# Patient Record
Sex: FEMALE | Race: WHITE | Employment: OTHER | URBAN - METROPOLITAN AREA
[De-identification: names, ages, dates, MRNs, and addresses within clinical notes are randomized per-mention and may not be internally consistent; named-entity substitution may affect disease eponyms.]

---

## 2021-03-17 ENCOUNTER — OFFICE VISIT (OUTPATIENT)
Dept: CARDIOLOGY CLINIC | Age: 66
End: 2021-03-17
Payer: MEDICARE

## 2021-03-17 VITALS
SYSTOLIC BLOOD PRESSURE: 124 MMHG | BODY MASS INDEX: 47.09 KG/M2 | DIASTOLIC BLOOD PRESSURE: 82 MMHG | WEIGHT: 293 LBS | HEIGHT: 66 IN | RESPIRATION RATE: 22 BRPM | OXYGEN SATURATION: 99 % | HEART RATE: 67 BPM

## 2021-03-17 DIAGNOSIS — I50.9 CONGESTIVE HEART FAILURE, UNSPECIFIED HF CHRONICITY, UNSPECIFIED HEART FAILURE TYPE (HCC): ICD-10-CM

## 2021-03-17 DIAGNOSIS — Z76.89 ENCOUNTER TO ESTABLISH CARE: ICD-10-CM

## 2021-03-17 DIAGNOSIS — R06.02 SHORTNESS OF BREATH: Primary | ICD-10-CM

## 2021-03-17 PROCEDURE — 93000 ELECTROCARDIOGRAM COMPLETE: CPT | Performed by: INTERNAL MEDICINE

## 2021-03-17 PROCEDURE — 99204 OFFICE O/P NEW MOD 45 MIN: CPT | Performed by: INTERNAL MEDICINE

## 2021-03-17 RX ORDER — PREDNISONE 10 MG/1
10 TABLET ORAL DAILY
Qty: 30 TABLET | Refills: 0 | Status: SHIPPED | OUTPATIENT
Start: 2021-03-17 | End: 2021-03-22

## 2021-03-17 ASSESSMENT — ENCOUNTER SYMPTOMS
SHORTNESS OF BREATH: 0
VOMITING: 0
NAUSEA: 0
APNEA: 0
BLOOD IN STOOL: 0
CHEST TIGHTNESS: 0
DIARRHEA: 0

## 2021-03-17 NOTE — PROGRESS NOTES
[address] [facility] OFFICE FOLLOW-UP      Patient: Mary Carlton  YOB: 1955  MRN: [id number]    Chief Complaint:  Chief Complaint   Patient presents with   Guadalupe Parker Cardiologist     Transfer for Dr. Zuri Preciado Congestive Heart Failure         Subjective/HPI:  3/17/21: Patient presents today for evaluation of shortness of breath. She sees Dr. Keyon Toledos. Reportedly was started on Medrol. Shortness of breath has not improved much. She is on a tapering dose. Currently on 20mg and then down to 10mg in a few days. Still morbidly obese. Accompanied by her daughter. She does not have a family physician. She is on a higher oxygen demand. Currently on 5 L. Has history of hypertension and also diabetes. Is on Metformin. Has dyslipidemia. She is accompanied by her daughter. Also has a history of GI issues. Will be set up with Dr. Becky Roth. Dr. Alyssa Justice and Dr. Zula Prader in the pulmonary service. She wants to consolidate all her specialties and primary care in [facility].  We will get an echocardiogram to evaluate electrical systolic pressure and right ventricular function and then see me. Prescription for 10 mg of prednisone to continue for another month will be made. Also has lower extremity edema which I think is due to venous insufficiency but will make therapeutic decision after echo is available             No past medical history on file. No past surgical history on file. No family history on file. Social History     Socioeconomic History    Marital status:       Spouse name: None    Number of children: None    Years of education: None    Highest education level: None   Occupational History    None   Social Needs    Financial resource strain: None    Food insecurity     Worry: None     Inability: None    Transportation needs     Medical: None     Non-medical: None   Tobacco Use    Smoking status: Former Smoker    Smokeless tobacco: Never Used   Substance and Sexual Activity    Alcohol use: Not Currently     Comment: occass    Drug use: Never    Sexual activity: None   Lifestyle    Physical activity     Days per week: None     Minutes per session: None    Stress: None   Relationships    Social connections     Talks on phone: None     Gets together: None     Attends Hoahaoism service: None     Active member of club or organization: None     Attends meetings of clubs or organizations: None     Relationship status: None    Intimate partner violence     Fear of current or ex partner: None     Emotionally abused: None     Physically abused: None     Forced sexual activity: None   Other Topics Concern    None   Social History Narrative    None       Allergies   Allergen Reactions    Budesonide-Formoterol Fumarate      Other reaction(s): THRUSH  Other reaction(s): Other: See Comments  thrush       Current Outpatient Medications   Medication Sig Dispense Refill    predniSONE (DELTASONE) 10 MG tablet Take 1 tablet by mouth daily for 5 days 30 tablet 0     No current facility-administered medications for this visit. Review of Systems:   Review of Systems   Constitutional: Negative for diaphoresis and fatigue. HENT: Negative for nosebleeds. Respiratory: Negative for apnea, cough, chest tightness, shortness of breath, wheezing and stridor. Cardiovascular: Negative for chest pain, palpitations and leg swelling. Gastrointestinal: Negative for blood in stool, diarrhea, nausea and vomiting. Musculoskeletal: Negative for myalgias, neck pain and neck stiffness. Neurological: Negative for dizziness, seizures, syncope, weakness, light-headedness, numbness and headaches. Hematological: Does not bruise/bleed easily. Psychiatric/Behavioral: Negative for confusion and suicidal ideas. The patient is not nervous/anxious. All other systems reviewed and are negative. Review of System is negative except for as mentioned above.       Physical Examination:    BP Provider:   Garrett Carpio MD     Requested Specialty:   Pulmonology     Number of Visits Requested:   1    EKG 12 Lead     Order Specific Question:   Reason for Exam?     Answer:   Shortness of Breath    Echo 2D w doppler w color complete     Standing Status:   Future     Standing Expiration Date:   3/17/2022     Scheduling Instructions: To be scheduled in [facility] Next week     Order Specific Question:   Reason for exam:     Answer:   chf         Orders Placed This Encounter   Medications    predniSONE (DELTASONE) 10 MG tablet     Sig: Take 1 tablet by mouth daily for 5 days     Dispense:  30 tablet     Refill:  0             Assessment/Orders:       ICD-10-CM    1. Shortness of breath  R06.02 EKG 12 Lead   2. Congestive heart failure, unspecified HF chronicity, unspecified heart failure type ([facility] [address]  I50.9 Echo 2D w doppler w color complete   3. Encounter to establish care  Z76.89 [facility] CANDY LARKIN MD, Gastroenterology, [facility]     Ambulatory referral to [address] [facility] Brian Weaver MD, Pulmonology, [place]       Orders Placed This Encounter   Medications    predniSONE (DELTASONE) 10 MG tablet     Sig: Take 1 tablet by mouth daily for 5 days     Dispense:  30 tablet     Refill:  0       There are no discontinued medications.     Orders Placed This Encounter   Procedures   [facility] CANDY LARKIN MD, Gastroenterology, [facility]     Referral Priority:   Routine     Referral Type:   Eval and Treat     Referral Reason:   Specialty Services Required     Referred to Provider:   Estefani Ferrera MD     Requested Specialty:   Gastroenterology     Number of Visits Requested:   1    Ambulatory referral to Internal Medicine     Referral Priority:   Routine     Referral Type:   Consult for Advice and Opinion     Referral Reason:   Specialty Services Required     Requested Specialty:   Internal Medicine     Number of Visits Requested:   Sivan Dong MD, Pulmonology, [facility]

## 2021-03-18 ASSESSMENT — ENCOUNTER SYMPTOMS
COUGH: 0
STRIDOR: 0
WHEEZING: 0

## 2021-03-19 ENCOUNTER — APPOINTMENT (OUTPATIENT)
Dept: CT IMAGING | Age: 66
DRG: 552 | End: 2021-03-19
Payer: MEDICARE

## 2021-03-19 ENCOUNTER — APPOINTMENT (OUTPATIENT)
Dept: ULTRASOUND IMAGING | Age: 66
DRG: 552 | End: 2021-03-19
Payer: MEDICARE

## 2021-03-19 ENCOUNTER — APPOINTMENT (OUTPATIENT)
Dept: GENERAL RADIOLOGY | Age: 66
DRG: 552 | End: 2021-03-19
Payer: MEDICARE

## 2021-03-19 ENCOUNTER — APPOINTMENT (OUTPATIENT)
Dept: MRI IMAGING | Age: 66
DRG: 552 | End: 2021-03-19
Payer: MEDICARE

## 2021-03-19 ENCOUNTER — HOSPITAL ENCOUNTER (INPATIENT)
Age: 66
LOS: 2 days | Discharge: HOME | DRG: 552 | End: 2021-03-21
Attending: EMERGENCY MEDICINE | Admitting: INTERNAL MEDICINE
Payer: MEDICARE

## 2021-03-19 DIAGNOSIS — R29.898 LEFT LEG WEAKNESS: ICD-10-CM

## 2021-03-19 DIAGNOSIS — M79.10 MYALGIA: ICD-10-CM

## 2021-03-19 DIAGNOSIS — I63.9 CEREBROVASCULAR ACCIDENT (CVA), UNSPECIFIED MECHANISM (HCC): Primary | ICD-10-CM

## 2021-03-19 DIAGNOSIS — M54.16 LUMBAR RADICULOPATHY: ICD-10-CM

## 2021-03-19 DIAGNOSIS — M54.42 ACUTE BACK PAIN WITH SCIATICA, LEFT: ICD-10-CM

## 2021-03-19 LAB
ALBUMIN SERPL-MCNC: 3.8 G/DL (ref 3.5–4.6)
ALP BLD-CCNC: 95 U/L (ref 40–130)
ALT SERPL-CCNC: 51 U/L (ref 0–33)
ANION GAP SERPL CALCULATED.3IONS-SCNC: 11 MEQ/L (ref 9–15)
APTT: 24.4 SEC (ref 24.4–36.8)
AST SERPL-CCNC: 51 U/L (ref 0–35)
BASOPHILS ABSOLUTE: 0.2 K/UL (ref 0–0.2)
BASOPHILS RELATIVE PERCENT: 1.1 %
BILIRUB SERPL-MCNC: 1 MG/DL (ref 0.2–0.7)
BUN BLDV-MCNC: 18 MG/DL (ref 8–23)
CALCIUM SERPL-MCNC: 9.7 MG/DL (ref 8.5–9.9)
CHLORIDE BLD-SCNC: 98 MEQ/L (ref 95–107)
CO2: 32 MEQ/L (ref 20–31)
CREAT SERPL-MCNC: 1.44 MG/DL (ref 0.5–0.9)
EKG ATRIAL RATE: 75 BPM
EKG P AXIS: 63 DEGREES
EKG P-R INTERVAL: 158 MS
EKG Q-T INTERVAL: 382 MS
EKG QRS DURATION: 82 MS
EKG QTC CALCULATION (BAZETT): 426 MS
EKG R AXIS: 32 DEGREES
EKG T AXIS: 61 DEGREES
EKG VENTRICULAR RATE: 75 BPM
EOSINOPHILS ABSOLUTE: 0.4 K/UL (ref 0–0.7)
EOSINOPHILS RELATIVE PERCENT: 2.7 %
GFR AFRICAN AMERICAN: 43.8
GFR NON-AFRICAN AMERICAN: 36.2
GLOBULIN: 3 G/DL (ref 2.3–3.5)
GLUCOSE BLD-MCNC: 157 MG/DL (ref 60–115)
GLUCOSE BLD-MCNC: 183 MG/DL (ref 70–99)
GLUCOSE BLD-MCNC: 246 MG/DL (ref 60–115)
HCT VFR BLD CALC: 41.5 % (ref 37–47)
HEMOGLOBIN: 14 G/DL (ref 12–16)
INR BLD: 0.9
LYMPHOCYTES ABSOLUTE: 2.5 K/UL (ref 1–4.8)
LYMPHOCYTES RELATIVE PERCENT: 17.8 %
MAGNESIUM: 1.8 MG/DL (ref 1.7–2.4)
MCH RBC QN AUTO: 30.7 PG (ref 27–31.3)
MCHC RBC AUTO-ENTMCNC: 33.7 % (ref 33–37)
MCV RBC AUTO: 91.3 FL (ref 82–100)
MONOCYTES ABSOLUTE: 0.6 K/UL (ref 0.2–0.8)
MONOCYTES RELATIVE PERCENT: 4.3 %
NEUTROPHILS ABSOLUTE: 10.4 K/UL (ref 1.4–6.5)
NEUTROPHILS RELATIVE PERCENT: 74.1 %
PDW BLD-RTO: 13.6 % (ref 11.5–14.5)
PERFORMED ON: ABNORMAL
PERFORMED ON: ABNORMAL
PLATELET # BLD: 412 K/UL (ref 130–400)
POTASSIUM SERPL-SCNC: 4 MEQ/L (ref 3.4–4.9)
PROTHROMBIN TIME: 12.7 SEC (ref 12.3–14.9)
RBC # BLD: 4.55 M/UL (ref 4.2–5.4)
SARS-COV-2, NAAT: NOT DETECTED
SODIUM BLD-SCNC: 141 MEQ/L (ref 135–144)
TOTAL PROTEIN: 6.8 G/DL (ref 6.3–8)
TROPONIN: <0.01 NG/ML (ref 0–0.01)
WBC # BLD: 14 K/UL (ref 4.8–10.8)

## 2021-03-19 PROCEDURE — 1210000000 HC MED SURG R&B

## 2021-03-19 PROCEDURE — 92610 EVALUATE SWALLOWING FUNCTION: CPT

## 2021-03-19 PROCEDURE — 6370000000 HC RX 637 (ALT 250 FOR IP): Performed by: EMERGENCY MEDICINE

## 2021-03-19 PROCEDURE — 85025 COMPLETE CBC W/AUTO DIFF WBC: CPT

## 2021-03-19 PROCEDURE — 6370000000 HC RX 637 (ALT 250 FOR IP): Performed by: INTERNAL MEDICINE

## 2021-03-19 PROCEDURE — 70553 MRI BRAIN STEM W/O & W/DYE: CPT

## 2021-03-19 PROCEDURE — 2580000003 HC RX 258: Performed by: INTERNAL MEDICINE

## 2021-03-19 PROCEDURE — 85730 THROMBOPLASTIN TIME PARTIAL: CPT

## 2021-03-19 PROCEDURE — 71045 X-RAY EXAM CHEST 1 VIEW: CPT

## 2021-03-19 PROCEDURE — 93010 ELECTROCARDIOGRAM REPORT: CPT | Performed by: INTERNAL MEDICINE

## 2021-03-19 PROCEDURE — 99284 EMERGENCY DEPT VISIT MOD MDM: CPT

## 2021-03-19 PROCEDURE — A9579 GAD-BASE MR CONTRAST NOS,1ML: HCPCS | Performed by: INTERNAL MEDICINE

## 2021-03-19 PROCEDURE — 6360000002 HC RX W HCPCS: Performed by: NURSE PRACTITIONER

## 2021-03-19 PROCEDURE — 84484 ASSAY OF TROPONIN QUANT: CPT

## 2021-03-19 PROCEDURE — 36415 COLL VENOUS BLD VENIPUNCTURE: CPT

## 2021-03-19 PROCEDURE — 93005 ELECTROCARDIOGRAM TRACING: CPT | Performed by: EMERGENCY MEDICINE

## 2021-03-19 PROCEDURE — 87635 SARS-COV-2 COVID-19 AMP PRB: CPT

## 2021-03-19 PROCEDURE — 97165 OT EVAL LOW COMPLEX 30 MIN: CPT

## 2021-03-19 PROCEDURE — 70450 CT HEAD/BRAIN W/O DYE: CPT

## 2021-03-19 PROCEDURE — 6360000004 HC RX CONTRAST MEDICATION: Performed by: INTERNAL MEDICINE

## 2021-03-19 PROCEDURE — 83735 ASSAY OF MAGNESIUM: CPT

## 2021-03-19 PROCEDURE — 2580000003 HC RX 258: Performed by: EMERGENCY MEDICINE

## 2021-03-19 PROCEDURE — 93970 EXTREMITY STUDY: CPT

## 2021-03-19 PROCEDURE — 6360000002 HC RX W HCPCS: Performed by: INTERNAL MEDICINE

## 2021-03-19 PROCEDURE — 94664 DEMO&/EVAL PT USE INHALER: CPT

## 2021-03-19 PROCEDURE — 80053 COMPREHEN METABOLIC PANEL: CPT

## 2021-03-19 PROCEDURE — 94761 N-INVAS EAR/PLS OXIMETRY MLT: CPT

## 2021-03-19 PROCEDURE — 85610 PROTHROMBIN TIME: CPT

## 2021-03-19 PROCEDURE — 94640 AIRWAY INHALATION TREATMENT: CPT

## 2021-03-19 RX ORDER — SODIUM CHLORIDE 0.9 % (FLUSH) 0.9 %
10 SYRINGE (ML) INJECTION 2 TIMES DAILY
Status: DISCONTINUED | OUTPATIENT
Start: 2021-03-19 | End: 2021-03-21 | Stop reason: HOSPADM

## 2021-03-19 RX ORDER — ASPIRIN 300 MG/1
300 SUPPOSITORY RECTAL DAILY
Status: DISCONTINUED | OUTPATIENT
Start: 2021-03-19 | End: 2021-03-21 | Stop reason: HOSPADM

## 2021-03-19 RX ORDER — ALBUTEROL SULFATE 2.5 MG/3ML
2.5 SOLUTION RESPIRATORY (INHALATION)
Status: DISCONTINUED | OUTPATIENT
Start: 2021-03-19 | End: 2021-03-21 | Stop reason: HOSPADM

## 2021-03-19 RX ORDER — MONTELUKAST SODIUM 10 MG/1
10 TABLET ORAL NIGHTLY
COMMUNITY
End: 2022-03-17 | Stop reason: SDUPTHER

## 2021-03-19 RX ORDER — ALBUTEROL SULFATE 90 UG/1
2 AEROSOL, METERED RESPIRATORY (INHALATION) EVERY 6 HOURS PRN
COMMUNITY

## 2021-03-19 RX ORDER — ALBUTEROL SULFATE 2.5 MG/3ML
2.5 SOLUTION RESPIRATORY (INHALATION) EVERY 6 HOURS PRN
COMMUNITY

## 2021-03-19 RX ORDER — ONDANSETRON 2 MG/ML
4 INJECTION INTRAMUSCULAR; INTRAVENOUS EVERY 6 HOURS PRN
Status: DISCONTINUED | OUTPATIENT
Start: 2021-03-19 | End: 2021-03-21 | Stop reason: HOSPADM

## 2021-03-19 RX ORDER — ASPIRIN 81 MG/1
81 TABLET ORAL DAILY
Status: DISCONTINUED | OUTPATIENT
Start: 2021-03-19 | End: 2021-03-21 | Stop reason: HOSPADM

## 2021-03-19 RX ORDER — PRAVASTATIN SODIUM 20 MG
20 TABLET ORAL NIGHTLY
COMMUNITY
End: 2021-10-21 | Stop reason: SDUPTHER

## 2021-03-19 RX ORDER — 0.9 % SODIUM CHLORIDE 0.9 %
1000 INTRAVENOUS SOLUTION INTRAVENOUS ONCE
Status: COMPLETED | OUTPATIENT
Start: 2021-03-19 | End: 2021-03-19

## 2021-03-19 RX ORDER — ALBUTEROL SULFATE 2.5 MG/3ML
2.5 SOLUTION RESPIRATORY (INHALATION) EVERY 6 HOURS PRN
Status: DISCONTINUED | OUTPATIENT
Start: 2021-03-19 | End: 2021-03-19

## 2021-03-19 RX ORDER — POLYETHYLENE GLYCOL 3350 17 G/17G
17 POWDER, FOR SOLUTION ORAL DAILY PRN
Status: DISCONTINUED | OUTPATIENT
Start: 2021-03-19 | End: 2021-03-21 | Stop reason: HOSPADM

## 2021-03-19 RX ORDER — BUDESONIDE AND FORMOTEROL FUMARATE DIHYDRATE 160; 4.5 UG/1; UG/1
2 AEROSOL RESPIRATORY (INHALATION) 2 TIMES DAILY
COMMUNITY
End: 2021-04-27

## 2021-03-19 RX ORDER — DEXTROSE MONOHYDRATE 50 MG/ML
100 INJECTION, SOLUTION INTRAVENOUS PRN
Status: DISCONTINUED | OUTPATIENT
Start: 2021-03-19 | End: 2021-03-21 | Stop reason: HOSPADM

## 2021-03-19 RX ORDER — DULOXETIN HYDROCHLORIDE 60 MG/1
60 CAPSULE, DELAYED RELEASE ORAL DAILY
COMMUNITY
End: 2021-05-28 | Stop reason: ALTCHOICE

## 2021-03-19 RX ORDER — SODIUM CHLORIDE 0.9 % (FLUSH) 0.9 %
10 SYRINGE (ML) INJECTION EVERY 12 HOURS SCHEDULED
Status: DISCONTINUED | OUTPATIENT
Start: 2021-03-19 | End: 2021-03-21 | Stop reason: HOSPADM

## 2021-03-19 RX ORDER — CLOPIDOGREL BISULFATE 75 MG/1
150 TABLET ORAL ONCE
Status: COMPLETED | OUTPATIENT
Start: 2021-03-19 | End: 2021-03-19

## 2021-03-19 RX ORDER — ACLIDINIUM BROMIDE 400 UG/1
1 POWDER, METERED RESPIRATORY (INHALATION) 2 TIMES DAILY
COMMUNITY
End: 2021-04-27 | Stop reason: SDUPTHER

## 2021-03-19 RX ORDER — NICOTINE POLACRILEX 4 MG
15 LOZENGE BUCCAL PRN
Status: DISCONTINUED | OUTPATIENT
Start: 2021-03-19 | End: 2021-03-21 | Stop reason: HOSPADM

## 2021-03-19 RX ORDER — POTASSIUM CHLORIDE 1.5 G/1.77G
20 POWDER, FOR SOLUTION ORAL 2 TIMES DAILY
COMMUNITY
End: 2021-05-28 | Stop reason: ALTCHOICE

## 2021-03-19 RX ORDER — DEXTROSE MONOHYDRATE 25 G/50ML
12.5 INJECTION, SOLUTION INTRAVENOUS PRN
Status: DISCONTINUED | OUTPATIENT
Start: 2021-03-19 | End: 2021-03-21 | Stop reason: HOSPADM

## 2021-03-19 RX ORDER — ASPIRIN 81 MG/1
81 TABLET ORAL DAILY
COMMUNITY
End: 2022-06-09

## 2021-03-19 RX ORDER — ATORVASTATIN CALCIUM 80 MG/1
80 TABLET, FILM COATED ORAL NIGHTLY
Status: DISCONTINUED | OUTPATIENT
Start: 2021-03-19 | End: 2021-03-21 | Stop reason: HOSPADM

## 2021-03-19 RX ORDER — ALBUTEROL SULFATE 2.5 MG/3ML
2.5 SOLUTION RESPIRATORY (INHALATION) 2 TIMES DAILY
Status: DISCONTINUED | OUTPATIENT
Start: 2021-03-20 | End: 2021-03-21 | Stop reason: HOSPADM

## 2021-03-19 RX ORDER — TORSEMIDE 20 MG/1
20 TABLET ORAL DAILY
COMMUNITY
End: 2022-03-17 | Stop reason: SDUPTHER

## 2021-03-19 RX ORDER — PROMETHAZINE HYDROCHLORIDE 12.5 MG/1
12.5 TABLET ORAL EVERY 6 HOURS PRN
Status: DISCONTINUED | OUTPATIENT
Start: 2021-03-19 | End: 2021-03-21 | Stop reason: HOSPADM

## 2021-03-19 RX ORDER — SODIUM CHLORIDE 0.9 % (FLUSH) 0.9 %
10 SYRINGE (ML) INJECTION PRN
Status: DISCONTINUED | OUTPATIENT
Start: 2021-03-19 | End: 2021-03-21 | Stop reason: HOSPADM

## 2021-03-19 RX ORDER — LOSARTAN POTASSIUM 25 MG/1
25 TABLET ORAL EVERY EVENING
COMMUNITY
End: 2021-03-30 | Stop reason: ALTCHOICE

## 2021-03-19 RX ADMIN — ATORVASTATIN CALCIUM 80 MG: 80 TABLET, FILM COATED ORAL at 21:57

## 2021-03-19 RX ADMIN — ENOXAPARIN SODIUM 40 MG: 40 INJECTION SUBCUTANEOUS at 15:08

## 2021-03-19 RX ADMIN — SODIUM CHLORIDE 1000 ML: 9 INJECTION, SOLUTION INTRAVENOUS at 11:38

## 2021-03-19 RX ADMIN — ALBUTEROL SULFATE 2.5 MG: 2.5 SOLUTION RESPIRATORY (INHALATION) at 22:18

## 2021-03-19 RX ADMIN — GADOTERIDOL 20 ML: 279.3 INJECTION, SOLUTION INTRAVENOUS at 14:19

## 2021-03-19 RX ADMIN — CLOPIDOGREL BISULFATE 150 MG: 75 TABLET ORAL at 15:08

## 2021-03-19 RX ADMIN — SODIUM CHLORIDE, PRESERVATIVE FREE 10 ML: 5 INJECTION INTRAVENOUS at 15:09

## 2021-03-19 ASSESSMENT — PAIN SCALES - GENERAL
PAINLEVEL_OUTOF10: 8
PAINLEVEL_OUTOF10: 0

## 2021-03-19 ASSESSMENT — ENCOUNTER SYMPTOMS
BACK PAIN: 0
NAUSEA: 0
SHORTNESS OF BREATH: 0
ABDOMINAL PAIN: 0
DIARRHEA: 0
COUGH: 0
SORE THROAT: 0
VOMITING: 0

## 2021-03-19 ASSESSMENT — PAIN DESCRIPTION - DESCRIPTORS: DESCRIPTORS: NUMBNESS

## 2021-03-19 NOTE — FLOWSHEET NOTE
1522: pt arrived from ED via cart. Pt states that last night she started having weakness and tingling in her left leg. Then this morning her left arm started to tingle and became numb. No other symptoms reported and pt's symptoms have resolved. NIH is currently 1. Ptis A&OX4. 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 Push/pulls/ are moderate-strong. No tongue deviation or facial droop. No drift noted. No headache. No aphasia. Pt passed swallow eval. Pt wears a c-pap at night. Perfect serve sent to the NP for orders.

## 2021-03-19 NOTE — PROGRESS NOTES
[facility]   Facility/Department: [facility]  Speech Language Pathology  Clinical Bedside Swallow Evaluation    Hellen Hodges  : 1955 (75 y.o.)   MRN: [id number]  [facility]  ADMISSION DATE: 3/19/2021  PATIENT DIAGNOSIS(ES): Left leg weakness [R29.898]  Chief Complaint   Patient presents with    Numbness     numbness to left leg and left arm starting 3 hours ago. Patient Active Problem List    Diagnosis Date Noted    Left leg weakness 2021     Past Medical History:   Diagnosis Date    Asthma     COPD (chronic obstructive pulmonary disease) ([facility] [address]     Depression     Diabetes mellitus ([facility] [address]      Past Surgical History:   Procedure Laterality Date    CHOLECYSTECTOMY       Allergies   Allergen Reactions    Budesonide-Formoterol Fumarate      Other reaction(s): THRUSH  Other reaction(s): Other: See Comments  thrush       DATE ONSET: 3/19/21    Date of Evaluation: 3/19/2021   Evaluating Therapist: Sunil Maria SLP    Recommended Diet and Intervention  Diet Solids Recommendation: Regular  Liquid Consistency Recommendation: Thin  Recommended Form of Meds: Whole with water          Compensatory Swallowing Strategies       Reason for Referral  Marcy Magana was referred for a bedside swallow evaluation to assess the efficiency of her swallow function, identify signs and symptoms of aspiration and make recommendations regarding safe dietary consistencies, effective compensatory strategies, and safe eating environment. General  Chart Reviewed: Yes  Behavior/Cognition: Alert; Cooperative  Temperature Spikes Noted: No  Respiratory Status: Room air  O2 Device: None (Room air)  Communication Observation: Functional  Follows Directions: Simple  Dentition: Some missing teeth  Patient Positioning: Upright in bed  Baseline Vocal Quality: Normal  Consistencies Administered: Reg solid; Dysphagia Soft and Bite-Sized (Dysphagia III); Dysphagia Pureed (Dysphagia I); Thin    Current Diet level:  Current Diet : Regular  Current Liquid Diet : Thin    Oral Motor Deficits  Oral/Motor  Oral Motor: Within functional limits    Oral Phase Dysfunction  Oral Phase  Oral Phase: WNL  Oral Phase  Oral Phase - Comment: Pt p/w oral phase WNL for all consistencies tested     Indicators of Pharyngeal Phase Dysfunction   Pharyngeal Phase  Pharyngeal Phase: WNL  Pharyngeal Phase   Pharyngeal: Pt p/w pharyngeal phase WNL for all consistencies judged vial palpation with no overt s/s of aspiration    Impression  Dysphagia Diagnosis: Swallow function appears grossly intact  Dysphagia Outcome Severity Scale: Level 7: Normal in all situations     Treatment Plan     Duration/Frequency of Treatment: N/A  D/C Recommendations: To be determined       Treatment/Goals  Short-term Goals  Goal 1: N/A  Long-term Goals  Goal 1: N/A    Prognosis       Education  Patient Education: pt educated on results of BSE  Patient Education Response: Verbalizes understanding          Pain Assessment:  Initial Assessment:  Patient denies pain. Re-assessment:  Patient denies pain.     [x]  Perfect Serve sent to ordering physician  [x]  Evaluation routed to ordering physician inbox         Therapy Time   Time in: 4569  Time out: [facility]  Minutes: 8              Signature: Electronically signed by BRITANY Johns on 3/19/2021 at 3:56 PM

## 2021-03-19 NOTE — CARE COORDINATION
[facility] EMERGENCY [facility] AT [facility] Case Management Initial Discharge Assessment    Met with DAUGHTER Tequila Morales to discuss discharge plan. PCP:  BECCA WATERS  JUST GOT SET UP                      Date of Last Visit: N/A    If no PCP, list provided? N/A    Discharge Planning    Living Arrangements: independently at home    Who do you live with? SELF    Who helps you with your care:  self or Kirsty [id number]    If lives at home:     Do you have any barriers navigating in your home? yes - STAIRS TO ENTRANCE AND TO BATHROOM BUT OTHERWISE IT IS ONE FLOOR    Patient can perform ADL? Yes    Current Services (outpatient and in home) :  None    Dialysis: No    Is transportation available to get to your appointments? Yes    DME Equipment:  NO    Respiratory equipment: Continuous Oxygen  5 Liters , CPAP WITH 5 LITER O2    Respiratory provider:  yes - [address]:  yes - [address] with Medication Assistance Program?  No      Patient agreeable to [facility] ? Yes, Company NEEDS LIST    Patient agreeable to SNF/Rehab? YES    Other discharge needs identified? Other TBD    [place] of choice list provided with basic dialogue that supports the patient's individualized plan of care/goals and shares the quality data associated with the providers. Yes    Does Patient Have a High-Risk for Readmission Diagnosis (CHF, PN, MI, COPD)? Yes, see care coordinator assessment COPD      The plan for Transition of Care is related to the following treatment goals:     Initial Discharge Plan? (Note: please see concurrent daily documentation for any updates after initial note). [id number]    The Patient and/or patient representative: WILL BE provided with choice of any post-acute providers for care and equipment and agrees with discharge plan  Yes    Electronically signed by Catherine Barton RN on 3/19/2021 at 1:07 PM

## 2021-03-19 NOTE — ED PROVIDER NOTES
[facility] ED  eMERGENCYdEPARTMENT eNCOUnter      Pt Name: Harjeet Roy  MRN: [id number]  Katiegfmarcela 1955  Date of evaluation: 3/19/2021  Christa Case MD    CHIEF COMPLAINT           HPI  Harjeet Roy is a 74 y.o. female per chart review has a h/o DM II, HTN, Hpl, COPD/asthma presents to the ED with weakness and numbness. Pt notes gradual onset, moderate, constant, weakness and numbness of L leg since midnight. Pt also notes numbness of LUE x 3 hours. Pt denies fever, headache, dizziness, cp, sob, dysuria, diarrhea. Pt has never had a CVA before. ROS  Review of Systems   Constitutional: Negative for activity change, chills and fever. HENT: Negative for ear pain and sore throat. Eyes: Negative for visual disturbance. Respiratory: Negative for cough and shortness of breath. Cardiovascular: Negative for chest pain, palpitations and leg swelling. Gastrointestinal: Negative for abdominal pain, diarrhea, nausea and vomiting. Genitourinary: Negative for dysuria. Musculoskeletal: Negative for back pain. Skin: Negative for rash. Neurological: Positive for weakness and numbness. Negative for dizziness. Except as noted above the remainder of the review of systems was reviewed and negative. PAST MEDICAL HISTORY     Past Medical History:   Diagnosis Date    Asthma     COPD (chronic obstructive pulmonary disease) ([facility] [address]     Depression     Diabetes mellitus ([facility] [address]          SURGICAL HISTORY       Past Surgical History:   Procedure Laterality Date    CHOLECYSTECTOMY           CURRENTMEDICATIONS       Previous Medications    PREDNISONE (DELTASONE) 10 MG TABLET    Take 1 tablet by mouth daily for 5 days       ALLERGIES     Budesonide-formoterol fumarate    FAMILY HISTORY     History reviewed. No pertinent family history. SOCIAL HISTORY       Social History     Socioeconomic History    Marital status:       Spouse name: None    Number of children: None    Years of education: None    Highest education level: None   Occupational History    None   Social Needs    Financial resource strain: None    Food insecurity     Worry: None     Inability: None    Transportation needs     Medical: None     Non-medical: None   Tobacco Use    Smoking status: Former Smoker    Smokeless tobacco: Never Used   Substance and Sexual Activity    Alcohol use: Not Currently     Comment: occass    Drug use: Never    Sexual activity: None   Lifestyle    Physical activity     Days per week: None     Minutes per session: None    Stress: None   Relationships    Social connections     Talks on phone: None     Gets together: None     Attends Sikh service: None     Active member of club or organization: None     Attends meetings of clubs or organizations: None     Relationship status: None    Intimate partner violence     Fear of current or ex partner: None     Emotionally abused: None     Physically abused: None     Forced sexual activity: None   Other Topics Concern    None   Social History Narrative    None         PHYSICAL EXAM       ED Triage Vitals   BP Temp Temp src Pulse Resp SpO2 Height Weight   -- -- -- -- -- -- -- --       Physical Exam  Vitals signs and nursing note reviewed. Constitutional:       Appearance: She is well-developed. HENT:      Head: Normocephalic. Right Ear: External ear normal.      Left Ear: External ear normal.   Eyes:      Conjunctiva/sclera: Conjunctivae normal.      Pupils: Pupils are equal, round, and reactive to light. Neck:      Musculoskeletal: Normal range of motion and neck supple. Cardiovascular:      Rate and Rhythm: Normal rate and regular rhythm. Heart sounds: Normal heart sounds. Pulmonary:      Effort: Pulmonary effort is normal.      Breath sounds: Normal breath sounds. Abdominal:      General: Bowel sounds are normal. There is no distension. Palpations: Abdomen is soft. Tenderness:  There is no abdominal tenderness. Musculoskeletal: Normal range of motion. Skin:     General: Skin is warm and dry. Neurological:      Mental Status: She is alert. Comments: NIHSS 2   Psychiatric:         Mood and Affect: Mood normal.           MDM  75 yo female presents to the ED with L leg weakness, numbness and numbness of LUE. Pt is afebrile, hemodynamically stable. Pt not considered for tPA as pt's symptoms started 11 hours ago. EKG shows NSR with HR 75, normal axis, normal intervals, no ST changes. Labs remarkable for glucose 183, WBC 14, Cr 1.44. CT head negative. CXR negative. Pt clinically with CVA. Case discussed with Dr. Jarred Briggs (neurology) who recommended PO plavix and admission. Pt already takes asa. Pt given PO plavix in the ED. Pt's daughter requesting ultrasounds of legs to eval for DVT. US pending. Case then discussed with Dr. Mary Fall and pt admitted to medicine in stable condition. FINAL IMPRESSION      1.  Cerebrovascular accident (CVA), unspecified mechanism [facility])          DISPOSITION/PLAN   DISPOSITION Decision To Admit 03/19/2021 12:35:31 PM        DISCHARGE MEDICATIONS:  [unfilled]         Emeka Ackerman MD(electronically signed)  Attending Emergency Physician            Emeka Ackerman MD  03/19/21 [id number]

## 2021-03-19 NOTE — ED NOTES
Patient went from us to [facility] and will not be going to floor from mri.  Report called      Katlin Eastman RN  03/19/21 [id number]

## 2021-03-19 NOTE — H&P
[place] [id number] MEDICINE    HISTORY AND PHYSICAL EXAM    PATIENT NAME:  Beck Camargo    MRN:  [id number]  SERVICE DATE:  3/19/2021   SERVICE TIME:  12:50 PM    Primary Care Physician: Maame Beach MD     SUBJECTIVE  CHIEF COMPLAINT:  weakness    HPI:  Beck Camargo is a 74 y.o., , female who  has a past medical history of Asthma, COPD (chronic obstructive pulmonary disease) ([facility] Depression, and Diabetes mellitus ([facility] HTN, that is hospitalized for stroke like symptoms. Extremity Weakness  This is a new problem. The current episode started yesterday. Associated symptoms include numbness and weakness. Reports that yesterday she had left-sided weakness and difficulty walking started approximately midnight last night. Reports she had numbness that felt deep however no tingling. States today she started having numbness to her left elbow and shoulder for approximately 3 hours prior to arrival.  She denies headache dizziness, bowel and bladder incontinence, chest pain, fevers. 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 Upon exam patient denies any numbness or tingling reports her symptoms have subsided. PAST MEDICAL HISTORY:    Past Medical History:   Diagnosis Date    Asthma     COPD [facility]chronic obstructive pulmonary disease) ([facility]     Depression     Diabetes mellitus ([facility]      PAST SURGICAL HISTORY:    Past Surgical History:   Procedure Laterality Date    CHOLECYSTECTOMY       FAMILY HISTORY:  History reviewed. No pertinent family history. SOCIAL HISTORY:    Social History     Socioeconomic History    Marital status:       Spouse name: Not on file    Number of children: Not on file    Years of education: Not on file    Highest education level: Not on file   Occupational History    Not on file   Social Needs    Financial resource strain: Not on file    Food insecurity     Worry: Not on file     Inability: Not on file   [facility] Transportation needs     Medical: Not on file     Non-medical: Not on file   Tobacco Use    Smoking status: Former Smoker    Smokeless tobacco: Never Used   Substance and Sexual Activity    Alcohol use: Not Currently     Comment: occass    Drug use: Never    Sexual activity: Not on file   Lifestyle    Physical activity     Days per week: Not on file     Minutes per session: Not on file    Stress: Not on file   Relationships    Social connections     Talks on phone: Not on file     Gets together: Not on file     Attends Sikhism service: Not on file     Active member of club or organization: Not on file     Attends meetings of clubs or organizations: Not on file     Relationship status: Not on file    Intimate partner violence     Fear of current or ex partner: Not on file     Emotionally abused: Not on file     Physically abused: Not on file     Forced sexual activity: Not on file   Other Topics Concern    Not on file   Social History Narrative    Not on file     MEDICATIONS:   Prior to Admission medications    Medication Sig Start Date End Date Taking? Authorizing Provider   predniSONE (DELTASONE) 10 MG tablet Take 1 tablet by mouth daily for 5 days 3/17/21 3/22/21  Ciro Mcintosh MD       ALLERGIES: Budesonide-formoterol fumarate    REVIEW OF SYSTEM:   Review of Systems   Neurological: Positive for weakness and numbness. All other systems reviewed and are negative. OBJECTIVE  PHYSICAL EXAM:   Physical Exam  Constitutional:       General: She is not in acute distress. Appearance: She is obese. She is not ill-appearing or toxic-appearing. HENT:      Head: Normocephalic and atraumatic. Right Ear: External ear normal.      Left Ear: External ear normal.      Mouth/Throat:      Mouth: Mucous membranes are dry. Pharynx: Oropharynx is clear. Eyes:      Conjunctiva/sclera: Conjunctivae normal.      Comments: R eye unable to track   Neck:      Musculoskeletal: Normal range of motion. 4.6 g/dL    Total Bilirubin 1.0 (H) 0.2 - 0.7 mg/dL    Alkaline Phosphatase 95 40 - 130 U/L    ALT 51 (H) 0 - 33 U/L    AST 51 (H) 0 - 35 U/L    Globulin 3.0 2.3 - 3.5 g/dL   CBC Auto Differential    Collection Time: 03/19/21 11:15 AM   Result Value Ref Range    WBC 14.0 (H) 4.8 - 10.8 K/uL    RBC 4.55 4.20 - 5.40 M/uL    Hemoglobin 14.0 12.0 - 16.0 g/dL    Hematocrit 41.5 37.0 - 47.0 %    MCV 91.3 82.0 - 100.0 fL    MCH 30.7 27.0 - 31.3 pg    MCHC 33.7 33.0 - 37.0 %    RDW 13.6 11.5 - 14.5 %    Platelets 414 (H) 866 - 400 K/uL    Neutrophils % 74.1 %    Lymphocytes % 17.8 %    Monocytes % 4.3 %    Eosinophils % 2.7 %    Basophils % 1.1 %    Neutrophils Absolute 10.4 (H) 1.4 - 6.5 K/uL    Lymphocytes Absolute 2.5 1.0 - 4.8 K/uL    Monocytes Absolute 0.6 0.2 - 0.8 K/uL    Eosinophils Absolute 0.4 0.0 - 0.7 K/uL    Basophils Absolute 0.2 0.0 - 0.2 K/uL   Magnesium    Collection Time: 03/19/21 11:15 AM   Result Value Ref Range    Magnesium 1.8 1.7 - 2.4 mg/dL   Troponin    Collection Time: 03/19/21 11:15 AM   Result Value Ref Range    Troponin <0.010 0.000 - 0.010 ng/mL   APTT    Collection Time: 03/19/21 11:15 AM   Result Value Ref Range    aPTT 24.4 24.4 - 36.8 sec   Protime-INR    Collection Time: 03/19/21 11:15 AM   Result Value Ref Range    Protime 12.7 12.3 - 14.9 sec    INR 0.9    EKG 12 Lead - Chest Pain    Collection Time: 03/19/21 11:34 AM   Result Value Ref Range    Ventricular Rate 75 BPM    Atrial Rate 75 BPM    P-R Interval 158 ms    QRS Duration 82 ms    Q-T Interval 382 ms    QTc Calculation (Bazett) 426 ms    P Axis 63 degrees    R Axis 32 degrees    T Axis 61 degrees       IMAGING:  Ct Head Wo Contrast    Result Date: 3/19/2021  CT HEAD WO CONTRAST CLINICAL HISTORY: Numbness left leg, left arm. COMPARISON: None TECHNIQUE: Multiple unenhanced serial axial images of the brain from the vertex of the skull to the base of the skull were performed. FINDINGS: The ventricles are dilated.   This is compensatory to the surrounding moderate generalized parenchymal volume loss. No mass. No midline shift. The cisterns are patent. There are white matter and periventricular changes most likely consistent with chronic small vessel disease. No acute intra-axial or extra-axial findings. The visualized osseous structures are unremarkable. The visualized portion of the paranasal sinuses, and mastoids are unremarkable. Both globes are intact. No gross preseptal or post septal findings. NO ACUTE INTRA-AXIAL OR EXTRA-AXIAL FINDINGS. IF SIGNS OR SYMPTOMS PERSIST THEN CONSIDER  MRI TO FURTHER EVALUATE IF THERE ARE NO CONTRAINDICATIONS All CT scans at this facility use dose modulation, iterative reconstruction, and/or weight based dosing when appropriate to reduce radiation dose to as low as reasonably achievable. VTE Prophylaxis: lovenox    ASSESSMENT AND PLAN     Suspected CVA:  CVA pathway, ECHO, Telemetry, ASA, Statin, Neuro on board. PT OT to evaluate for deficits and to assess and make recommendations. Monitor BP closely. Neuro checks Q4hrs. Fall precautions. Bedside swallow eval prior to starting PO diet. MRI completed    DM type II: POCT ACHS, SSI, hypoglycemia protocol, Hold metformin for now    COPD, Stable. Duonebs PRN. Incentive Spirometry, Respiratory therapist assessment. Resume home meds. Continue home oxygen. Patient reports she takes Symbicort and albuterol nebulizers at home. However she is out of her Symbicort as her insurance would not pay for it has not seen pulmonary in an unknown amount of time. FROYLAN: Home CPAP    Hypertension:  We will continue home meds once verified    Plan of care discussed with: patient    SIGNATURE: DONNIE Baron NP  DATE: March 19, 2021  TIME: 12:50 PM   No admitting provider for patient encounter.  - supervising physician

## 2021-03-20 ENCOUNTER — APPOINTMENT (OUTPATIENT)
Dept: MRI IMAGING | Age: 66
DRG: 552 | End: 2021-03-20
Payer: MEDICARE

## 2021-03-20 LAB
CHOLESTEROL, TOTAL: 158 MG/DL (ref 0–199)
GLUCOSE BLD-MCNC: 132 MG/DL (ref 60–115)
GLUCOSE BLD-MCNC: 143 MG/DL (ref 60–115)
GLUCOSE BLD-MCNC: 179 MG/DL (ref 60–115)
GLUCOSE BLD-MCNC: 264 MG/DL (ref 60–115)
HBA1C MFR BLD: 7.5 % (ref 4.8–5.9)
HCT VFR BLD CALC: 37.7 % (ref 37–47)
HDLC SERPL-MCNC: 51 MG/DL (ref 40–59)
HEMOGLOBIN: 12.5 G/DL (ref 12–16)
LDL CHOLESTEROL CALCULATED: 87 MG/DL (ref 0–129)
MCH RBC QN AUTO: 30.4 PG (ref 27–31.3)
MCHC RBC AUTO-ENTMCNC: 33.1 % (ref 33–37)
MCV RBC AUTO: 91.7 FL (ref 82–100)
PDW BLD-RTO: 13.4 % (ref 11.5–14.5)
PERFORMED ON: ABNORMAL
PLATELET # BLD: 349 K/UL (ref 130–400)
RBC # BLD: 4.11 M/UL (ref 4.2–5.4)
TRIGL SERPL-MCNC: 101 MG/DL (ref 0–150)
WBC # BLD: 12 K/UL (ref 4.8–10.8)

## 2021-03-20 PROCEDURE — 94640 AIRWAY INHALATION TREATMENT: CPT

## 2021-03-20 PROCEDURE — 97162 PT EVAL MOD COMPLEX 30 MIN: CPT

## 2021-03-20 PROCEDURE — 2700000000 HC OXYGEN THERAPY PER DAY

## 2021-03-20 PROCEDURE — 2580000003 HC RX 258: Performed by: INTERNAL MEDICINE

## 2021-03-20 PROCEDURE — 83036 HEMOGLOBIN GLYCOSYLATED A1C: CPT

## 2021-03-20 PROCEDURE — 72141 MRI NECK SPINE W/O DYE: CPT

## 2021-03-20 PROCEDURE — 36415 COLL VENOUS BLD VENIPUNCTURE: CPT

## 2021-03-20 PROCEDURE — 85027 COMPLETE CBC AUTOMATED: CPT

## 2021-03-20 PROCEDURE — 6360000002 HC RX W HCPCS: Performed by: INTERNAL MEDICINE

## 2021-03-20 PROCEDURE — 72148 MRI LUMBAR SPINE W/O DYE: CPT

## 2021-03-20 PROCEDURE — 1210000000 HC MED SURG R&B

## 2021-03-20 PROCEDURE — 80061 LIPID PANEL: CPT

## 2021-03-20 PROCEDURE — 92523 SPEECH SOUND LANG COMPREHEN: CPT

## 2021-03-20 PROCEDURE — 6370000000 HC RX 637 (ALT 250 FOR IP): Performed by: NURSE PRACTITIONER

## 2021-03-20 PROCEDURE — 6370000000 HC RX 637 (ALT 250 FOR IP): Performed by: INTERNAL MEDICINE

## 2021-03-20 PROCEDURE — 99222 1ST HOSP IP/OBS MODERATE 55: CPT | Performed by: INTERNAL MEDICINE

## 2021-03-20 PROCEDURE — 94761 N-INVAS EAR/PLS OXIMETRY MLT: CPT

## 2021-03-20 RX ORDER — LORAZEPAM 2 MG/ML
1 INJECTION INTRAMUSCULAR ONCE
Status: COMPLETED | OUTPATIENT
Start: 2021-03-20 | End: 2021-03-20

## 2021-03-20 RX ORDER — ACETAMINOPHEN 325 MG/1
650 TABLET ORAL EVERY 4 HOURS PRN
Status: DISCONTINUED | OUTPATIENT
Start: 2021-03-20 | End: 2021-03-21 | Stop reason: HOSPADM

## 2021-03-20 RX ORDER — PREDNISONE 10 MG/1
10 TABLET ORAL DAILY
Status: DISCONTINUED | OUTPATIENT
Start: 2021-03-20 | End: 2021-03-21 | Stop reason: HOSPADM

## 2021-03-20 RX ADMIN — SODIUM CHLORIDE, PRESERVATIVE FREE 10 ML: 5 INJECTION INTRAVENOUS at 09:12

## 2021-03-20 RX ADMIN — INSULIN LISPRO 2 UNITS: 100 INJECTION, SOLUTION INTRAVENOUS; SUBCUTANEOUS at 10:35

## 2021-03-20 RX ADMIN — ALBUTEROL SULFATE 2.5 MG: 2.5 SOLUTION RESPIRATORY (INHALATION) at 07:51

## 2021-03-20 RX ADMIN — ACETAMINOPHEN 650 MG: 325 TABLET ORAL at 17:28

## 2021-03-20 RX ADMIN — LORAZEPAM 1 MG: 2 INJECTION INTRAMUSCULAR; INTRAVENOUS at 13:00

## 2021-03-20 RX ADMIN — METFORMIN HYDROCHLORIDE 500 MG: 500 TABLET ORAL at 17:29

## 2021-03-20 RX ADMIN — ACETAMINOPHEN 650 MG: 325 TABLET ORAL at 21:36

## 2021-03-20 RX ADMIN — INSULIN LISPRO 2 UNITS: 100 INJECTION, SOLUTION INTRAVENOUS; SUBCUTANEOUS at 17:38

## 2021-03-20 RX ADMIN — ALBUTEROL SULFATE 2.5 MG: 2.5 SOLUTION RESPIRATORY (INHALATION) at 15:36

## 2021-03-20 RX ADMIN — PREDNISONE 10 MG: 10 TABLET ORAL at 15:53

## 2021-03-20 RX ADMIN — ALBUTEROL SULFATE 2.5 MG: 2.5 SOLUTION RESPIRATORY (INHALATION) at 19:51

## 2021-03-20 RX ADMIN — ASPIRIN 81 MG: 81 TABLET, COATED ORAL at 09:12

## 2021-03-20 RX ADMIN — ENOXAPARIN SODIUM 40 MG: 40 INJECTION SUBCUTANEOUS at 09:12

## 2021-03-20 RX ADMIN — ATORVASTATIN CALCIUM 80 MG: 80 TABLET, FILM COATED ORAL at 21:37

## 2021-03-20 RX ADMIN — SODIUM CHLORIDE, PRESERVATIVE FREE 10 ML: 5 INJECTION INTRAVENOUS at 10:37

## 2021-03-20 RX ADMIN — SODIUM CHLORIDE, PRESERVATIVE FREE 10 ML: 5 INJECTION INTRAVENOUS at 21:37

## 2021-03-20 RX ADMIN — ACETAMINOPHEN 650 MG: 325 TABLET ORAL at 01:19

## 2021-03-20 ASSESSMENT — PAIN DESCRIPTION - LOCATION: LOCATION: BUTTOCKS;LEG

## 2021-03-20 ASSESSMENT — PAIN SCALES - GENERAL: PAINLEVEL_OUTOF10: 7

## 2021-03-20 NOTE — PLAN OF CARE
Problem: Falls - Risk of:  Goal: Will remain free from falls  Description: Will remain free from falls  Outcome: Ongoing  Goal: Absence of physical injury  Description: Absence of physical injury  Outcome: Ongoing     Problem: HEMODYNAMIC STATUS  Goal: Patient has stable vital signs and fluid balance  Outcome: Ongoing     Problem: ACTIVITY INTOLERANCE/IMPAIRED MOBILITY  Goal: Mobility/activity is maintained at optimum level for patient  Outcome: Ongoing     Problem: COMMUNICATION IMPAIRMENT  Goal: Ability to express needs and understand communication  Outcome: Ongoing     Problem: IP SWALLOWING  Goal: LTG - patient will tolerate the least restrictive diet consistency to allow for safe consumption of daily meals  Outcome: Ongoing     Problem: IP BALANCE  Goal: LTG - Patient will maintain balance to allow for safe/functional mobility  Outcome: Ongoing Burow's Advancement Flap Text: The defect edges were debeveled with a #15 scalpel blade.  Given the location of the defect and the proximity to free margins a Burow's advancement flap was deemed most appropriate.  Using a sterile surgical marker, the appropriate advancement flap was drawn incorporating the defect and placing the expected incisions within the relaxed skin tension lines where possible.    The area thus outlined was incised deep to adipose tissue with a #15 scalpel blade.  The skin margins were undermined to an appropriate distance in all directions utilizing iris scissors.

## 2021-03-20 NOTE — CONSULTS
Pulmonary and Critical Care Medicine  Consult Note  Encounter Date: 3/20/2021 11:40 AM    Ms. Radha Hunter is a 74 y.o. female  : 1955  Requesting Provider: DONNIE Bustamante    Reason for request: COPD, out patient recommendations          HISTORY OF PRESENT ILLNESS:    Patient is 74 y.o. presents to the emergency department with complaints of left leg weakness and numbness. She was evaluated in the emergency department and subsequently admitted to the hospitalist service. She does have a known history of COPD and sleep apnea. She does use inhaled medications at home. She currently is on a prednisone taper for COPD exacerbation. This was given to her by her pulmonologist.  She states she is in the process of trying to find a new pulmonologist.  She does have a scheduled appointment with Dr. Kimberli Vidal in the beginning of April. She previously followed with Dr Ana Sharma. Her daughter is present in the room he does offer some additional history. She does currently use Tudorza twice daily. She does have a prescription for Symbicort, however states her insurance company stopped covering this medication. She does still have some samples of this at home. She states she has enough Symbicort to last her another 4 to 6 weeks. She was placed on a prednisone taper several weeks ago by Dr. Ana Sharma. She states then she was given a prescription for 10 mg of prednisone daily by Dr. Yane lOiveira. She is yet to start this 30-day prescription. She does also use albuterol at home. She states between her MDI and nebulizer machine she does use albuterol on average 2-6 times per day. She states on a good day she can only walk about 30 feet before coming short of breath. She does use CPAP at night. She states she has been on this for over 5 years. Her pressure has never been adjusted. She states the only thing that has been adjusted  has been an increase in her oxygen that is bled into the machine.   This was recently increased to 5 L. Currently she states her breathing is doing fairly well. She does note shortness of breath with exertion. She denies any complaints of chest discomfort. Her main complaint at today's visit is secondary to back and leg pain. She states she is waiting further testing in regards to work-up of this. Pulmonary was consulted for outpatient recommendations. Past Medical History:        Diagnosis Date    Asthma     COPD (chronic obstructive pulmonary disease) ([facility]     Depression     Diabetes mellitus ([facility]        Past Surgical History:        Procedure Laterality Date    CHOLECYSTECTOMY     · Previous back surgery for herniated disc    Social History:     reports that she has quit smoking. She has never used smokeless tobacco. She reports previous alcohol use. She reports that she does not use drugs. She does have a 1 to 1.5 pack/day x 30 years smoking history. 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    Family History:   History reviewed. No pertinent family history.     Allergies:  Budesonide-formoterol fumarate        MEDICATIONS during current hospitalization:    Continuous Infusions:   dextrose         Scheduled Meds:   LORazepam  1 mg Intravenous Once    sodium chloride flush  10 mL Intravenous 2 times per day    enoxaparin  40 mg Subcutaneous Daily    aspirin  81 mg Oral Daily    Or    aspirin  300 mg Rectal Daily    atorvastatin  80 mg Oral Nightly    insulin lispro  0-12 Units Subcutaneous TID WC    insulin lispro  0-6 Units Subcutaneous Nightly    sodium chloride flush  10 mL Intravenous BID    albuterol  2.5 mg Nebulization BID       PRN Meds:acetaminophen, sodium chloride flush, promethazine **OR** ondansetron, polyethylene glycol, glucose, dextrose, glucagon (rDNA), dextrose, albuterol        REVIEW OF SYSTEMS:  ROS: 10 organs review of system is done including general, psychological, ENT, hematological, endocrine, respiratory, cardiovascular, gastrointestinal, musculoskeletal, neurological,  allergy and Immunology is done and is otherwise negative. PHYSICAL EXAM:    Vitals:  BP (!) 101/57   Pulse 73   Temp 97.2 °F (36.2 °C) (Oral)   Resp 18   Ht 5' 7\" (1.702 m)   Wt (!) 325 lb (147.4 kg)   SpO2 97%   BMI 50.90 kg/m²     General: No acute distress, sitting on the edge of the bed for the majority of the examination  HEENT: Normocephalic, atraumatic  Chest : Occasional squeak, no wheezes, no rhonchi, no respiratory distress at rest  Heart[de-identified] Regular rate and rhythm  ABD: Obese, positive bowel sounds, soft, nontender to palpation  Extremities : Warm, dry, 1+ bilateral lower extremity edema  Neuro: Awake and alert, oriented x4  Skin: No rashes appreciated    Data Review  Recent Labs     03/19/21  1115 03/20/21  0519   WBC 14.0* 12.0*   HGB 14.0 12.5   HCT 41.5 37.7   * 349      Recent Labs     03/19/21  1115      K 4.0   CL 98   CO2 32*   BUN 18   CREATININE 1.44*   GLUCOSE 183*       ABGs: No results for input(s): PHART, IMY8FRQ, PO2ART, OSN0ROH, BEART, Z5NRDEHX, OPL0NPR in the last 72 hours. O2 Device: PAP (positive airway pressure)  O2 Flow Rate (L/min): 5 L/min  No results found for: LACTA        Assessment/Plan:       1. Lower extremity weakness--she is currently undergoing work-up by the hospitalist service. She did have a CT scan of her head completed. She states she is waiting further testing in regards to this. 2. COPD--patient does have a known history of COPD. At this time she states her breathing is stable. She currently is in the midst of a prednisone taper. She has a few days of prednisone left before she starts her 10 mg daily. She does have a scheduled appointment to establish with Mary Bernardo in April. She currently is using [place] [employer] [place], Symbicort, and albuterol. We discussed that her inhaled medications may need to be adjusted. She states that Symbicort is no longer covered by her insurance.   We discussed that this would surely need to be changed to something that was more financially feasible for her. She may also benefit from the the addition of an oral medication. We also discussed that once she has had improvement of her lower extremity weakness/pain she would benefit from pulmonary rehab as an outpatient. It does sound as if this was recommended in the past.    3. Obstructive sleep apnea--patient does have a known history of FROYLAN and does use a CPAP machine. She has been on this for many years. She states she has had changes in her weight since the machine has been set up, however no retitration studies have been completed. Her daughter states that there is occasions she wakes up in the middle of night and her oxygen saturations are noted to be low. We discussed given that she has had changes in her weight since the machine has been set up she would benefit likely from a retitration study. This would also likely help control of her COPD. She may require BiPAP machine depending on the results of her retitration study. At this time given that the patient's symptoms are stable no changes to her regimen will be made. She should follow-up as an outpatient with Dr. Nora Ferrara as she is scheduled in April.     Thank you for consultation    Electronically signed by Tereso Caballero DO, on 3/20/2021 at 11:40 AM

## 2021-03-20 NOTE — PROGRESS NOTES
Hospitalist Progress Note      Date of Admission: 3/19/2021  Chief Complaint:    Chief Complaint   Patient presents with    Numbness     numbness to left leg and left arm starting 3 hours ago. Subjective:  No new complaints, no nausea vomiting chest pain or headache. Patient does have left leg discomfort and tingling in both feet. Left leg discomfort is associated with pain radiating from buttocks down to the outside of the leg. Also states she has some left upper extremity weakness.     Medications:    Infusion Medications    dextrose       Scheduled Medications    predniSONE  10 mg Oral Daily    sodium chloride flush  10 mL Intravenous 2 times per day    enoxaparin  40 mg Subcutaneous Daily    aspirin  81 mg Oral Daily    Or    aspirin  300 mg Rectal Daily    atorvastatin  80 mg Oral Nightly    insulin lispro  0-12 Units Subcutaneous TID WC    insulin lispro  0-6 Units Subcutaneous Nightly    sodium chloride flush  10 mL Intravenous BID    albuterol  2.5 mg Nebulization BID     PRN Meds: acetaminophen, sodium chloride flush, promethazine **OR** ondansetron, polyethylene glycol, glucose, dextrose, glucagon (rDNA), dextrose, albuterol    Intake/Output Summary (Last 24 hours) at 3/20/2021 1352  Last data filed at 3/19/2021 1754  Gross per 24 hour   Intake 350 ml   Output --   Net 350 ml     Exam:  BP (!) 101/57   Pulse 73   Temp 97.2 °F (36.2 °C) (Oral)   Resp 18   Ht 5' 7\" (1.702 m)   Wt (!) 325 lb (147.4 kg)   SpO2 97%   BMI 50.90 kg/m²   Head: Normocephalic, atraumatic  Sclera clear  Neck JVD flat  Lungs: Normal effort of breathing    Labs:   Recent Labs     03/19/21  1115 03/20/21  0519   WBC 14.0* 12.0*   HGB 14.0 12.5   HCT 41.5 37.7   * 349     Recent Labs     03/19/21  1115      K 4.0   CL 98   CO2 32*   BUN 18   CREATININE 1.44*   CALCIUM 9.7   AST 51*   ALT 51*   BILITOT 1.0*   ALKPHOS 95     Recent Labs     03/19/21  1115   INR 0.9     Recent Labs     03/19/21  1115 TROPONINI <0.010     Radiology:  MRI BRAIN W WO CONTRAST   Final Result    There are no acute intracranial changes, no evidence of ischemia or hemorrhage. There are no regions of signal abnormality. There is no abnormal enhancement after IV contrast administration. US DUP LOWER EXTREMITIES BILATERAL VENOUS   Final Result   NO SONOGRAPHIC EVIDENCE, DEEP VEIN THROMBOSIS, BILATERAL LOWER EXTREMITIES. XR CHEST PORTABLE   Final Result   No radiographic evidence of acute intrathoracic process. CT Head WO Contrast   Final Result      NO ACUTE INTRA-AXIAL OR EXTRA-AXIAL FINDINGS. IF SIGNS OR SYMPTOMS PERSIST THEN CONSIDER  MRI TO FURTHER EVALUATE IF THERE ARE NO CONTRAINDICATIONS      All CT scans at this facility use dose modulation, iterative reconstruction, and/or weight based dosing when appropriate to reduce radiation dose to as low as reasonably achievable. MRI LUMBAR SPINE WO CONTRAST    (Results Pending)   MRI CERVICAL SPINE WO CONTRAST    (Results Pending)     Assessment/Plan:    Left upper and lower extremity weakness: Negative for stroke based on radiology report of brain MRI. Neurology consulted. Symptoms regarding lower extremity are consistent with lumbar radiculopathy. Lumbar MRI pending. Left upper extremity symptoms noted, rule out cervical pathology. MRI pending. BMI of 50 meets criteria for morbid obesity    Diabetes: Continue Metformin. Based on review of outpatient records, patient has CKD stage III. Currently at baseline creatinine. Losartan on home medications noted. Currently normotensive. Continue to monitor blood pressure. Will not restart losartan at this time.     35 minutes total care time, >1/2 in unit/floor time and care coordination     Mel Fischer MD

## 2021-03-20 NOTE — PROGRESS NOTES
Pt c/o pain and numbness going down her left leg as well as her left arm from the elbow up. Rates it 7/10. Tylenol given. Home CPAP machine brought in by her daughter and set up by respiratory. Pt requested a breathing treatment for SOB and wheezing, which was ordered and given. C/O difficulty sleeping d/t the discomfort in her leg.

## 2021-03-20 NOTE — PROGRESS NOTES
[facility]   Facility/Department: [facility] [address]  Speech Language Pathology  Initial Speech/Language/Cognitive Assessment    Abi Mcknight  : 1955 (24 y.o.)   MRN: [id number]  ROOM: [place] [id number]  ADMISSION DATE: 3/19/2021  PATIENT DIAGNOSIS(ES): Left leg weakness [R29.898]  Chief Complaint   Patient presents with    Numbness     numbness to left leg and left arm starting 3 hours ago. Patient Active Problem List    Diagnosis Date Noted    Left leg weakness 2021     Past Medical History:   Diagnosis Date    Asthma     COPD [facility]chronic obstructive [facility] disease) [facility] [address] [facility]     Depression     Diabetes mellitus ([facility] [address]      Past Surgical History:   Procedure Laterality Date    CHOLECYSTECTOMY           DATE ONSET: 3/20/2021    Date of Evaluation: 3/20/2021   Evaluating Therapist: BRITANY Lange      Assessment:  Cognitive Diagnosis: Pt presents with min-mild cognitive-linguistic characterized by decreased STM negatively impacting her ability to safely perform ADLs upon discharge. However, pt independently implements strategies in order to compensate for deficits (routines, calendars, central location, etc.). Pt was educated on use of strategies and stated verbal understanding. No further follow-up is needed at this time. 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, but wears glasses)  Hearing  Hearing: Within functional limits        Objective:         Auditory Comprehension  Comprehension: Within Functional Limits     Expression  Primary Mode of Expression: Verbal    Verbal Expression  Verbal Expression: Within functional limits    Written Expression  Dominant Hand: Right  Written Expression: Unable to assess    Motor Speech  Motor Speech: Within Functional Limits    Pragmatics/Social Functioning  Pragmatics: Within functional limits    Cognition:      Orientation  Overall Orientation Status: Within Normal Limits  Attention  Attention: Within Functional Limits  Memory  Memory: Exceptions to [facility]  Short-term Memory: Moderate(Recalled 1 word after 5-min delay. Required category cue x1 and binary choice x1 to recall remaining words.)  Working Memory: [facility])  Problem Solving  Problem Solving: Within Functional Limits  Numeric Reasoning  Numeric Reasoning: Unable to assess  Abstract Reasoning  Abstract Reasoning: Within Functional Limits  Safety/Judgement  Safety/Judgement: Within Functional Limits    Additional Assessments:   N/A          Prognosis:  Individuals consulted  Consulted and agree with results and recommendations: Patient    Education:  Patient Education: Pt was educated on results of SLE  Patient Education Response: Verbalizes understanding  Safety Devices in place: Yes  Type of devices: Call light within reach; Bed alarm in place    Pain Assessment:  Initial Assessment:  Patient c/o: \"A little bit of pain\" on her butt  Patient reported an acceptable level for treatment.     Re-assessment:  Patient c/o: \"A little bit of pain\" on her butt      Therapy Time  SLP Individual Minutes  Time In: 3493  Time Out: 5900  Minutes: 17        Signature: Electronically signed by BRITANY Gentile on 3/20/2021 at 9:11 AM

## 2021-03-20 NOTE — CARE COORDINATION
Met with patient to discuss DC plan. Pt from home, with walker. Requesting DME for wheeled walker at discharge. MRIs pending. Pt at this time does not think she will have any needs at discharge, possible HHC. List is provided for review. Will follow as needed.

## 2021-03-20 NOTE — PROGRESS NOTES
be independent with transfers. Long term goal 3: Patient will ambulate 100 ft using ww independently. Long term goal 4: Patient will ascend/descend 4 steps using HR independently. [facility] (6 CLICK) BASIC MOBILITY  AM-PAC Inpatient Mobility Raw Score : 18     Therapy Time:   Individual   Time In 1106   Time Out 1123   Minutes [address] [place] [address], 03/20/21 at 11:34 AM         Definitions for assistance levels  Independent = pt does not require any physical supervision or assistance from another person for activity completion. Device may be needed.   Stand by assistance = pt requires verbal cues or instructions from another person, close to but not touching, to perform the activity  Minimal assistance= pt performs 75% or more of the activity; assistance is required to complete the activity  Moderate assistance= pt performs 50% of the activity; assistance is required to complete the activity  Maximal assistance = pt performs 25% of the activity; assistance is required to complete the activity  Dependent = pt requires total physical assistance to accomplish the task

## 2021-03-20 NOTE — PROGRESS NOTES
[facility] EMERGENCY [facility] AT [place] Respiratory Therapy Evaluation   Current Order: albuterol aerosol q6prn    Home Regimen: bid per pt     Ordering Physician: kerry  Re-evaluation Date:  3/22    Diagnosis: left leg weakness     Patient Status: Stable / Unstable + Physician notified    The following MDI Criteria must be met in order to convert aerosol to MDI with spacer.  If unable to meet, MDI will be converted to aerosol:  []  Patient able to demonstrate the ability to use MDI effectively  []  Patient alert and cooperative  []  Patient able to take deep breath with 5-10 second hold  []  Medication(s) available in this delivery method   []  Peak flow greater than or equal to 200 ml/min            Current Order Substituted To  (same drug, same frequency)   Aerosol to MDI [] Albuterol Sulfate 0.083% unit dose by aerosol Albuterol Sulfate MDI 2 puffs by inhalation with spacer    [] Levalbuterol 1.25 mg unit dose by aerosol Levalbuterol MDI 2 puffs by inhalation with spacer    [] Levalbuterol 0.63 mg unit dose by aerosol Levalbuterol MDI 2 puffs by inhalation with spacer    [] Ipratropium Bromide 0.02% unit dose by aerosol Ipratropium Bromide MDI 2 puffs by inhalation with spacer    [] Duoneb (Ipratropium + Albuterol) unit dose by aerosol Ipratropium MDI + Albuterol MDI 2 puffs by inhalation w/spacer   MDI to Aerosol [] Albuterol Sulfate MDI Albuterol Sulfate 0.083% unit dose by aerosol    [] Levalbuterol MDI 2 puffs by inhalation Levalbuterol 1.25 mg unit dose by aerosol    [] Ipratropium Bromide MDI by inhalation Ipratropium Bromide 0.02% unit dose by aerosol    [] Combivent (Ipratropium + Albuterol) MDI by inhalation Duoneb (Ipratropium + Albuterol) unit dose by aerosol       Treatment Assessment [Frequency/Schedule]:  Change frequency to: _albuterol  Bid _and q2prn________________________________________________per Protocol, P&T, MEC      Points 0 1 2 3 4   Pulmonary Status  Non-Smoker  []   Smoking history   < 20 pack years  [] Smoking history  ?  20 pack years  []   Pulmonary Disorder  (acute or chronic)  [x]   Severe or Chronic w/ Exacerbation  []     Surgical Status No [x]   Surgeries     General []   Surgery Lower []   Abdominal Thoracic or []   Upper Abdominal Thoracic with  PulmonaryDisorder  []     Chest X-ray Clear/Not  Ordered     [x]  Chronic Changes  Results Pending  []  Infiltrates, atelectasis, pleural effusion, or edema  []  Infiltrates in more than one lobe []  Infiltrate + Atelectasis, &/or pleural effusion  []    Respiratory Pattern Regular,  RR = 12-20 [x]  Increased,  RR = 21-25 []  PETERSON, irregular,  or RR = 26-30 []  Decreased FEV1  or RR = 31-35 []  Severe SOB, use  of accessory muscles, or RR ? 35  []    Mental Status Alert, oriented,  Cooperative [x]  Confused but Follows commands []  Lethargic or unable to follow commands []  Obtunded  []  Comatose  []    Breath Sounds Clear to  auscultation  []  Decreased unilaterally or  in bases only [x]  Decreased  bilaterally  []  Crackles or intermittent wheezes []  Wheezes []    Cough Strong, Spontan., & nonproductive [x]  Strong,  spontaneous, &  productive []  Weak,  Nonproductive []  Weak, productive or  with wheezes []  No spontaneous  cough or may require suctioning []    Level of Activity Ambulatory [x]  Ambulatory w/ Assist  []  Non-ambulatory []  Paraplegic []  Quadriplegic []    Total4  Score:__4_____     Triage Score:____5____      Tri       Triage:     1. (>20) Freq: Q3    2. (16-20) Freq: Q4   3. (11-15) Freq: QID & Albuterol Q2 PRN    4. (6-10) Freq: TID & Albuterol Q2 PRN    5. (0-5) Freq Q4prn

## 2021-03-21 VITALS
OXYGEN SATURATION: 99 % | RESPIRATION RATE: 18 BRPM | HEIGHT: 67 IN | HEART RATE: 76 BPM | WEIGHT: 293 LBS | BODY MASS INDEX: 45.99 KG/M2 | DIASTOLIC BLOOD PRESSURE: 100 MMHG | SYSTOLIC BLOOD PRESSURE: 136 MMHG | TEMPERATURE: 97.5 F

## 2021-03-21 PROBLEM — M99.53 INTERVERTEBRAL DISC STENOSIS OF NEURAL CANAL OF LUMBAR REGION: Status: ACTIVE | Noted: 2021-03-21

## 2021-03-21 PROBLEM — M46.1 INFLAMMATION OF LEFT SACROILIAC JOINT (HCC): Status: ACTIVE | Noted: 2021-03-21

## 2021-03-21 PROBLEM — M54.42 ACUTE BACK PAIN WITH SCIATICA, LEFT: Status: ACTIVE | Noted: 2021-03-21

## 2021-03-21 PROBLEM — G57.02 PIRIFORMIS SYNDROME OF LEFT SIDE: Status: ACTIVE | Noted: 2021-03-21

## 2021-03-21 PROBLEM — M96.1 POSTLAMINECTOMY SYNDROME, LUMBAR REGION: Status: ACTIVE | Noted: 2021-03-21

## 2021-03-21 LAB
GLUCOSE BLD-MCNC: 137 MG/DL (ref 60–115)
GLUCOSE BLD-MCNC: 201 MG/DL (ref 60–115)
GLUCOSE BLD-MCNC: 207 MG/DL (ref 60–115)
PERFORMED ON: ABNORMAL

## 2021-03-21 PROCEDURE — 99232 SBSQ HOSP IP/OBS MODERATE 35: CPT | Performed by: INTERNAL MEDICINE

## 2021-03-21 PROCEDURE — 6370000000 HC RX 637 (ALT 250 FOR IP): Performed by: ANESTHESIOLOGY

## 2021-03-21 PROCEDURE — 6370000000 HC RX 637 (ALT 250 FOR IP): Performed by: INTERNAL MEDICINE

## 2021-03-21 PROCEDURE — 94761 N-INVAS EAR/PLS OXIMETRY MLT: CPT

## 2021-03-21 PROCEDURE — 6370000000 HC RX 637 (ALT 250 FOR IP): Performed by: NURSE PRACTITIONER

## 2021-03-21 PROCEDURE — 2700000000 HC OXYGEN THERAPY PER DAY

## 2021-03-21 PROCEDURE — 6360000002 HC RX W HCPCS: Performed by: INTERNAL MEDICINE

## 2021-03-21 PROCEDURE — 99222 1ST HOSP IP/OBS MODERATE 55: CPT | Performed by: PSYCHIATRY & NEUROLOGY

## 2021-03-21 PROCEDURE — 94760 N-INVAS EAR/PLS OXIMETRY 1: CPT

## 2021-03-21 PROCEDURE — 2580000003 HC RX 258: Performed by: INTERNAL MEDICINE

## 2021-03-21 PROCEDURE — 94640 AIRWAY INHALATION TREATMENT: CPT

## 2021-03-21 RX ORDER — METAXALONE 800 MG/1
400 TABLET ORAL EVERY 6 HOURS PRN
Status: DISCONTINUED | OUTPATIENT
Start: 2021-03-21 | End: 2021-03-21 | Stop reason: HOSPADM

## 2021-03-21 RX ORDER — TRAMADOL HYDROCHLORIDE 50 MG/1
50 TABLET ORAL EVERY 6 HOURS PRN
Status: DISCONTINUED | OUTPATIENT
Start: 2021-03-21 | End: 2021-03-21 | Stop reason: HOSPADM

## 2021-03-21 RX ORDER — TRAMADOL HYDROCHLORIDE 50 MG/1
50 TABLET ORAL 3 TIMES DAILY PRN
Qty: 20 TABLET | Refills: 0 | Status: SHIPPED | OUTPATIENT
Start: 2021-03-21 | End: 2021-03-28

## 2021-03-21 RX ORDER — LIDOCAINE 4 G/G
2 PATCH TOPICAL DAILY
Qty: 60 PATCH | Refills: 1 | Status: SHIPPED | OUTPATIENT
Start: 2021-03-21 | End: 2021-03-30 | Stop reason: ALTCHOICE

## 2021-03-21 RX ORDER — BUDESONIDE AND FORMOTEROL FUMARATE DIHYDRATE 160; 4.5 UG/1; UG/1
2 AEROSOL RESPIRATORY (INHALATION) 2 TIMES DAILY
Status: DISCONTINUED | OUTPATIENT
Start: 2021-03-21 | End: 2021-03-21 | Stop reason: HOSPADM

## 2021-03-21 RX ORDER — GABAPENTIN 100 MG/1
100 CAPSULE ORAL 3 TIMES DAILY
Status: DISCONTINUED | OUTPATIENT
Start: 2021-03-21 | End: 2021-03-21 | Stop reason: HOSPADM

## 2021-03-21 RX ORDER — LIDOCAINE 4 G/G
2 PATCH TOPICAL DAILY
Status: DISCONTINUED | OUTPATIENT
Start: 2021-03-21 | End: 2021-03-21 | Stop reason: HOSPADM

## 2021-03-21 RX ORDER — GABAPENTIN 100 MG/1
100 CAPSULE ORAL 3 TIMES DAILY
Qty: 90 CAPSULE | Refills: 0 | Status: SHIPPED | OUTPATIENT
Start: 2021-03-21 | End: 2021-09-24 | Stop reason: SDUPTHER

## 2021-03-21 RX ORDER — METHOCARBAMOL 500 MG/1
500 TABLET, FILM COATED ORAL 3 TIMES DAILY PRN
Qty: 30 TABLET | Refills: 1 | Status: SHIPPED | OUTPATIENT
Start: 2021-03-21 | End: 2021-03-31

## 2021-03-21 RX ADMIN — TRAMADOL HYDROCHLORIDE 50 MG: 50 TABLET, FILM COATED ORAL at 14:52

## 2021-03-21 RX ADMIN — ACETAMINOPHEN 650 MG: 325 TABLET ORAL at 01:44

## 2021-03-21 RX ADMIN — GABAPENTIN 100 MG: 100 CAPSULE ORAL at 14:53

## 2021-03-21 RX ADMIN — ACETAMINOPHEN 650 MG: 325 TABLET ORAL at 07:20

## 2021-03-21 RX ADMIN — PREDNISONE 10 MG: 10 TABLET ORAL at 10:58

## 2021-03-21 RX ADMIN — SODIUM CHLORIDE, PRESERVATIVE FREE 10 ML: 5 INJECTION INTRAVENOUS at 10:59

## 2021-03-21 RX ADMIN — INSULIN LISPRO 4 UNITS: 100 INJECTION, SOLUTION INTRAVENOUS; SUBCUTANEOUS at 13:36

## 2021-03-21 RX ADMIN — METFORMIN HYDROCHLORIDE 500 MG: 500 TABLET ORAL at 10:58

## 2021-03-21 RX ADMIN — ALBUTEROL SULFATE 2.5 MG: 2.5 SOLUTION RESPIRATORY (INHALATION) at 13:45

## 2021-03-21 RX ADMIN — ALBUTEROL SULFATE 2.5 MG: 2.5 SOLUTION RESPIRATORY (INHALATION) at 07:34

## 2021-03-21 RX ADMIN — ENOXAPARIN SODIUM 40 MG: 40 INJECTION SUBCUTANEOUS at 10:58

## 2021-03-21 RX ADMIN — ASPIRIN 81 MG: 81 TABLET, COATED ORAL at 10:58

## 2021-03-21 RX ADMIN — METAXALONE 400 MG: 800 TABLET ORAL at 14:53

## 2021-03-21 ASSESSMENT — ENCOUNTER SYMPTOMS
TROUBLE SWALLOWING: 0
GASTROINTESTINAL NEGATIVE: 1
PHOTOPHOBIA: 0
RESPIRATORY NEGATIVE: 1
NAUSEA: 0
COLOR CHANGE: 0
BACK PAIN: 0
BACK PAIN: 1
SHORTNESS OF BREATH: 0
CHOKING: 0
EYES NEGATIVE: 1
ALLERGIC/IMMUNOLOGIC NEGATIVE: 1
VOMITING: 0

## 2021-03-21 ASSESSMENT — PAIN SCALES - GENERAL
PAINLEVEL_OUTOF10: 5
PAINLEVEL_OUTOF10: 6

## 2021-03-21 NOTE — FLOWSHEET NOTE
Pt resting in bed, complaints of pain/weakness to L leg.  Vss, spoke to daughter, she wants to speak to , pt also wants to shower will msg , pt calls appropriately, pleasant, cont to monitor Electronically signed by Genoveva Pearson RN on 3/21/2021 at 10:54 AM

## 2021-03-21 NOTE — PROGRESS NOTES
Pulmonary & Critical Care Medicine Progress Note    Subjective:     She did go for MRIs yesterday. She states her breathing has been stable. She states that she was doing well this morning, however now has some lower extremity pain. She denies any other complaints at this time.     EXAM:  General: No acute distress, resting comfortably in bed  HEENT: Normocephalic, atraumatic, pupils equal round and reactive to light  Lungs : Clear to auscultation, no wheezes, no respiratory distress  Heart: Regular rate and rhythm  ABD: Obese, positive bowel sounds, soft, nontender  Extremities : Warm, dry, similar edema noted  Neuro: Awake and alert, oriented x4  Skin: No rashes    IV:   dextrose         Vitals:  BP (!) 136/100   Pulse 76   Temp 97.5 °F (36.4 °C) (Oral)   Resp 18   Ht 5' 7\" (1.702 m)   Wt (!) 325 lb (147.4 kg)   SpO2 99%   BMI 50.90 kg/m²        No intake or output data in the 24 hours ending 03/21/21 1103    Medications:  Scheduled Meds:   predniSONE  10 mg Oral Daily    metFORMIN  500 mg Oral BID WC    sodium chloride flush  10 mL Intravenous 2 times per day    enoxaparin  40 mg Subcutaneous Daily    aspirin  81 mg Oral Daily    Or    aspirin  300 mg Rectal Daily    atorvastatin  80 mg Oral Nightly    insulin lispro  0-12 Units Subcutaneous TID WC    insulin lispro  0-6 Units Subcutaneous Nightly    sodium chloride flush  10 mL Intravenous BID    albuterol  2.5 mg Nebulization BID       Labs:   CBC:   Recent Labs     03/19/21  1115 03/20/21  0519   WBC 14.0* 12.0*   HGB 14.0 12.5   HCT 41.5 37.7   MCV 91.3 91.7   * 349     BMP:   Recent Labs     03/19/21  1115      K 4.0   CL 98   CO2 32*   BUN 18   CREATININE 1.44*     LIVER PROFILE:   Recent Labs     03/19/21  1115   AST 51*   ALT 51*   BILITOT 1.0*   ALKPHOS 95     PT/INR:   Recent Labs     03/19/21  1115   PROTIME 12.7   INR 0.9     APTT:   Recent Labs     03/19/21  1115   APTT 24.4     UA:No results for input(s): NITRITE, Joseph Carter, LABCAST, WBCUA, RBCUA, MUCUS, TRICHOMONAS, YEAST, BACTERIA, CLARITYU, SPECGRAV, LEUKOCYTESUR, UROBILINOGEN, BILIRUBINUR, BLOODU, GLUCOSEU, AMORPHOUS in the last 72 hours. Invalid input(s): [facility]      Assessment/Plan:    1. Lower extremity weakness-she did undergo MRIs yesterday. Neurology did evaluate the patient today as well. Continue with work-up per the hospitalist and neurology. 2. COPD-at this time she states her breathing has been stable. She should continue on her current regimen which includes prednisone on a daily basis until she is evaluated by pulmonary as an outpatient. She has a scheduled appointment in April. 3. Obstructive sleep apnea-she remains compliant with her machine. As discussed yesterday, she may benefit from a retitration study in the future. She is currently stable from a pulmonary perspective and can be discharged home once otherwise medically stable. She does continue to complain of some lower extremity pain. She should follow with Dr Marsha Boss as scheduled in April.     Electronically signed by Colonel Daphney DO, on 3/21/2021 at 11:03 AM

## 2021-03-21 NOTE — DISCHARGE SUMMARY
Hospital Medicine Discharge Summary    Crystal Frost  :  1955  MRN:  [id number]    Admit date:  3/19/2021  Discharge date:  3/21/2021    Admitting Physician:  Sherry Rust MD  Primary Care Physician:  Naomie Michael MD    Crystal Frost is a 74 y.o. female that was admitted and treated at [facility] for the following medical issues: Active Problems:    Left leg weakness    Inflammation of left sacroiliac joint (HCC)    Piriformis syndrome of left side    Acute back pain with sciatica, left    Intervertebral disc stenosis of neural canal of lumbar region    Postlaminectomy syndrome, lumbar region  Resolved Problems:    * No resolved hospital problems. *      Discharge Diagnoses: Active Problems:    Left leg weakness    Inflammation of left sacroiliac joint (HCC)    Piriformis syndrome of left side    Acute back pain with sciatica, left    Intervertebral disc stenosis of neural canal of lumbar region    Postlaminectomy syndrome, lumbar region  Resolved Problems:    * No resolved hospital problems. *    Chief Complaint   Patient presents with    Numbness     numbness to left leg and left arm starting 3 hours ago. Hospital Course:   Crystal Frost is a 74 y.o. female who was admitted to the hospital with left leg and left arm weakness. Negative for stroke. Also had sciatic pain down her left leg. Lumbar and cervical MRI negative for acute pathology. Degenerative spine disease noted. Seen by neurology, and pain management. Subsequently discharged with outpatient follow-up. Muscle relaxants and pain medications initiated by pain management. Pt was discharge in a stable condition.        BP (!) 136/100   Pulse 76   Temp 97.5 °F (36.4 °C) (Oral)   Resp 18   Ht 5' 7\" (1.702 m)   Wt (!) 325 lb (147.4 kg)   SpO2 99%   BMI 50.90 kg/m²     Patient was seen by the following consultants while admitted to [facility]:   Consults:  [facility] PULMONOLOGY  IP CONSULT TO NEUROLOGY  IP CONSULT TO PAIN MANAGEMENT    Significant Diagnostic Studies:    Refer to chart if  Ct Head Wo Contrast    Result Date: 3/19/2021  CT HEAD WO CONTRAST CLINICAL HISTORY: Numbness left leg, left arm. COMPARISON: None TECHNIQUE: Multiple unenhanced serial axial images of the brain from the vertex of the skull to the base of the skull were performed. FINDINGS: The ventricles are dilated. This is compensatory to the surrounding moderate generalized parenchymal volume loss. No mass. No midline shift. The cisterns are patent. There are white matter and periventricular changes most likely consistent with chronic small vessel disease. No acute intra-axial or extra-axial findings. The visualized osseous structures are unremarkable. The visualized portion of the paranasal sinuses, and mastoids are unremarkable. Both globes are intact. No gross preseptal or post septal findings. NO ACUTE INTRA-AXIAL OR EXTRA-AXIAL FINDINGS. IF SIGNS OR SYMPTOMS PERSIST THEN CONSIDER  MRI TO FURTHER EVALUATE IF THERE ARE NO CONTRAINDICATIONS All CT scans at this facility use dose modulation, iterative reconstruction, and/or weight based dosing when appropriate to reduce radiation dose to as low as reasonably achievable. Xr Chest Portable    Result Date: 3/19/2021  Exam: XR CHEST PORTABLE History:  CVA Technique: AP portable view of the chest obtained. Comparison: None Chest x-ray portable Findings: The cardiomediastinal silhouette is within normal limits. There are no infiltrates, consolidations or effusions. Bones of the thorax appear intact. No radiographic evidence of acute intrathoracic process. Mri Brain W Wo Contrast    Result Date: 3/19/2021  EXAMINATION: MRI BRAIN W WO CONTRAST CLINICAL HISTORY:  left leg and subsequently arm weakness and numbness.  r/o stroke COMPARISONS: NONE AVAILABLE TECHNIQUE: Multiplanar multisequence images of the brain were obtained before and after IV administration of contrast. Diffusion perfusion imaging was obtained. FINDINGS:  There are no extra-axial collections. There is no evidence of hemorrhage. There are no areas of perfusion diffusion signal abnormality to suggest ischemia. The susceptibility images do not demonstrate evidence of hemosiderin deposition within the brain parenchyma or the leptomeninges. There is preservation of the gray-white matter differentiation. There are no areas of signal abnormality within the brain parenchyma or the posterior fossa to suggest lesion. There are no areas of abnormal enhancement after IV contrast administration. The sulci and ventricles are within normal limits without evidence of hydrocephalus. The midline structures are intact, the corpus callosum is within normal limits. The region of the pineal gland and the sella turcica are unremarkable. There are no space-occupying lesions in the posterior fossa. The basilar cisterns are patent. The craniocervical junction is unremarkable. The visualized portions of the orbits are within normal limits, the globes are intact. The visualized portions of the paranasal sinuses are within normal limits. The calvarium and soft tissues are unremarkable. There are no acute intracranial changes, no evidence of ischemia or hemorrhage. There are no regions of signal abnormality. There is no abnormal enhancement after IV contrast administration. Us Dup Lower Extremities Bilateral Venous    Result Date: 3/19/2021  EXAMINATION: US DUP LOWER EXTREMITIES BILATERAL VENOUS CLINICAL HISTORY: BILATERAL LOWER EXTREMITY PAIN AND SWELLING. FINDINGS: Compression, augmentation, and color flow, is demonstrated bilaterally at the level of the common femoral veins, proximal, mid, and distal superficial femoral veins, and popliteal veins. Compression and color flow is identified bilaterally within the infrapopliteal venous vasculature.      NO SONOGRAPHIC EVIDENCE, DEEP VEIN THROMBOSIS, BILATERAL LOWER EXTREMITIES. Discharge Medications:       Paige Luke, [address], Susan. \Hospital Has a New Name and Outlook.\"" Medication Instructions ZVX:[id number]    Printed on:03/21/21 6621   Medication Information                      aclidinium (TUDORZA PRESSAIR) 400 MCG/ACT AEPB inhaler  Inhale 1 puff into the lungs 2 times daily             albuterol (PROVENTIL) (2.5 MG/3ML) 0.083% nebulizer solution  Take 2.5 mg by nebulization every 6 hours as needed for Wheezing             albuterol sulfate HFA (VENTOLIN HFA) 108 (90 Base) MCG/ACT inhaler  Inhale 2 puffs into the lungs every 6 hours as needed for Wheezing             aspirin 81 MG EC tablet  Take 81 mg by mouth daily             budesonide-formoterol (SYMBICORT) 160-4.5 MCG/ACT AERO  Inhale 2 puffs into the lungs 2 times daily             DULoxetine (CYMBALTA) 60 MG extended release capsule  Take 60 mg by mouth daily             gabapentin (NEURONTIN) 100 MG capsule  Take 1 capsule by mouth 3 times daily for 30 days. lidocaine 4 % external patch  Place 2 patches onto the skin daily To  Lower back             losartan (COZAAR) 25 MG tablet  Take 25 mg by mouth every evening             metFORMIN (GLUCOPHAGE) 500 MG tablet  Take 1,000 mg by mouth Daily with supper             methocarbamol (ROBAXIN) 500 MG tablet  Take 1 tablet by mouth 3 times daily as needed (muscle spasm)             montelukast (SINGULAIR) 10 MG tablet  Take 10 mg by mouth nightly             potassium chloride (KLOR-CON) 20 MEQ packet  Take 20 mEq by mouth 2 times daily             pravastatin (PRAVACHOL) 20 MG tablet  Take 20 mg by mouth nightly             predniSONE (DELTASONE) 10 MG tablet  Take 1 tablet by mouth daily for 5 days             torsemide (DEMADEX) 20 MG tablet  Take 20 mg by mouth daily             traMADol (ULTRAM) 50 MG tablet  Take 1 tablet by mouth 3 times daily as needed for Pain (for severe pain to take wiuth Tylenol , NO NSAIDs) for up to 7 days.              vitamin D (CHOLECALCIFEROL) 25 MCG (1000 UT) TABS tablet  Take 1,000 Units by mouth nightly                 Disposition:   If discharged to Home, Any [facility] AT [facility] needs that were indicated and/or required as been addressed and set up by Social Work. Condition at discharge: Pt was medically stable at the time of discharge. Activity: activity as tolerated    Total time taken for discharging this patient: 40 minutes. Greater than 70% of time was spent focused exclusively on this patient. Time was taken to review chart, discuss plans with consultants, reconciling medications, discussing plan answering questions with patient.      Signed:  Mel Fischer

## 2021-03-21 NOTE — CONSULTS
resource strain: Not on file    Food insecurity     Worry: Not on file     Inability: Not on file    Transportation needs     Medical: Not on file     Non-medical: Not on file   Tobacco Use    Smoking status: Former Smoker    Smokeless tobacco: Never Used   Substance and Sexual Activity    Alcohol use: Not Currently     Comment: occass    Drug use: Never    Sexual activity: Not on file   Lifestyle    Physical activity     Days per week: Not on file     Minutes per session: Not on file    Stress: Not on file   Relationships    Social connections     Talks on phone: Not on file     Gets together: Not on file     Attends Yazdanism service: Not on file     Active member of club or organization: Not on file     Attends meetings of clubs or organizations: Not on file     Relationship status: Not on file    Intimate partner violence     Fear of current or ex partner: Not on file     Emotionally abused: Not on file     Physically abused: Not on file     Forced sexual activity: Not on file   Other Topics Concern    Not on file   Social History Narrative    Not on file     History reviewed. No pertinent family history. Allergies   Allergen Reactions    Budesonide-Formoterol Fumarate      Other reaction(s): THRUSH  Other reaction(s):  Other: See Comments  thrush     Current Facility-Administered Medications   Medication Dose Route Frequency Provider Last Rate Last Admin    tiotropium (SPIRIVA RESPIMAT) 2.5 MCG/ACT inhaler 2 puff  2 puff Inhalation Daily Joann Narayanan DO        budesonide-formoterol (SYMBICORT) 160-4.5 MCG/ACT inhaler 2 puff  2 puff Inhalation BID Geovani Rose DO        acetaminophen (TYLENOL) tablet 650 mg  650 mg Oral Q4H PRN DONNIE Ravi - CNP   650 mg at 03/21/21 0720    predniSONE (DELTASONE) tablet 10 mg  10 mg Oral Daily Joann Hayes DO   10 mg at 03/21/21 1058    metFORMIN (GLUCOPHAGE) tablet 500 mg  500 mg Oral BID  Tosha Finch MD   500 mg at 03/21/21 1058    sodium chloride flush 0.9 % injection 10 mL  10 mL Intravenous 2 times per day Sierra Perez MD   10 mL at 03/21/21 1059    sodium chloride flush 0.9 % injection 10 mL  10 mL Intravenous PRN Sierra Perez MD        promethazine (PHENERGAN) tablet 12.5 mg  12.5 mg Oral Q6H PRN Sierra Perez MD        Or    ondansetron (ZOFRAN) injection 4 mg  4 mg Intravenous Q6H PRN Sierra Perez MD        polyethylene glycol (GLYCOLAX) packet 17 g  17 g Oral Daily PRN Sierra Perez MD        enoxaparin (LOVENOX) injection 40 mg  40 mg Subcutaneous Daily Sierra Perez MD   40 mg at 03/21/21 1058    aspirin EC tablet 81 mg  81 mg Oral Daily Sierra Perez MD   81 mg at 03/21/21 1058    Or    aspirin suppository 300 mg  300 mg Rectal Daily Sierra Perez MD        atorvastatin (LIPITOR) tablet 80 mg  80 mg Oral Nightly Sierra Perez MD   80 mg at 03/20/21 2137    insulin lispro (HUMALOG) injection vial 0-12 Units  0-12 Units Subcutaneous TID WC Wilma Fanning, APRN - NP   2 Units at 03/20/21 1738    insulin lispro (HUMALOG) injection vial 0-6 Units  0-6 Units Subcutaneous Nightly Wilma Fanning, APRN - NP   3 Units at 03/20/21 2137    glucose (GLUTOSE) 40 % oral gel 15 g  15 g Oral PRN Wilma Fanning, APRN - NP        dextrose 50 % IV solution  12.5 g Intravenous PRN Wilma Fanning, APRN - NP        glucagon (rDNA) injection 1 mg  1 mg Intramuscular PRN Wilma Fanning, APRN - NP        dextrose 5 % solution  100 mL/hr Intravenous PRN Wilma Fanning, APRN - NP        sodium chloride flush 0.9 % injection 10 mL  10 mL Intravenous BID Sierra Perez MD   10 mL at 03/20/21 1037    albuterol (PROVENTIL) nebulizer solution 2.5 mg  2.5 mg Nebulization BID Sierra Perez MD   2.5 mg at 03/21/21 0734    albuterol (PROVENTIL) nebulizer solution 2.5 mg  2.5 mg Nebulization Q2H PRN Sierra Perez MD   2.5 mg at 03/20/21 1536        Objective:   BP (!) 136/100   Pulse 76   Temp 97.5 °F (36.4 °C) (Oral)   Resp 18   Ht 5' 7\" (1.702 m)   Wt (!) 325 lb (147.4 kg)   SpO2 99%   BMI 50.90 kg/m²     Physical Exam  Vitals signs reviewed. Eyes:      Pupils: Pupils are equal, round, and reactive to light. Neck:      Musculoskeletal: Normal range of motion. Cardiovascular:      Rate and Rhythm: Normal rate and regular rhythm. Heart sounds: No murmur. Pulmonary:      Effort: Pulmonary effort is normal.      Breath sounds: Normal breath sounds. Abdominal:      General: Bowel sounds are normal.   Musculoskeletal: Normal range of motion. Skin:     General: Skin is warm. Neurological:      Mental Status: She is alert and oriented to person, place, and time. Cranial Nerves: No cranial nerve deficit. Sensory: No sensory deficit. Motor: No abnormal muscle tone. Coordination: Coordination normal.      Deep Tendon Reflexes: Reflexes are normal and symmetric. Babinski sign absent on the right side. Babinski sign absent on the left side. Psychiatric:         Mood and Affect: Mood normal.     Patient is areflexic in the lower extremity and has tenderness of the ankle on the left. She has limited SLRs of 40 degrees on the left. She is ambulatory. Then she will levels are very patchy in the L5-S1 distribution on the left    Ct Head Wo Contrast    Result Date: 3/19/2021  CT HEAD WO CONTRAST CLINICAL HISTORY: Numbness left leg, left arm. COMPARISON: None TECHNIQUE: Multiple unenhanced serial axial images of the brain from the vertex of the skull to the base of the skull were performed. FINDINGS: The ventricles are dilated. This is compensatory to the surrounding moderate generalized parenchymal volume loss. No mass. No midline shift. The cisterns are patent. There are white matter and periventricular changes most likely consistent with chronic small vessel disease. No acute intra-axial or extra-axial findings. The visualized osseous structures are unremarkable.  The visualized portion of the paranasal sinuses, and mastoids are unremarkable. Both globes are intact. No gross preseptal or post septal findings. NO ACUTE INTRA-AXIAL OR EXTRA-AXIAL FINDINGS. IF SIGNS OR SYMPTOMS PERSIST THEN CONSIDER  MRI TO FURTHER EVALUATE IF THERE ARE NO CONTRAINDICATIONS All CT scans at this facility use dose modulation, iterative reconstruction, and/or weight based dosing when appropriate to reduce radiation dose to as low as reasonably achievable. Xr Chest Portable    Result Date: 3/19/2021  Exam: XR CHEST PORTABLE History:  CVA Technique: AP portable view of the chest obtained. Comparison: None Chest x-ray portable Findings: The cardiomediastinal silhouette is within normal limits. There are no infiltrates, consolidations or effusions. Bones of the thorax appear intact. No radiographic evidence of acute intrathoracic process. Mri Brain W Wo Contrast    Result Date: 3/19/2021  EXAMINATION: MRI BRAIN W WO CONTRAST CLINICAL HISTORY:  left leg and subsequently arm weakness and numbness. r/o stroke COMPARISONS: NONE AVAILABLE TECHNIQUE: Multiplanar multisequence images of the brain were obtained before and after IV administration of contrast. Diffusion perfusion imaging was obtained. FINDINGS:  There are no extra-axial collections. There is no evidence of hemorrhage. There are no areas of perfusion diffusion signal abnormality to suggest ischemia. The susceptibility images do not demonstrate evidence of hemosiderin deposition within the brain parenchyma or the leptomeninges. There is preservation of the gray-white matter differentiation. There are no areas of signal abnormality within the brain parenchyma or the posterior fossa to suggest lesion. There are no areas of abnormal enhancement after IV contrast administration. The sulci and ventricles are within normal limits without evidence of hydrocephalus.  The midline structures are intact, the corpus callosum is within normal limits. The region of the pineal gland and the sella turcica are unremarkable. There are no space-occupying lesions in the posterior fossa. The basilar cisterns are patent. The craniocervical junction is unremarkable. The visualized portions of the orbits are within normal limits, the globes are intact. The visualized portions of the paranasal sinuses are within normal limits. The calvarium and soft tissues are unremarkable. There are no acute intracranial changes, no evidence of ischemia or hemorrhage. There are no regions of signal abnormality. There is no abnormal enhancement after IV contrast administration. Us Dup Lower Extremities Bilateral Venous    Result Date: 3/19/2021  EXAMINATION: US DUP LOWER EXTREMITIES BILATERAL VENOUS CLINICAL HISTORY: BILATERAL LOWER EXTREMITY PAIN AND SWELLING. FINDINGS: Compression, augmentation, and color flow, is demonstrated bilaterally at the level of the common femoral veins, proximal, mid, and distal superficial femoral veins, and popliteal veins. Compression and color flow is identified bilaterally within the infrapopliteal venous vasculature. NO SONOGRAPHIC EVIDENCE, DEEP VEIN THROMBOSIS, BILATERAL LOWER EXTREMITIES.       Lab Results   Component Value Date    WBC 12.0 03/20/2021    RBC 4.11 03/20/2021    HGB 12.5 03/20/2021    HCT 37.7 03/20/2021    MCV 91.7 03/20/2021    MCH 30.4 03/20/2021    MCHC 33.1 03/20/2021    RDW 13.4 03/20/2021     03/20/2021     Lab Results   Component Value Date     03/19/2021    K 4.0 03/19/2021    CL 98 03/19/2021    CO2 32 03/19/2021    BUN 18 03/19/2021    CREATININE 1.44 03/19/2021    GFRAA 43.8 03/19/2021    LABGLOM 36.2 03/19/2021    GLUCOSE 183 03/19/2021    PROT 6.8 03/19/2021    LABALBU 3.8 03/19/2021    CALCIUM 9.7 03/19/2021    BILITOT 1.0 03/19/2021    ALKPHOS 95 03/19/2021    AST 51 03/19/2021    ALT 51 03/19/2021     Lab Results   Component Value Date    PROTIME 12.7 03/19/2021    INR 0.9 03/19/2021 No results found for: TSH, XAOFLFVC95, FOLATE, FERRITIN, IRON, TIBC, PTRFSAT, TSH, FREET4  Lab Results   Component Value Date    TRIG 101 03/20/2021    HDL 51 03/20/2021    LDLCALC 87 03/20/2021     No results found for: LABAMPH, BARBSCNU, LABBENZ, CANNAB, COCAINESCRN, LABMETH, OPIATESCREENURINE, PHENCYCLIDINESCREENURINE, PPXUR, ETOH  No results found for: LITHIUM, DILFRTOT, VALPROATE    Assessment:   Lumbar canal stenosis with left lower extremity weakness. These findings do not appear to be cerebral in nature. MRI of the brain was reviewed and this is normal.  Cervical spine MRI is normal.  Lumbar spine MRI shows stenosis at L3-L4 which is very focal.  This is not a good quality MRI though. We will await discussion with radiology regarding the same and consider no surgical intervention and evaluation if needed. Patient has some minor gait issues from a previous lumbar laminectomy 1985 which is residual.  This consultation includes my consultation the emergency room yesterday    Darci Bonilla MD, [place] American Board of Psychiatry & Neurology  Board Certified in Vascular Neurology  Board Certified in Neuromuscular Medicine  Certified in [facility]:

## 2021-03-21 NOTE — FLOWSHEET NOTE
Pt vss, pt discharging home outpt to follow up w/pain mgmnt, no needs at this time, daughter to pickup around 5:00, cont to monitor Electronically signed by Rosaura Gibbs RN on 3/21/2021 at 4:12 PM

## 2021-03-21 NOTE — CONSULTS
INITIAL CONSULT -PAIN MANAGEMENT     SERVICE DATE:  3/21/2021   SERVICE TIME:  1:22 PM  Admission date 3/19/2021  REASON FORCONSULT: Severe left leg pain and weakness diagnosis  REQUESTING PHYSICIAN:  Dr Jenni Pressley MD    Chief Complaint   Patient presents with    Numbness     numbness to left leg and left arm starting 3 hours ago. HISTORY OF PRESENTILLNESS:  Ms. Immanuel Tan is a 74 y.o. female who presents for [facility] after having progressive left leg pain and intermittent slight weakness and causing her difficulty walking. Patient was mainly concerned about the numbness and the pain that causing her some difficulty walking. Patient came through the emergency room, initially diagnosed to rule out stroke, she has extensive brain imaging and ultrasound to rule out blood clot. Imaging of the brain came back negative although no evidence of DVT. Patient is also cervical lumbar MRI ordered, she has history of prior laminectomy back in 1985, she was able to function and ambulating well, she had the problem after the surgery and before the surgery was related more to the left leg. Patient stated that she was always favoring the right leg and she had problem with the left side. Patient stated that she has no numbness weakness in her face no numbness anywhere else she has no issue with bladder bowel control. She is apparently not take anything for pain, she ruled from any acute stroke and was diagnosed by probably related to her spine. I was consulted, evaluate the patient and provide recommendation for her care. Also in her lab noted the patient has some chronic renal insufficiency as noted.     PAIN  ASSESSMENT:    constant, waxing and waning, moderate    aching, sharp, shooting, stabbing and throbbing    pain is perceived as severe (6-8 pain scale)    PAST MEDICAL HISTORY:    Past Medical History:   Diagnosis Date    Asthma     [facility]     Depression     Diabetes mellitus ([facility]      PAST SURGICAL HISTORY:    Past Surgical History:   Procedure Laterality Date    CHOLECYSTECTOMY       FAMILY HISTORY:  History reviewed. No pertinent family history. SOCIALHISTORY:    Social History     Socioeconomic History    Marital status:       Spouse name: Not on file    Number of children: Not on file    Years of education: Not on file    Highest education level: Not on file   Occupational History    Not on file   Social Needs    Financial resource strain: Not on file    Food insecurity     Worry: Not on file     Inability: Not on file    Transportation needs     Medical: Not on file     Non-medical: Not on file   Tobacco Use    Smoking status: Former Smoker    Smokeless tobacco: Never Used   Substance and Sexual Activity    Alcohol use: Not Currently     Comment: occass    Drug use: Never    Sexual activity: Not on file   Lifestyle    Physical activity     Days per week: Not on file     Minutes per session: Not on file    Stress: Not on file   Relationships    Social connections     Talks on phone: Not on file     Gets together: Not on file     Attends Jain service: Not on file     Active member of club or organization: Not on file     Attends meetings of clubs or organizations: Not on file     Relationship status: Not on file    Intimate partner violence     Fear of current or ex partner: Not on file     Emotionally abused: Not on file     Physically abused: Not on file     Forced sexual activity: Not on file   Other Topics Concern    Not on file   Social History Narrative    Not on file     PSYCHOLOGICAL HISTORY: none    MEDICATIONS:  Medications Prior to Admission: albuterol (PROVENTIL) (2.5 MG/3ML) 0.083% nebulizer solution, Take 2.5 mg by nebulization every 6 hours as needed for Wheezing  albuterol sulfate HFA (VENTOLIN HFA) 108 (90 Base) MCG/ACT inhaler, Inhale 2 puffs into the lungs every 6 hours as needed for Wheezing  aclidinium (TUDORZA PRESSAIR) 400 MCG/ACT AEPB inhaler, Inhale 1 puff into the lungs 2 times daily  budesonide-formoterol (SYMBICORT) 160-4.5 MCG/ACT AERO, Inhale 2 puffs into the lungs 2 times daily  torsemide (DEMADEX) 20 MG tablet, Take 20 mg by mouth daily  potassium chloride (KLOR-CON) 20 MEQ packet, Take 20 mEq by mouth 2 times daily  DULoxetine (CYMBALTA) 60 MG extended release capsule, Take 60 mg by mouth daily  aspirin 81 MG EC tablet, Take 81 mg by mouth daily  metFORMIN (GLUCOPHAGE) 500 MG tablet, Take 1,000 mg by mouth Daily with supper  montelukast (SINGULAIR) 10 MG tablet, Take 10 mg by mouth nightly  pravastatin (PRAVACHOL) 20 MG tablet, Take 20 mg by mouth nightly  losartan (COZAAR) 25 MG tablet, Take 25 mg by mouth every evening  vitamin D (CHOLECALCIFEROL) 25 MCG (1000 UT) TABS tablet, Take 1,000 Units by mouth nightly  predniSONE (DELTASONE) 10 MG tablet, Take 1 tablet by mouth daily for 5 days (Patient taking differently: Take 10 mg by mouth daily For 15 more days)  [unfilled]    ALLERGIES:  Budesonide-formoterol fumarate    COMPLETE REVIEW OF SYSTEMS:  As noted in HPI, 12 point ROS reviewed and otherwise negative. Review of Systems   Constitutional: Positive for activity change and fatigue. Negative for appetite change, chills, diaphoresis, fever and unexpected weight change. HENT: Negative. Eyes: Negative. Respiratory: Negative. Cardiovascular: Negative. Gastrointestinal: Negative. Endocrine: Negative. Genitourinary: Negative. Musculoskeletal: Positive for arthralgias, back pain, gait problem and myalgias. Negative for joint swelling, neck pain and neck stiffness. Skin: Negative. Allergic/Immunologic: Negative. Neurological: Positive for numbness. Negative for dizziness, tremors, seizures, syncope, facial asymmetry, speech difficulty, weakness, light-headedness and headaches. Hematological: Negative.     Psychiatric/Behavioral: Negative for AST 51 03/19/2021    BILITOT 1.0 03/19/2021    LABALBU 3.8 03/19/2021     No results for input(s): LABAMPH, BARBSCNU, LABBENZ, CANSU, COCAIMETSCRU, OPIATESCREENURINE, OXYCODONEUR, DSCOMMENT in the last 72 hours. Invalid input(s): PHENCYCLIDINESCREENURINE. LABMETH. PROPOX   Ct Head Wo Contrast    Result Date: 3/19/2021  CT HEAD WO CONTRAST CLINICAL HISTORY: Numbness left leg, left arm. COMPARISON: None TECHNIQUE: Multiple unenhanced serial axial images of the brain from the vertex of the skull to the base of the skull were performed. FINDINGS: The ventricles are dilated. This is compensatory to the surrounding moderate generalized parenchymal volume loss. No mass. No midline shift. The cisterns are patent. There are white matter and periventricular changes most likely consistent with chronic small vessel disease. No acute intra-axial or extra-axial findings. The visualized osseous structures are unremarkable. The visualized portion of the paranasal sinuses, and mastoids are unremarkable. Both globes are intact. No gross preseptal or post septal findings. NO ACUTE INTRA-AXIAL OR EXTRA-AXIAL FINDINGS. IF SIGNS OR SYMPTOMS PERSIST THEN CONSIDER  MRI TO FURTHER EVALUATE IF THERE ARE NO CONTRAINDICATIONS All CT scans at this facility use dose modulation, iterative reconstruction, and/or weight based dosing when appropriate to reduce radiation dose to as low as reasonably achievable. Xr Chest Portable    Result Date: 3/19/2021  Exam: XR CHEST PORTABLE History:  CVA Technique: AP portable view of the chest obtained. Comparison: None Chest x-ray portable Findings: The cardiomediastinal silhouette is within normal limits. There are no infiltrates, consolidations or effusions. Bones of the thorax appear intact. No radiographic evidence of acute intrathoracic process.      Mri Brain W Wo Contrast    Result Date: 3/19/2021  EXAMINATION: MRI BRAIN W WO CONTRAST CLINICAL HISTORY:  left leg and subsequently arm weakness and numbness. r/o stroke COMPARISONS: NONE AVAILABLE TECHNIQUE: Multiplanar multisequence images of the brain were obtained before and after IV administration of contrast. Diffusion perfusion imaging was obtained. FINDINGS:  There are no extra-axial collections. There is no evidence of hemorrhage. There are no areas of perfusion diffusion signal abnormality to suggest ischemia. The susceptibility images do not demonstrate evidence of hemosiderin deposition within the brain parenchyma or the leptomeninges. There is preservation of the gray-white matter differentiation. There are no areas of signal abnormality within the brain parenchyma or the posterior fossa to suggest lesion. There are no areas of abnormal enhancement after IV contrast administration. The sulci and ventricles are within normal limits without evidence of hydrocephalus. The midline structures are intact, the corpus callosum is within normal limits. The region of the pineal gland and the sella turcica are unremarkable. There are no space-occupying lesions in the posterior fossa. The basilar cisterns are patent. The craniocervical junction is unremarkable. The visualized portions of the orbits are within normal limits, the globes are intact. The visualized portions of the paranasal sinuses are within normal limits. The calvarium and soft tissues are unremarkable. There are no acute intracranial changes, no evidence of ischemia or hemorrhage. There are no regions of signal abnormality. There is no abnormal enhancement after IV contrast administration. Us Dup Lower Extremities Bilateral Venous    Result Date: 3/19/2021  EXAMINATION: US DUP LOWER EXTREMITIES BILATERAL VENOUS CLINICAL HISTORY: BILATERAL LOWER EXTREMITY PAIN AND SWELLING. FINDINGS: Compression, augmentation, and color flow, is demonstrated bilaterally at the level of the common femoral veins, proximal, mid, and distal superficial femoral veins, and popliteal veins. Compression and color flow is identified bilaterally within the infrapopliteal venous vasculature. NO SONOGRAPHIC EVIDENCE, DEEP VEIN THROMBOSIS, BILATERAL LOWER EXTREMITIES. Lumbar MRI and cervical MRI report has not been uploaded however I reviewed the imaging of the lumbar MRI and showed scar of previous surgery, multilevel degenerative changes, severe disc narrowing at the L2-3 and L3-4, posterior disc bulging at the L5-S1 which seem to be residual from her previous surgery as well as probably she has mild to moderate foraminal stenosis and scar from previous surgery no acute infectious findings. Cervical MRI also noted for multilevel degenerative changes, she has posterior bulging at C4- 5 and C5-6 probably effacing the anterior cord but no signs of myelomalacia or cord compression,    Pending final report. ASSESSMENT & PLAN  Active Hospital Problems    Diagnosis Date Noted    Inflammation of left sacroiliac joint ([facility] [M46.1] 03/21/2021    Piriformis syndrome of left side [G57.02] 03/21/2021    Acute back pain with sciatica, left [M54.42] 03/21/2021    Intervertebral disc stenosis of neural canal of lumbar region [M99.53] 03/21/2021    Postlaminectomy syndrome, lumbar region [M96.1] 03/21/2021    Left leg weakness [R29.898] 03/19/2021     74years old female slightly overweight, patient presented with acute exacerbation of progressive left leg pain and numbness, initial concern was about neurological stroke event however ruled out with brain imaging is negative also no evidence of blood clot. Further work-up with cervical lumbar MRI showed generalized degenerative changes with multilevel disc herniation cervically at level indicated above also probably she has multilevel degenerative changes with spinal stenosis in the lumbar region however she has only numbness on exam, no weakness, no foot drop, no issues with bowel control.     Patient is only on Tylenol, appears to she is able to discharged if cleared medically also after getting the final report of the MRI cervical and lumbar for review.     SIGNATURE: Merrill Trevino MD PATIENT NAME: Jaqui Cedeno   DATE: March 21, 2021 MRN: [id number]   TIME: 1:22 PM PAGER/CONTACT #: [phone]

## 2021-03-24 ENCOUNTER — TELEPHONE (OUTPATIENT)
Dept: CARDIOLOGY CLINIC | Age: 66
End: 2021-03-24

## 2021-03-24 DIAGNOSIS — E78.5 DYSLIPIDEMIA: Primary | ICD-10-CM

## 2021-03-24 RX ORDER — SPIRONOLACTONE 25 MG/1
25 TABLET ORAL DAILY
Qty: 90 TABLET | Refills: 1 | Status: SHIPPED | OUTPATIENT
Start: 2021-03-24 | End: 2021-09-24 | Stop reason: SDUPTHER

## 2021-03-24 NOTE — TELEPHONE ENCOUNTER
Patient states she was told at her last appointment that winnie lucas be on a new water pill. No Rx and patient doen't know the name of the pill. Please advise.

## 2021-03-30 ENCOUNTER — OFFICE VISIT (OUTPATIENT)
Dept: PRIMARY CARE CLINIC | Age: 66
End: 2021-03-30
Payer: MEDICARE

## 2021-03-30 ENCOUNTER — TELEPHONE (OUTPATIENT)
Dept: PRIMARY CARE CLINIC | Age: 66
End: 2021-03-30

## 2021-03-30 VITALS
WEIGHT: 293 LBS | OXYGEN SATURATION: 98 % | DIASTOLIC BLOOD PRESSURE: 92 MMHG | RESPIRATION RATE: 16 BRPM | BODY MASS INDEX: 47.09 KG/M2 | HEIGHT: 66 IN | HEART RATE: 97 BPM | SYSTOLIC BLOOD PRESSURE: 132 MMHG

## 2021-03-30 DIAGNOSIS — M99.53 INTERVERTEBRAL DISC STENOSIS OF NEURAL CANAL OF LUMBAR REGION: ICD-10-CM

## 2021-03-30 DIAGNOSIS — Z00.00 PREVENTATIVE HEALTH CARE: ICD-10-CM

## 2021-03-30 DIAGNOSIS — Z12.31 ENCOUNTER FOR SCREENING MAMMOGRAM FOR BREAST CANCER: ICD-10-CM

## 2021-03-30 DIAGNOSIS — E11.9 TYPE 2 DIABETES MELLITUS WITHOUT COMPLICATION, WITHOUT LONG-TERM CURRENT USE OF INSULIN (HCC): Primary | ICD-10-CM

## 2021-03-30 DIAGNOSIS — Z78.0 POST-MENOPAUSAL: ICD-10-CM

## 2021-03-30 DIAGNOSIS — Z12.11 SCREEN FOR COLON CANCER: ICD-10-CM

## 2021-03-30 DIAGNOSIS — N28.1 RENAL CYST: ICD-10-CM

## 2021-03-30 LAB
CREATININE URINE: 30.1 MG/DL
MICROALBUMIN UR-MCNC: <1.2 MG/DL
MICROALBUMIN/CREAT UR-RTO: NORMAL MG/G (ref 0–30)

## 2021-03-30 PROCEDURE — 99204 OFFICE O/P NEW MOD 45 MIN: CPT | Performed by: INTERNAL MEDICINE

## 2021-03-30 PROCEDURE — 3051F HG A1C>EQUAL 7.0%<8.0%: CPT | Performed by: INTERNAL MEDICINE

## 2021-03-30 RX ORDER — POTASSIUM CHLORIDE 20 MEQ/1
TABLET, EXTENDED RELEASE ORAL
COMMUNITY
End: 2021-03-30

## 2021-03-30 RX ORDER — LANCETS 30 GAUGE
EACH MISCELLANEOUS
Qty: 300 EACH | Refills: 5 | Status: SHIPPED | OUTPATIENT
Start: 2021-03-30 | End: 2022-05-09

## 2021-03-30 RX ORDER — GLUCOSAMINE HCL/CHONDROITIN SU 500-400 MG
CAPSULE ORAL
Qty: 100 STRIP | Refills: 5 | Status: SHIPPED | OUTPATIENT
Start: 2021-03-30 | End: 2021-12-10 | Stop reason: SDUPTHER

## 2021-03-30 RX ORDER — POTASSIUM CHLORIDE 20 MEQ/1
TABLET, EXTENDED RELEASE ORAL
COMMUNITY
Start: 2021-03-16 | End: 2021-03-30

## 2021-03-30 RX ORDER — ACETAMINOPHEN 500 MG
TABLET ORAL
COMMUNITY

## 2021-03-30 RX ORDER — EMPAGLIFLOZIN 10 MG/1
1 TABLET, FILM COATED ORAL DAILY
Qty: 90 TABLET | Refills: 1 | Status: SHIPPED | OUTPATIENT
Start: 2021-03-30 | End: 2021-04-15 | Stop reason: SDUPTHER

## 2021-03-30 ASSESSMENT — PATIENT HEALTH QUESTIONNAIRE - PHQ9
SUM OF ALL RESPONSES TO PHQ QUESTIONS 1-9: 2
1. LITTLE INTEREST OR PLEASURE IN DOING THINGS: 1
SUM OF ALL RESPONSES TO PHQ QUESTIONS 1-9: 2
SUM OF ALL RESPONSES TO PHQ9 QUESTIONS 1 & 2: 2
SUM OF ALL RESPONSES TO PHQ QUESTIONS 1-9: 2
2. FEELING DOWN, DEPRESSED OR HOPELESS: 1

## 2021-03-30 ASSESSMENT — ENCOUNTER SYMPTOMS
NAUSEA: 0
SHORTNESS OF BREATH: 0
DIARRHEA: 0
ABDOMINAL PAIN: 0
VOMITING: 0
WHEEZING: 0
COUGH: 0

## 2021-03-30 NOTE — PROGRESS NOTES
.newpatSubjective:      Patient ID: Jaime Funes is a 74 y.o. female    New pt to establish care. HPI   Patient presents to establish care with PCP today. Previous PCP was in [facility]. PMHx: COPD, T2DM, lumbar spondylosis with spinal stenosis. Home O2 3-5L  Last pap test: at least 5 yrs ago. Result: claims negative   Last colonoscopy: 2017. Result:10 polyps. Last mammogram: unsure. Result: claims negative     DEXA: never   Hx zoster vaccination? never  10-year ASCVD risk: no lipid panel      CC: wants to establish care with PCP. Recently admitted for leg weakness in [id number] [address]. CVA ruled out. Lumbar MRI showed degen disc with spinal stenosis. Renal cysts seen. Seen by cardio. EKG NSR, echo pending. Past Medical History:   Diagnosis Date    Asthma     COPD [facility]chronic obstructive pulmonary disease) ([facility] [address] [facility]     Depression     Diabetes mellitus ([facility] [address]      Past Surgical History:   Procedure Laterality Date    CHOLECYSTECTOMY       Social History     Socioeconomic History    Marital status:       Spouse name: Not on file    Number of children: Not on file    Years of education: Not on file    Highest education level: Not on file   Occupational History    Not on file   Social Needs    Financial resource strain: Not on file    Food insecurity     Worry: Not on file     Inability: Not on file    Transportation needs     Medical: Not on file     Non-medical: Not on file   Tobacco Use    Smoking status: Former Smoker     Types: Cigarettes    Smokeless tobacco: Never Used   Substance and Sexual Activity    Alcohol use: Not Currently     Comment: occass    Drug use: Never    Sexual activity: Not on file   Lifestyle    Physical activity     Days per week: Not on file     Minutes per session: Not on file    Stress: Not on file   Relationships    Social connections     Talks on phone: Not on file     Gets together: Not on file     Attends Mu-ism service: Not on file     Active member of club or organization: Not on file     Attends meetings of clubs or organizations: Not on file     Relationship status: Not on file    Intimate partner violence     Fear of current or ex partner: Not on file     Emotionally abused: Not on file     Physically abused: Not on file     Forced sexual activity: Not on file   Other Topics Concern    Not on file   Social History Narrative    Not on file     History reviewed. No pertinent family history. Allergies:  Budesonide-formoterol fumarate  Patient Active Problem List   Diagnosis    Left leg weakness    Inflammation of left sacroiliac joint (HCC)    Piriformis syndrome of left side    Acute back pain with sciatica, left    Intervertebral disc stenosis of neural canal of lumbar region    Postlaminectomy syndrome, lumbar region     Current Outpatient Medications on File Prior to Visit   Medication Sig Dispense Refill    acetaminophen (TYLENOL) 500 MG tablet       spironolactone (ALDACTONE) 25 MG tablet Take 1 tablet by mouth daily 90 tablet 1    gabapentin (NEURONTIN) 100 MG capsule Take 1 capsule by mouth 3 times daily for 30 days.  90 capsule 0    methocarbamol (ROBAXIN) 500 MG tablet Take 1 tablet by mouth 3 times daily as needed (muscle spasm) 30 tablet 1    albuterol (PROVENTIL) (2.5 MG/3ML) 0.083% nebulizer solution Take 2.5 mg by nebulization every 6 hours as needed for Wheezing      albuterol sulfate HFA (VENTOLIN HFA) 108 (90 Base) MCG/ACT inhaler Inhale 2 puffs into the lungs every 6 hours as needed for Wheezing      aclidinium (TUDORZA PRESSAIR) 400 MCG/ACT AEPB inhaler Inhale 1 puff into the lungs 2 times daily      budesonide-formoterol (SYMBICORT) 160-4.5 MCG/ACT AERO Inhale 2 puffs into the lungs 2 times daily      torsemide (DEMADEX) 20 MG tablet Take 20 mg by mouth daily      potassium chloride (KLOR-CON) 20 MEQ packet Take 20 mEq by mouth 2 times daily      DULoxetine (CYMBALTA) 60 MG extended release capsule Take 60 mg by mouth daily      aspirin 81 MG EC tablet Take 81 mg by mouth daily      metFORMIN (GLUCOPHAGE) 500 MG tablet Take 1,000 mg by mouth Daily with supper      montelukast (SINGULAIR) 10 MG tablet Take 10 mg by mouth nightly      vitamin D (CHOLECALCIFEROL) 25 MCG (1000 UT) TABS tablet Take 1,000 Units by mouth nightly      pravastatin (PRAVACHOL) 20 MG tablet Take 20 mg by mouth nightly       No current facility-administered medications on file prior to visit. Review of Systems   Constitutional: Negative for chills, diaphoresis, fatigue and fever. HENT: Negative for congestion, ear discharge and ear pain. Respiratory: Negative for cough, shortness of breath and wheezing. Cardiovascular: Negative for chest pain. Gastrointestinal: Negative for abdominal pain, diarrhea, nausea and vomiting. Endocrine: Negative for cold intolerance and heat intolerance. Genitourinary: Negative for dysuria and frequency. Neurological: Negative for dizziness and light-headedness. Psychiatric/Behavioral: Negative for dysphoric mood. The patient is not nervous/anxious. Objective:   BP (!) 132/92 (Site: Left Upper Arm, Position: Sitting, Cuff Size: Large Adult)   Pulse 97   Resp 16   Ht 5' 6\" (1.676 m)   Wt (!) 313 lb 9.6 oz (142.2 kg)   SpO2 98%   BMI 50.62 kg/m²     Physical Exam  Constitutional:       General: She is not in acute distress. Appearance: She is not diaphoretic. Cardiovascular:      Rate and Rhythm: Normal rate and regular rhythm. Pulses: Normal pulses. Heart sounds: Normal heart sounds, S1 normal and S2 normal. No murmur. Pulmonary:      Effort: Pulmonary effort is normal. No respiratory distress. Breath sounds: Normal breath sounds. No wheezing or rales. Chest:      Chest wall: No tenderness. Abdominal:      General: Bowel sounds are normal.      Tenderness: There is no abdominal tenderness. Neurological:      General: No focal deficit present. Mental Status: She is alert. Cranial Nerves: No cranial nerve deficit. Psychiatric:         Mood and Affect: Mood normal.         Thought Content: Thought content normal.       Assessment:       Diagnosis Orders   1. Type 2 diabetes mellitus without complication, without long-term current use of insulin ([facility])  empagliflozin (JARDIANCE) 10 MG tablet    blood glucose monitor kit and supplies    Lancets MISC    blood glucose monitor strips    will add another to metformin    2. Intervertebral disc stenosis of neural canal of lumbar region Improving     on meds   3. Renal cyst  CT ABDOMEN PELVIS W WO CONTRAST Additional Contrast? None   4. Post-menopausal  DEXA Bone Density Axial Skeleton   5. Encounter for screening mammogram for breast cancer  [facility] Digital Screen Bilateral [[id number]]   6. Preventative health care  Microalbumin / creatinine urine ratio    CBC With Auto Differential    Comprehensive Metabolic Panel    TSH with Reflex    Hepatitis C Antibody    Lipid Panel   7.  Screen for colon cancer  Gifty Dubon MD, Gastroenterology, [place]       Plan:      Orders Placed This Encounter   Procedures    [facility] Digital Screen Bilateral [[id number]]     Standing Status:   Future     Standing Expiration Date:   3/30/2022    DEXA Bone Density Axial Skeleton     Standing Status:   Future     Standing Expiration Date:   3/30/2022    CT ABDOMEN PELVIS W WO CONTRAST Additional Contrast? None     Standing Status:   Future     Standing Expiration Date:   3/30/2022     Order Specific Question:   Additional Contrast?     Answer:   None    Microalbumin / creatinine urine ratio     Standing Status:   Future     Number of Occurrences:   1     Standing Expiration Date:   3/30/2022    CBC With Auto Differential     Standing Status:   Future     Standing Expiration Date:   3/30/2022    Comprehensive Metabolic Panel     Standing Status:   Future     Standing Expiration Date:   3/30/2022    TSH with Reflex     Standing Status:   Future     Standing Expiration Date:   3/30/2022    Hepatitis C Antibody     Standing Status:   Future     Standing Expiration Date:   3/30/2022    Lipid Panel     Standing Status:   Future     Standing Expiration Date:   3/30/2022     Order Specific Question:   Is Patient Fasting?/# of Hours     Answer:   yes   Maurice Conley MD, Gastroenterology, [place]     Referral Priority:   Routine     Referral Type:   Eval and Treat     Referral Reason:   Specialty Services Required     Referred to Provider:   Juli Blankenship MD     Requested Specialty:   Gastroenterology     Number of Visits Requested:   1     Orders Placed This Encounter   Medications    empagliflozin (JARDIANCE) 10 MG tablet     Sig: Take 1 tablet by mouth daily     Dispense:  90 tablet     Refill:  1    blood glucose monitor kit and supplies     Sig: Dispense sufficient amount for indicated testing frequency plus additional to accommodate PRN testing needs. Dispense all needed supplies to include: monitor, strips, lancing device, lancets, control solutions, alcohol swabs. Dispense:  1 kit     Refill:  0     Brand per patient preference. May round up to next available package size.  Lancets MISC     Sig: Test fasting and after largest meal of the day     Dispense:  300 each     Refill:  5    blood glucose monitor strips     Sig: DX: diabetes mellitus. Use fasting and after largest meal of the day - Ok to substitute per insurance     Dispense:  100 strip     Refill:  5     Return in about 1 month (around 4/30/2021) for follow up, BP recheck, Medicare annual well visit.

## 2021-03-31 DIAGNOSIS — Z01.818 PRE-OP TESTING: Primary | ICD-10-CM

## 2021-04-01 ENCOUNTER — HOSPITAL ENCOUNTER (OUTPATIENT)
Dept: PHYSICAL THERAPY | Age: 66
Setting detail: THERAPIES SERIES
Discharge: HOME | End: 2021-04-01
Payer: MEDICARE

## 2021-04-01 PROCEDURE — 97110 THERAPEUTIC EXERCISES: CPT

## 2021-04-01 PROCEDURE — 97162 PT EVAL MOD COMPLEX 30 MIN: CPT

## 2021-04-01 ASSESSMENT — PAIN DESCRIPTION - ORIENTATION: ORIENTATION: LEFT;OUTER

## 2021-04-01 ASSESSMENT — PAIN DESCRIPTION - LOCATION: LOCATION: BUTTOCKS

## 2021-04-01 ASSESSMENT — PAIN SCALES - GENERAL: PAINLEVEL_OUTOF10: 3

## 2021-04-01 NOTE — PROGRESS NOTES
[address]  PHYSICAL THERAPY EVALUATION    Date: 2021  Patient Name: Antonietta Mitchell       MRN: [id number]   Account: [de-identified]   : 1955  (74 y.o.)   Gender: female   Referring Practitioner: Dr. Diane Gerardo                 Diagnosis: Left leg weakness, Lumbar radiculopathy, Acute back pain with LT sciatica, Myalgia  Treatment Diagnosis: LBP  Additional Pertinent Hx: OA, DM, COPD             Past Medical History:  has a past medical history of Asthma, COPD (chronic obstructive pulmonary disease) ([facility] [address] Depression, and Diabetes mellitus ([facility] [address] Past Surgical History:   has a past surgical history that includes Cholecystectomy. Vital Signs  Patient Currently in Pain: Yes   Pain Screening  Patient Currently in Pain: Yes  Pain Assessment  Pain Assessment: 0-10  Pain Level: 3  Pain Location: Buttocks  Pain Orientation: Left; Outer     Type of Home: House  Home Layout: One level  Home Access: Stairs to enter with rails  Entrance Stairs - Number of Steps: 4  Home Equipment: Oxygen(On 3L with portable O2, on 5L at home but doctor wants her to get down to 3)  ADL Assistance: [address] [place] [address] Avenue: Independent  Ambulation Assistance: Independent  Transfer Assistance: Independent  Active : Yes  Occupation: Retired        Subjective:  Subjective: Went to the hospital 3/19 due to outer Lt leg was numb and was in pain which then travelled into her Lt arm. Had a lot of testing which r/o CVA, blood clots, heart problems etc.  Was told it was due to sciatica. Has random episodes of no pain. Pain in the past week 0-7/10. Reports no back pain just pain from Lt buttock, down Lt lateral leg to foot. No falls in the past 6 months. Reports difficulty with walking and standing for long periods due to pain. Pain can also cause difficulty lying down - prefers to lie on LT side and can be uncomfortable.        Objective:   Sensation  Overall Sensation Status: WFL    Ambulation 1  Surface: carpet  Device: No Device  Assistance: Supervision, Independent  Gait Deviations: Slow Jessica, Increased JAMILAH, Decreased step length, Decreased step height  Distance: throughout clinic        Transfers  Sit to Stand: Independent  Stand to sit: Independent    Strength RLE  Strength RLE: WFL  Comment: Except hip abd 4/5, hip ext 3+/5  Strength LLE  Comment: Hip flex 4+/5, knee ext 4+/5, knee flex 5/5, DF 4/5, hip abd 4-/5, hip ext 3+/5    PROM RLE (degrees)  RLE General PROM: Decreased hamstrings flexibility     PROM LLE (degrees)  LLE General PROM: Decreased hamstrings flexibility, decreased hip ER    Spine  Lumbar: Flex 75%, Ext 25% with some pain, dung SB ~50%    Observation/Palpation  Palpation: Pelvis symmetrical, discomfort with palpation of Rt SI joint, pain along Lt piriformis  Bed mobility  Rolling to Left: Independent  Rolling to Right: Independent  Supine to Sit: Independent  Sit to Supine: Independent    Additional Measures  Special Tests: SLR dung to ~45deg with a stretch reported, (-) dung FADIR, pain along medial thigh with Rt ESMER, pain in buttock and Lt lateral hip with Lt ESMER      Exercises:   Exercises  Exercise 1: SKTC with towel 10s x2 dung  Exercise 2: TA kenya*  Exercise 3: TA kenya with march*  Exercise 4: Hooklying hip abd/add*  Exercise 5: Figure 4 str*  Exercise 6: Seated hams str 30s x1 dung  Exercise 7: SLR*  Exercise 8: Sidelying hip abd*  Exercise 9: Clams*  Exercise 10: Prone hip ext*  Exercise 11: Bridges*  Exercise 20: HEP: SKTC, hams str    Manual:  Manual therapy  Soft Tissue Mobalization: STM/tennis ball massage to Lt piriformis*  *Indicates exercise,modality, or manual techniques to be initiated when appropriate    Assessment:   Body structures, Functions, Activity limitations: Decreased functional mobility , Decreased ROM, Decreased strength, Increased pain  Assessment: Pt presents with recent onset of pain in Lt LB/buttock with pain that radiated into her Lt LE. Pt demonstrates decreased lumbar and Lt hip ER ROM with decreased flexibility in dung hamstrings. Pt demonstrates decreased dung LE strength likely contributing to her pain. Pt with tenderness along her piriformis on the LT likely pressing on her sciatic nerve causing symptoms to radiate down her leg. Pt would benefit from further skilled PT to improve her strength, ROM and pain to improve her ability to stand and ambualte for longer periods. Prognosis: Good  Discharge Recommendations: Continue to assess pending progress  Activity Tolerance: Patient Tolerated treatment well     Decision Making: Medium Complexity  History: PMH: OA, DM, COPD  Exam: Decreased ROM/flexibility and strength with increased pain impacting ADLs and mobility. Oswestry = 25/50  Clinical Presentation: Evolving        Plan  Frequency/Duration:  Plan  Times per week: 2  Plan weeks: 5  Current Treatment Recommendations: Strengthening, ROM, Functional Mobility Training, Neuromuscular Re-education, Manual Therapy - Soft Tissue Mobilization, Home Exercise Program, Safety Education & Training, Patient/Caregiver Education & Training, Equipment Evaluation, Education, & procurement, Modalities, Integrated [employer], Aquatics  Plan Comment: Transfer care to [facility]         Patient Education  New Education Provided: PT Education: Goals;PT Role;Plan of Care    POST-PAIN     Pain Rating (0-10 pain scale):   3/10  Location and pain description same as pre-treatment unless indicated. Action: [] NA  [] Call Physician  [] Perform HEP  [x] Meds as prescribed    Evaluation and patient rights have been reviewed and patient agrees with plan of care.   Yes  [x]  No  []   Explain:       [place]ison Fall Risk Assessment  Risk Factor Scale  Score   History of Falls [] Yes  [x] No 25  0 0   Secondary Diagnosis [] Yes  [x] No 15  0 0   Ambulatory Aid [] Furniture  [] Crutches/cane/walker  [x] None/bedrest/wheelchair/nurse 30  15  0 0   IV/Heparin Keron Pineda

## 2021-04-01 NOTE — PROGRESS NOTES
[address]     Ph: [phone]  Fax: [phone]    [x] Certification  [] Recertification []  Plan of Care  [] Progress Note [] Discharge      To:  Dr. Rolo Galaviz      From:  Estella Pitts, PT  Patient: Delena Mortimer     : 1955  Diagnosis: Left leg weakness, Lumbar radiculopathy, Acute back pain with LT sciatica, Myalgia     Date: 2021  Treatment Diagnosis: LBP    Plan of Care/Certification Expiration Date: 21  Progress Report Period from:  2021  to 2021    Total # of Visits to Date: 1   No Show: 0    Canceled Appointment: 0     OBJECTIVE:   Short Term Goals - Time Frame for Short term goals: 2 weeks    Goals Current/Discharge status  Met   Short term goal 1: Independent with HEP. ongoing [] yes  [] no     Long Term Goals - Time Frame for Long term goals : 5 weeks  Goals Current/ Discharge status Met   Long term goal 1: Improve lumbar ROM and dung LE flexibility to [facility] to increase ease with mobility and ADLs with decreased pain. PROM RLE (degrees)  RLE General PROM: Decreased hamstrings flexibility     PROM LLE (degrees)  LLE General PROM: Decreased hamstrings flexibility, decreased hip ER     Spine  Lumbar: Flex 75%, Ext 25% with some pain, dung SB ~50%   [] yes  [] no   Long term goal 2: Pt will report pain </= 3/10 with all activity including standing to wash dishes. 0-7/10 [] yes  [] no   Long term goal 3: Improve dung LE strength to >/= 4+/5 to improve stability with standing and walking. Strength RLE  Strength RLE: WFL  Comment: Except hip abd 4/5, hip ext 3+/5  Strength LLE  Comment: Hip flex 4+/5, knee ext 4+/5, knee flex 5/5, DF 4/5, hip abd 4-/5, hip ext 3+/5   [] yes  [] no   Long term goal 4: Oswestry </= 15/50 to improve pt's QOL.  25/50 [] yes  [] no       Body structures, Functions, Activity limitations: Decreased functional mobility , Decreased ROM, Decreased strength, Increased pain  Assessment: Pt presents with recent onset of pain in Lt LB/buttock with pain that radiated into her Lt LE. Pt demonstrates decreased lumbar and Lt hip ER ROM with decreased flexibility in dung hamstrings. Pt demonstrates decreased dung LE strength likely contributing to her pain. Pt with tenderness along her piriformis on the LT likely pressing on her sciatic nerve causing symptoms to radiate down her leg. Pt would benefit from further skilled PT to improve her strength, ROM and pain to improve her ability to stand and ambualte for longer periods. Prognosis: Good  Discharge Recommendations: Continue to assess pending progress      PT Education: Goals;PT Role;Plan of Care    PLAN: [x] Evaluate and Treat  Frequency/Duration:  Plan  Times per week: 2  Plan weeks: 5  Current Treatment Recommendations: Strengthening, ROM, Functional Mobility Training, Neuromuscular Re-education, Manual Therapy - Soft Tissue Mobilization, Home Exercise Program, Safety Education & Training, Patient/Caregiver Education & Training, Equipment Evaluation, Education, & procurement, Modalities, Integrated [employer], Aquatics  Plan Comment: Transfer care to [facility]     Precautions:     none                       Patient Status:[x] Continue/ Initiate plan of Care    [] Discharge PT. Recommend pt continue with HEP. [] Additional visits requested, Please re-certify for additional visits:          Signature: Electronically signed by Deyanira Correa PT on 4/1/21 at 12:39 PM EDT      If you have any questions or concerns, please don't hesitate to call. Thank you for your referral.    I have reviewed this plan of care and certify a need for medically necessary rehabilitation services.     Physician Signature:__________________________________________________________  Date:  Please sign and return

## 2021-04-07 ENCOUNTER — HOSPITAL ENCOUNTER (OUTPATIENT)
Dept: NON INVASIVE DIAGNOSTICS | Age: 66
Discharge: HOME | End: 2021-04-07
Payer: MEDICARE

## 2021-04-07 DIAGNOSIS — E78.5 DYSLIPIDEMIA: Primary | ICD-10-CM

## 2021-04-07 DIAGNOSIS — E78.1 HYPERTRIGLYCERIDEMIA: ICD-10-CM

## 2021-04-07 DIAGNOSIS — E78.5 DYSLIPIDEMIA: ICD-10-CM

## 2021-04-07 DIAGNOSIS — R79.89 LFT ELEVATION: ICD-10-CM

## 2021-04-07 DIAGNOSIS — D72.829 LEUKOCYTOSIS, UNSPECIFIED TYPE: ICD-10-CM

## 2021-04-07 DIAGNOSIS — R06.02 SHORTNESS OF BREATH: ICD-10-CM

## 2021-04-07 DIAGNOSIS — Z00.00 PREVENTATIVE HEALTH CARE: ICD-10-CM

## 2021-04-07 LAB
ALBUMIN SERPL-MCNC: 4 G/DL (ref 3.5–4.6)
ALP BLD-CCNC: 90 U/L (ref 40–130)
ALT SERPL-CCNC: 37 U/L (ref 0–33)
ANION GAP SERPL CALCULATED.3IONS-SCNC: 15 MEQ/L (ref 9–15)
ANION GAP SERPL CALCULATED.3IONS-SCNC: 16 MEQ/L (ref 9–15)
AST SERPL-CCNC: 42 U/L (ref 0–35)
BASOPHILS ABSOLUTE: 0.1 K/UL (ref 0–0.2)
BASOPHILS RELATIVE PERCENT: 1 %
BILIRUB SERPL-MCNC: 0.7 MG/DL (ref 0.2–0.7)
BUN BLDV-MCNC: 18 MG/DL (ref 8–23)
BUN BLDV-MCNC: 18 MG/DL (ref 8–23)
CALCIUM SERPL-MCNC: 8.8 MG/DL (ref 8.5–9.9)
CALCIUM SERPL-MCNC: 8.9 MG/DL (ref 8.5–9.9)
CHLORIDE BLD-SCNC: 98 MEQ/L (ref 95–107)
CHLORIDE BLD-SCNC: 98 MEQ/L (ref 95–107)
CHOLESTEROL, TOTAL: 183 MG/DL (ref 0–199)
CO2: 24 MEQ/L (ref 20–31)
CO2: 24 MEQ/L (ref 20–31)
CREAT SERPL-MCNC: 1.39 MG/DL (ref 0.5–0.9)
CREAT SERPL-MCNC: 1.4 MG/DL (ref 0.5–0.9)
EOSINOPHILS ABSOLUTE: 0.2 K/UL (ref 0–0.7)
EOSINOPHILS RELATIVE PERCENT: 1.4 %
GFR AFRICAN AMERICAN: 45.2
GFR AFRICAN AMERICAN: 45.6
GFR NON-AFRICAN AMERICAN: 37.4
GFR NON-AFRICAN AMERICAN: 37.7
GLOBULIN: 3 G/DL (ref 2.3–3.5)
GLUCOSE BLD-MCNC: 190 MG/DL (ref 70–99)
GLUCOSE BLD-MCNC: 192 MG/DL (ref 70–99)
HCT VFR BLD CALC: 39.7 % (ref 37–47)
HDLC SERPL-MCNC: 54 MG/DL (ref 40–59)
HEMOGLOBIN: 13.3 G/DL (ref 12–16)
HEPATITIS C ANTIBODY INTERPRETATION: NORMAL
LDL CHOLESTEROL CALCULATED: 94 MG/DL (ref 0–129)
LV EF: 60 %
LVEF MODALITY: NORMAL
LYMPHOCYTES ABSOLUTE: 1.5 K/UL (ref 1–4.8)
LYMPHOCYTES RELATIVE PERCENT: 12.1 %
MCH RBC QN AUTO: 30.6 PG (ref 27–31.3)
MCHC RBC AUTO-ENTMCNC: 33.5 % (ref 33–37)
MCV RBC AUTO: 91.3 FL (ref 82–100)
MONOCYTES ABSOLUTE: 0.5 K/UL (ref 0.2–0.8)
MONOCYTES RELATIVE PERCENT: 4.3 %
NEUTROPHILS ABSOLUTE: 9.8 K/UL (ref 1.4–6.5)
NEUTROPHILS RELATIVE PERCENT: 81.2 %
PDW BLD-RTO: 13.6 % (ref 11.5–14.5)
PLATELET # BLD: 451 K/UL (ref 130–400)
POTASSIUM SERPL-SCNC: 4 MEQ/L (ref 3.4–4.9)
POTASSIUM SERPL-SCNC: 4 MEQ/L (ref 3.4–4.9)
RBC # BLD: 4.34 M/UL (ref 4.2–5.4)
SODIUM BLD-SCNC: 137 MEQ/L (ref 135–144)
SODIUM BLD-SCNC: 138 MEQ/L (ref 135–144)
TOTAL PROTEIN: 7 G/DL (ref 6.3–8)
TRIGL SERPL-MCNC: 176 MG/DL (ref 0–150)
TSH REFLEX: 0.76 UIU/ML (ref 0.44–3.86)
WBC # BLD: 12.1 K/UL (ref 4.8–10.8)

## 2021-04-07 PROCEDURE — 93306 TTE W/DOPPLER COMPLETE: CPT

## 2021-04-07 RX ORDER — CHLORAL HYDRATE 500 MG
1000 CAPSULE ORAL 3 TIMES DAILY
Qty: 270 CAPSULE | Refills: 3 | Status: SHIPPED | OUTPATIENT
Start: 2021-04-07

## 2021-04-08 ENCOUNTER — HOSPITAL ENCOUNTER (OUTPATIENT)
Dept: PHYSICAL THERAPY | Age: 66
Setting detail: THERAPIES SERIES
Discharge: HOME | End: 2021-04-08
Payer: MEDICARE

## 2021-04-08 PROCEDURE — 97110 THERAPEUTIC EXERCISES: CPT

## 2021-04-08 ASSESSMENT — PAIN DESCRIPTION - LOCATION: LOCATION: BACK

## 2021-04-08 ASSESSMENT — PAIN DESCRIPTION - ORIENTATION: ORIENTATION: LOWER

## 2021-04-08 ASSESSMENT — PAIN SCALES - GENERAL: PAINLEVEL_OUTOF10: 2

## 2021-04-08 NOTE — PROGRESS NOTES
LBP  Prognosis: Good       Goals:  Short term goals  Time Frame for Short term goals: 2 weeks  Short term goal 1: Independent with HEP. Long term goals  Time Frame for Long term goals : 5 weeks  Long term goal 1: Improve lumbar ROM and dung LE flexibility to [facility] to increase ease with mobility and ADLs with decreased pain. Long term goal 2: Pt will report pain </= 3/10 with all activity including standing to wash dishes. Long term goal 3: Improve dung LE strength to >/= 4+/5 to improve stability with standing and walking. Long term goal 4: Oswestry </= 15/50 to improve pt's QOL. Progress toward goals:ongoing    POST-PAIN       Pain Rating (0-10 pain scale):  0 /10   Location and pain description same as pre-treatment unless indicated. Action: [x] NA   [] Perform HEP  [] Meds as prescribed  [] Modalities as prescribed   [] Call Physician     Frequency/Duration:  Plan  Times per week: 2  Plan weeks: 5  Current Treatment Recommendations: Strengthening, ROM, Functional Mobility Training, Neuromuscular Re-education, Manual Therapy - Soft Tissue Mobilization, Home Exercise Program, Safety Education & Training, Patient/Caregiver Education & Training, Equipment Evaluation, Education, & procurement, Modalities, Integrated Dry Needling, Aquatics  Plan Comment: Transfer care to [facility]     Pt to continue current HEP. See objective section for any therapeutic exercise changes, additions or modifications this date.          PT Individual Minutes  Time In: 5154  Time Out: 1147  Minutes: 38  Timed Code Treatment Minutes: 38 Minutes  Procedure Minutes:0     Timed Activity Minutes Units   Ther Ex 38 3       Signature:  Electronically signed by Dony Zapata PTA on 4/8/21 at 2:55 PM EDT
chest pain

## 2021-04-12 ENCOUNTER — NURSE ONLY (OUTPATIENT)
Dept: PRIMARY CARE CLINIC | Age: 66
End: 2021-04-12

## 2021-04-12 DIAGNOSIS — Z01.818 PRE-OP TESTING: ICD-10-CM

## 2021-04-13 ENCOUNTER — HOSPITAL ENCOUNTER (OUTPATIENT)
Dept: WOMENS IMAGING | Age: 66
Discharge: HOME | End: 2021-04-15
Payer: MEDICARE

## 2021-04-13 ENCOUNTER — HOSPITAL ENCOUNTER (OUTPATIENT)
Dept: CT IMAGING | Age: 66
Discharge: HOME | End: 2021-04-15
Payer: MEDICARE

## 2021-04-13 VITALS — HEART RATE: 85 BPM | DIASTOLIC BLOOD PRESSURE: 87 MMHG | SYSTOLIC BLOOD PRESSURE: 144 MMHG

## 2021-04-13 DIAGNOSIS — Z78.0 POST-MENOPAUSAL: ICD-10-CM

## 2021-04-13 DIAGNOSIS — N28.1 RENAL CYST: ICD-10-CM

## 2021-04-13 DIAGNOSIS — Z12.31 ENCOUNTER FOR SCREENING MAMMOGRAM FOR BREAST CANCER: ICD-10-CM

## 2021-04-13 PROCEDURE — 6360000004 HC RX CONTRAST MEDICATION: Performed by: INTERNAL MEDICINE

## 2021-04-13 PROCEDURE — 77080 DXA BONE DENSITY AXIAL: CPT

## 2021-04-13 PROCEDURE — 74178 CT ABD&PLV WO CNTR FLWD CNTR: CPT

## 2021-04-13 PROCEDURE — 77067 SCR MAMMO BI INCL CAD: CPT

## 2021-04-13 RX ADMIN — IOPAMIDOL 50 ML: 612 INJECTION, SOLUTION INTRAVENOUS at 11:06

## 2021-04-14 ENCOUNTER — OFFICE VISIT (OUTPATIENT)
Dept: CARDIOLOGY CLINIC | Age: 66
End: 2021-04-14
Payer: MEDICARE

## 2021-04-14 VITALS
DIASTOLIC BLOOD PRESSURE: 80 MMHG | OXYGEN SATURATION: 96 % | HEART RATE: 81 BPM | BODY MASS INDEX: 47.09 KG/M2 | WEIGHT: 293 LBS | HEIGHT: 66 IN | TEMPERATURE: 96.6 F | SYSTOLIC BLOOD PRESSURE: 120 MMHG

## 2021-04-14 DIAGNOSIS — Z76.89 ENCOUNTER TO ESTABLISH CARE: ICD-10-CM

## 2021-04-14 DIAGNOSIS — R06.02 SHORTNESS OF BREATH: ICD-10-CM

## 2021-04-14 DIAGNOSIS — I50.9 CONGESTIVE HEART FAILURE, UNSPECIFIED HF CHRONICITY, UNSPECIFIED HEART FAILURE TYPE (HCC): ICD-10-CM

## 2021-04-14 DIAGNOSIS — E78.5 DYSLIPIDEMIA: Primary | ICD-10-CM

## 2021-04-14 LAB
SARS-COV-2: NOT DETECTED
SOURCE: NORMAL

## 2021-04-14 PROCEDURE — 99213 OFFICE O/P EST LOW 20 MIN: CPT | Performed by: INTERNAL MEDICINE

## 2021-04-14 ASSESSMENT — ENCOUNTER SYMPTOMS
NAUSEA: 0
BLOOD IN STOOL: 0
WHEEZING: 0
VOICE CHANGE: 0
ABDOMINAL DISTENTION: 0
ANAL BLEEDING: 0
TROUBLE SWALLOWING: 0
VOMITING: 0
SHORTNESS OF BREATH: 0
CHEST TIGHTNESS: 0
APNEA: 0
FACIAL SWELLING: 0
COLOR CHANGE: 0

## 2021-04-14 NOTE — PROGRESS NOTES
[facility] CARDIOLOGY OFFICE FOLLOW-UP      Patient: Burnie Mcardle  YOB: 1955  MRN: [id number]    Chief Complaint:  Chief Complaint   Patient presents with    Results     Echocardiogram    Shortness of Breath         Subjective/HPI:  4/14/21: Patient presents today for shortness of breath. She has been set up with  in [facility] primary care and very happy with that option. Shee is diabetic. She is accompanied by her daughter. Has not gotten the Covid vaccine. She is a smoker. Has dyslipidemia and COPD. Echo showed preserved LV ejection fraction with surprisingly normal right to her systolic pressure. She needs to lose weight and reduce her calorie intake. Start a regular exercise regimen. See me in 3 months     3/17/21: Patient presents today for evaluation of shortness of breath. She sees Dr. Jordyn Moise. Reportedly was started on Medrol. Shortness of breath has not improved much. She is on a tapering dose. Currently on 20mg and then down to 10mg in a few days. Still morbidly obese. Accompanied by her daughter. She does not have a family physician. She is on a higher oxygen demand. Currently on 5 L. Has history of hypertension and also diabetes. Is on Metformin. Has dyslipidemia. She is accompanied by her daughter. Also has a history of GI issues. Will be set up with Dr. Brandon Barron. Dr. Db Omalleyt and Dr. Gema Vásquez in the pulmonary service. She wants to consolidate all her specialties and primary care in [facility].  We will get an echocardiogram to evaluate electrical systolic pressure and right ventricular function and then see me. Prescription for 10 mg of prednisone to continue for another month will be made.   Also has lower extremity edema which I think is due to venous insufficiency but will make therapeutic decision after echo is available       Past Medical History:   Diagnosis Date    Asthma     COPD (chronic obstructive pulmonary disease) ([address]     Depression     Diabetes mellitus ([facility]        Past Surgical History:   Procedure Laterality Date    CHOLECYSTECTOMY         Family History   Problem Relation Age of Onset    Breast Cancer Mother        Social History     Socioeconomic History    Marital status:      Spouse name: Not on file    Number of children: Not on file    Years of education: Not on file    Highest education level: Not on file   Occupational History    Not on file   Social Needs    Financial resource strain: Not on file    Food insecurity     Worry: Not on file     Inability: Not on file    Transportation needs     Medical: Not on file     Non-medical: Not on file   Tobacco Use    Smoking status: Former Smoker     Types: Cigarettes    Smokeless tobacco: Never Used   Substance and Sexual Activity    Alcohol use: Not Currently     Comment: occass    Drug use: Never    Sexual activity: Not on file   Lifestyle    Physical activity     Days per week: Not on file     Minutes per session: Not on file    Stress: Not on file   Relationships    Social connections     Talks on phone: Not on file     Gets together: Not on file     Attends Sabianism service: Not on file     Active member of club or organization: Not on file     Attends meetings of clubs or organizations: Not on file     Relationship status: Not on file    Intimate partner violence     Fear of current or ex partner: Not on file     Emotionally abused: Not on file     Physically abused: Not on file     Forced sexual activity: Not on file   Other Topics Concern    Not on file   Social History Narrative    Not on file       Allergies   Allergen Reactions    Budesonide-Formoterol Fumarate      Other reaction(s): THRUSH  Other reaction(s):  Other: See Comments  thrush       Current Outpatient Medications   Medication Sig Dispense Refill    Omega-3 Fatty Acids (FISH OIL) 1000 MG CAPS Take 1 capsule by mouth 3 times daily 270 capsule 3    acetaminophen (TYLENOL) 500 MG tablet       blood glucose monitor kit and supplies Dispense sufficient amount for indicated testing frequency plus additional to accommodate PRN testing needs. Dispense all needed supplies to include: monitor, strips, lancing device, lancets, control solutions, alcohol swabs. 1 kit 0    Lancets MISC Test fasting and after largest meal of the day 300 each 5    blood glucose monitor strips DX: diabetes mellitus. Use fasting and after largest meal of the day - Ok to substitute per insurance 100 strip 5    spironolactone (ALDACTONE) 25 MG tablet Take 1 tablet by mouth daily 90 tablet 1    gabapentin (NEURONTIN) 100 MG capsule Take 1 capsule by mouth 3 times daily for 30 days. 90 capsule 0    albuterol (PROVENTIL) (2.5 MG/3ML) 0.083% nebulizer solution Take 2.5 mg by nebulization every 6 hours as needed for Wheezing      albuterol sulfate HFA (VENTOLIN HFA) 108 (90 Base) MCG/ACT inhaler Inhale 2 puffs into the lungs every 6 hours as needed for Wheezing      aclidinium (TUDORZA PRESSAIR) 400 MCG/ACT AEPB inhaler Inhale 1 puff into the lungs 2 times daily      budesonide-formoterol (SYMBICORT) 160-4.5 MCG/ACT AERO Inhale 2 puffs into the lungs 2 times daily      torsemide (DEMADEX) 20 MG tablet Take 20 mg by mouth daily      potassium chloride (KLOR-CON) 20 MEQ packet Take 20 mEq by mouth 2 times daily      DULoxetine (CYMBALTA) 60 MG extended release capsule Take 60 mg by mouth daily      aspirin 81 MG EC tablet Take 81 mg by mouth daily      metFORMIN (GLUCOPHAGE) 500 MG tablet Take 1,000 mg by mouth Daily with supper      montelukast (SINGULAIR) 10 MG tablet Take 10 mg by mouth nightly      pravastatin (PRAVACHOL) 20 MG tablet Take 20 mg by mouth nightly      vitamin D (CHOLECALCIFEROL) 25 MCG (1000 UT) TABS tablet Take 1,000 Units by mouth nightly      empagliflozin (JARDIANCE) 10 MG tablet Take 1 tablet by mouth daily 90 tablet 1     No current facility-administered medications for this visit.         Review of Systems:   Review of Systems   Constitutional: Negative for activity change, appetite change, diaphoresis, fatigue and unexpected weight change. HENT: Negative for facial swelling, nosebleeds, trouble swallowing and voice change. Respiratory: Negative for apnea, chest tightness, shortness of breath and wheezing. Cardiovascular: Negative for chest pain, palpitations and leg swelling. Gastrointestinal: Negative for abdominal distention, anal bleeding, blood in stool, nausea and vomiting. Genitourinary: Negative for decreased urine volume and dysuria. Musculoskeletal: Negative for gait problem and myalgias. Skin: Negative. Negative for color change, pallor, rash and wound. Neurological: Negative for dizziness, syncope, facial asymmetry, weakness, light-headedness, numbness and headaches. Hematological: Does not bruise/bleed easily. Psychiatric/Behavioral: Negative for agitation, behavioral problems, confusion, decreased concentration, hallucinations and suicidal ideas. The patient is not nervous/anxious and is not hyperactive. All other systems reviewed and are negative. Review of System is negative except for as mentioned above. Physical Examination:    /80 (Site: Left Upper Arm, Position: Sitting, Cuff Size: Large Adult)   Pulse 81   Temp 96.6 °F (35.9 °C) (Infrared)   Ht 5' 6\" (1.676 m)   Wt (!) 314 lb (142.4 kg)   SpO2 96%   BMI 50.68 kg/m²    Physical Exam   Constitutional: She appears healthy. No distress. HENT:   Nose: Nose normal.   Mouth/Throat: Dentition is normal. Oropharynx is clear. Eyes: Pupils are equal, round, and reactive to light. Conjunctivae are normal.   Neck: Normal range of motion and thyroid normal. Neck supple. Cardiovascular: Regular rhythm, S1 normal, S2 normal, normal heart sounds, intact distal pulses and normal pulses. PMI is not displaced. No murmur heard. Pulmonary/Chest: She has no wheezes. She has no rales.  She exhibits no tenderness. Abdominal: Soft. Bowel sounds are normal. She exhibits no distension and no mass. There is no splenomegaly or hepatomegaly. There is no abdominal tenderness. No hernia. Neurological: She is alert and oriented to person, place, and time. She has normal motor skills. Gait normal.   Skin: Skin is warm and dry. No cyanosis. No jaundice. Nails show no clubbing. Patient Active Problem List   Diagnosis    Left leg weakness    Inflammation of left sacroiliac joint (HCC)    Piriformis syndrome of left side    Acute back pain with sciatica, left    Intervertebral disc stenosis of neural canal of lumbar region    Postlaminectomy syndrome, lumbar region           No orders of the defined types were placed in this encounter. No orders of the defined types were placed in this encounter. Assessment/Orders:       ICD-10-CM    1. Dyslipidemia  E78.5    2. Encounter to establish care  Z76.89    3. Shortness of breath  R06.02    4. Congestive heart failure, unspecified HF chronicity, unspecified heart failure type ([facility]  I50.9        No orders of the defined types were placed in this encounter. There are no discontinued medications. No orders of the defined types were placed in this encounter. Plan:    Stay on same medications.     See me in 3 months        Electronically signed by: Leeann Bentely MD  4/15/2021 4:11 PM

## 2021-04-15 ENCOUNTER — HOSPITAL ENCOUNTER (OUTPATIENT)
Dept: PHYSICAL THERAPY | Age: 66
Setting detail: THERAPIES SERIES
Discharge: HOME | End: 2021-04-15
Payer: MEDICARE

## 2021-04-15 DIAGNOSIS — E11.9 TYPE 2 DIABETES MELLITUS WITHOUT COMPLICATION, WITHOUT LONG-TERM CURRENT USE OF INSULIN (HCC): ICD-10-CM

## 2021-04-15 PROCEDURE — 97110 THERAPEUTIC EXERCISES: CPT

## 2021-04-15 RX ORDER — EMPAGLIFLOZIN 10 MG/1
1 TABLET, FILM COATED ORAL DAILY
Qty: 90 TABLET | Refills: 1 | Status: SHIPPED
Start: 2021-04-15 | End: 2021-04-27 | Stop reason: DRUGHIGH

## 2021-04-15 NOTE — PROGRESS NOTES
[address] [address]  Outpatient Physical Therapy    Treatment Note        Date: 4/15/2021  Patient: Hermann Reed  : 1955  ACCT #: [de-identified]  Referring Practitioner: Dr. Pathak  Diagnosis: Left leg weakness, Lumbar radiculopathy, Acute back pain with LT sciatica, Myalgia    Visit Information:  PT Visit Information  PT Insurance Information: [facility] AND [facility] Medicare  Total # of Visits Approved: ($40 copay)  Total # of Visits to Date: 3  Plan of Care/Certification Expiration Date: 21  No Show: 0  Canceled Appointment: 0  Progress Note Counter: 3/10    Subjective: Pt arrives today w/o pain. Notes leg pain has been absent for ~1 week, and LBP is off and on. Pt would like to work on getting down to her knees to be able to garden. Comments: Pt donning 3L O2 via NC with portable tank; pt looking into purchasing a cart to pull O2 tank  HEP Compliance:  [] Good [] Fair [] Poor [] Reports not doing due to:    Vital Signs  Patient Currently in Pain: Denies   Pain Screening  Patient Currently in Pain: Denies    OBJECTIVE:   Exercises  Exercise 7: SLR x 10, b/l  Exercise 11: Bridges x 10  Exercise 13: STS from low table: 2x10  Exercise 14:  training (kneeling for gardening): x 5 dung (UE assist required)  Exercise 15: 4\" box steps: fwd and lateral x 10 ea    Strength: [x] NT  [] MMT completed:    ROM: [x] NT  [] ROM measurements:     *Indicates exercise, modality, or manual techniques to be initiated when appropriate    Assessment: Body structures, Functions, Activity limitations: Decreased functional mobility , Decreased ROM, Decreased strength, Increased pain  Assessment: Pt without leg or LBP at arrival therefore session continued to focus on functional strength and body mechanics to allow pt to perform pain free gardening and floor transfers. UE support required for floor transfers and returning to standing d/t LE weakness. Good technique displayed with cues provided.  Pt encouraged to remain active at home. Will continue to monitor LBP/radicular symptoms while progressing HEP and activity to pt tolerance. Treatment Diagnosis: LBP  Prognosis: Good       Goals:  Short term goals  Time Frame for Short term goals: 2 weeks  Short term goal 1: Independent with HEP. Long term goals  Time Frame for Long term goals : 5 weeks  Long term goal 1: Improve lumbar ROM and dung LE flexibility to [facility] to increase ease with mobility and ADLs with decreased pain. Long term goal 2: Pt will report pain </= 3/10 with all activity including standing to wash dishes. Long term goal 3: Improve dung LE strength to >/= 4+/5 to improve stability with standing and walking. Long term goal 4: Oswestry </= 15/50 to improve pt's QOL. Progress toward goals: cont toward all     POST-PAIN       Pain Rating (0-10 pain scale):  0 /10   Location and pain description same as pre-treatment unless indicated. Action: [] NA   [x] Perform HEP  [] Meds as prescribed  [] Modalities as prescribed   [] Call Physician     Frequency/Duration:  Plan  Times per week: 2  Plan weeks: 5  Current Treatment Recommendations: Strengthening, ROM, Functional Mobility Training, Neuromuscular Re-education, Manual Therapy - Soft Tissue Mobilization, Home Exercise Program, Safety Education & Training, Patient/Caregiver Education & Training, Equipment Evaluation, Education, & procurement, Modalities, Integrated Dry Needling, Aquatics  Plan Comment: Transfer care to Zachery Pablo PT     Pt to continue current HEP. See objective section for any therapeutic exercise changes, additions or modifications this date.        PT Individual Minutes  Time In: 1003  Time Out: 7468  Minutes: 39  Timed Code Treatment Minutes: 39 Minutes  Procedure Minutes: 0     Timed Activity Minutes Units   Ther Ex 39 3       Signature:  Electronically signed by Zachery Pablo PT on 4/15/21 at 4:46 PM EDT

## 2021-04-19 ENCOUNTER — HOSPITAL ENCOUNTER (OUTPATIENT)
Age: 66
Setting detail: OUTPATIENT SURGERY
Discharge: HOME | End: 2021-04-19
Attending: SPECIALIST | Admitting: SPECIALIST
Payer: MEDICARE

## 2021-04-19 ENCOUNTER — ANESTHESIA (OUTPATIENT)
Dept: ENDOSCOPY | Age: 66
End: 2021-04-19
Payer: MEDICARE

## 2021-04-19 ENCOUNTER — ANCILLARY PROCEDURE (OUTPATIENT)
Dept: ENDOSCOPY | Age: 66
End: 2021-04-19
Attending: SPECIALIST
Payer: MEDICARE

## 2021-04-19 ENCOUNTER — ANESTHESIA EVENT (OUTPATIENT)
Dept: ENDOSCOPY | Age: 66
End: 2021-04-19
Payer: MEDICARE

## 2021-04-19 VITALS
DIASTOLIC BLOOD PRESSURE: 72 MMHG | SYSTOLIC BLOOD PRESSURE: 118 MMHG | HEIGHT: 66 IN | RESPIRATION RATE: 16 BRPM | WEIGHT: 293 LBS | BODY MASS INDEX: 47.09 KG/M2 | HEART RATE: 74 BPM | OXYGEN SATURATION: 98 % | TEMPERATURE: 97.2 F

## 2021-04-19 VITALS
DIASTOLIC BLOOD PRESSURE: 58 MMHG | SYSTOLIC BLOOD PRESSURE: 100 MMHG | OXYGEN SATURATION: 100 % | RESPIRATION RATE: 14 BRPM

## 2021-04-19 PROCEDURE — 3700000000 HC ANESTHESIA ATTENDED CARE: Performed by: SPECIALIST

## 2021-04-19 PROCEDURE — 6370000000 HC RX 637 (ALT 250 FOR IP): Performed by: SPECIALIST

## 2021-04-19 PROCEDURE — 45385 COLONOSCOPY W/LESION REMOVAL: CPT | Performed by: SPECIALIST

## 2021-04-19 PROCEDURE — 2709999900 HC NON-CHARGEABLE SUPPLY: Performed by: SPECIALIST

## 2021-04-19 PROCEDURE — 3609027000 HC COLONOSCOPY: Performed by: SPECIALIST

## 2021-04-19 PROCEDURE — 2580000003 HC RX 258: Performed by: SPECIALIST

## 2021-04-19 PROCEDURE — 88305 TISSUE EXAM BY PATHOLOGIST: CPT

## 2021-04-19 PROCEDURE — 2580000003 HC RX 258

## 2021-04-19 PROCEDURE — 45380 COLONOSCOPY AND BIOPSY: CPT | Performed by: SPECIALIST

## 2021-04-19 PROCEDURE — 2500000003 HC RX 250 WO HCPCS: Performed by: NURSE ANESTHETIST, CERTIFIED REGISTERED

## 2021-04-19 PROCEDURE — 3700000001 HC ADD 15 MINUTES (ANESTHESIA): Performed by: SPECIALIST

## 2021-04-19 PROCEDURE — 7100000010 HC PHASE II RECOVERY - FIRST 15 MIN: Performed by: SPECIALIST

## 2021-04-19 PROCEDURE — 7100000011 HC PHASE II RECOVERY - ADDTL 15 MIN: Performed by: SPECIALIST

## 2021-04-19 PROCEDURE — 6360000002 HC RX W HCPCS: Performed by: NURSE ANESTHETIST, CERTIFIED REGISTERED

## 2021-04-19 PROCEDURE — 2580000003 HC RX 258: Performed by: NURSE ANESTHETIST, CERTIFIED REGISTERED

## 2021-04-19 RX ORDER — CHOLECALCIFEROL (VITAMIN D3) 125 MCG
500 CAPSULE ORAL DAILY
COMMUNITY

## 2021-04-19 RX ORDER — SODIUM CHLORIDE 9 MG/ML
INJECTION, SOLUTION INTRAVENOUS
Status: COMPLETED
Start: 2021-04-19 | End: 2021-04-19

## 2021-04-19 RX ORDER — PROPOFOL 10 MG/ML
INJECTION, EMULSION INTRAVENOUS PRN
Status: DISCONTINUED | OUTPATIENT
Start: 2021-04-19 | End: 2021-04-19 | Stop reason: SDUPTHER

## 2021-04-19 RX ORDER — MAGNESIUM HYDROXIDE 1200 MG/15ML
LIQUID ORAL PRN
Status: DISCONTINUED | OUTPATIENT
Start: 2021-04-19 | End: 2021-04-19 | Stop reason: ALTCHOICE

## 2021-04-19 RX ORDER — PROPOFOL 10 MG/ML
INJECTION, EMULSION INTRAVENOUS CONTINUOUS PRN
Status: DISCONTINUED | OUTPATIENT
Start: 2021-04-19 | End: 2021-04-19 | Stop reason: SDUPTHER

## 2021-04-19 RX ORDER — 0.9 % SODIUM CHLORIDE 0.9 %
500 INTRAVENOUS SOLUTION INTRAVENOUS ONCE
Status: COMPLETED | OUTPATIENT
Start: 2021-04-19 | End: 2021-04-19

## 2021-04-19 RX ORDER — SODIUM CHLORIDE 9 MG/ML
INJECTION, SOLUTION INTRAVENOUS CONTINUOUS PRN
Status: DISCONTINUED | OUTPATIENT
Start: 2021-04-19 | End: 2021-04-19 | Stop reason: SDUPTHER

## 2021-04-19 RX ORDER — SIMETHICONE 20 MG/.3ML
EMULSION ORAL PRN
Status: DISCONTINUED | OUTPATIENT
Start: 2021-04-19 | End: 2021-04-19 | Stop reason: ALTCHOICE

## 2021-04-19 RX ORDER — LIDOCAINE HYDROCHLORIDE 20 MG/ML
INJECTION, SOLUTION INFILTRATION; PERINEURAL PRN
Status: DISCONTINUED | OUTPATIENT
Start: 2021-04-19 | End: 2021-04-19 | Stop reason: SDUPTHER

## 2021-04-19 RX ORDER — DULOXETIN HYDROCHLORIDE 60 MG/1
60 CAPSULE, DELAYED RELEASE ORAL DAILY
COMMUNITY
End: 2021-04-27 | Stop reason: ALTCHOICE

## 2021-04-19 RX ADMIN — PROPOFOL 50 MG: 10 INJECTION, EMULSION INTRAVENOUS at 10:11

## 2021-04-19 RX ADMIN — PROPOFOL 100 MG: 10 INJECTION, EMULSION INTRAVENOUS at 10:10

## 2021-04-19 RX ADMIN — SODIUM CHLORIDE: 9 INJECTION, SOLUTION INTRAVENOUS at 08:54

## 2021-04-19 RX ADMIN — PROPOFOL 50 MG: 10 INJECTION, EMULSION INTRAVENOUS at 10:12

## 2021-04-19 RX ADMIN — PROPOFOL 125 MCG/KG/MIN: 10 INJECTION, EMULSION INTRAVENOUS at 10:13

## 2021-04-19 RX ADMIN — SODIUM CHLORIDE 500 ML: 9 INJECTION, SOLUTION INTRAVENOUS at 09:10

## 2021-04-19 RX ADMIN — LIDOCAINE HYDROCHLORIDE 60 MG: 20 INJECTION, SOLUTION INFILTRATION; PERINEURAL at 10:11

## 2021-04-19 RX ADMIN — Medication 500 ML: at 09:10

## 2021-04-19 ASSESSMENT — COPD QUESTIONNAIRES: CAT_SEVERITY: NO INTERVAL CHANGE

## 2021-04-19 ASSESSMENT — PAIN - FUNCTIONAL ASSESSMENT: PAIN_FUNCTIONAL_ASSESSMENT: 0-10

## 2021-04-19 ASSESSMENT — LIFESTYLE VARIABLES: SMOKING_STATUS: 0

## 2021-04-19 NOTE — ANESTHESIA POSTPROCEDURE EVALUATION
Department of Anesthesiology  Postprocedure Note    Patient: Megan Guadarrama  MRN: [id number]  [place]: 1955  Date of evaluation: 4/19/2021  Time:  10:49 AM     Procedure Summary     Date: 04/19/21 Room / Location: [facility] OR 01 / [facility]    Anesthesia Start: 1003 Anesthesia Stop:     Procedure: [address] with polypectomies (N/A ) Diagnosis: (History of colon polyps Z86.010)    Surgeons: Terence Singleton MD Responsible Provider: DONNIE Henry CRNA    Anesthesia Type: MAC ASA Status: 3          Anesthesia Type: MAC    Radu Phase I: Radu Score: 9    Radu Phase II:      Last vitals: Reviewed and per EMR flowsheets.        Anesthesia Post Evaluation    Patient location during evaluation: PACU  Patient participation: complete - patient participated  Level of consciousness: awake and sleepy but conscious  Pain score: 0  Airway patency: patent  Nausea & Vomiting: no vomiting and no nausea  Complications: no  Cardiovascular status: hemodynamically stable  Respiratory status: acceptable  Hydration status: stable

## 2021-04-19 NOTE — H&P
Patient Name: Stef Fernandez  : 1955  MRN: [id number]  DATE: 21      ENDOSCOPY  History and Physical    Procedure:    [x] Diagnostic Colonoscopy       [] Screening Colonoscopy  [] EGD      [] ERCP      [] EUS       [] Other    [x] Previous office notes/History and Physical reviewed from the patients chart. Please see EMR for further details of HPI. I have examined the patient's status immediately prior to the procedure and:      Indications/HPI:    []Abdominal Pain  []Cancer- GI/Lung  []Fhx of colon CA/polyps  []History of Polyps  []Pizanos   []Melena  []Abnormal Imaging  []Dysphagia    []Persistent Pneumonia  []Anemia  []Food Impaction  [x]History of Polyps  []GI Bleed  []Pulmonary nodule/Mass  []Change in bowel habits []Heartburn/Reflux  []Rectal Bleed (BRBPR)  []Chest Pain - Non Cardiac []Heme (+) Stoo  l[]Ulcers  []Constipation  []Hemoptysis   []Varices  []Diarrhea  []Hypoxemia  []Nausea/Vomiting  []Screening   []Crohns/Colitis  []Other:   Anesthesia:   [x] MAC [] Moderate Sedation   [] General   [] None     ROS: 12 pt Review of Symptoms was negative unless mentioned above    Medications:   Prior to Admission medications    Medication Sig Start Date End Date Taking? Authorizing Provider   vitamin B-12 (CYANOCOBALAMIN) 500 MCG tablet Take 500 mcg by mouth daily   Yes Historical Provider, MD   DULoxetine (CYMBALTA) 60 MG extended release capsule Take 60 mg by mouth daily   Yes Historical Provider, MD   empagliflozin (JARDIANCE) 10 MG tablet Take 1 tablet by mouth daily 4/15/21  Yes Dominique Terry MD   Omega-3 Fatty Acids (FISH OIL) 1000 MG CAPS Take 1 capsule by mouth 3 times daily 21  Yes Dominique Teryr MD   blood glucose monitor kit and supplies Dispense sufficient amount for indicated testing frequency plus additional to accommodate PRN testing needs. Dispense all needed supplies to include: monitor, strips, lancing device, lancets, control solutions, alcohol swabs.  3/30/21  Yes Dominique Terry MD Lancets MISC Test fasting and after largest meal of the day 3/30/21  Yes Nasir Montoya MD   blood glucose monitor strips DX: diabetes mellitus. Use fasting and after largest meal of the day - Ok to substitute per insurance 3/30/21  Yes Nasir Montoya MD   spironolactone (ALDACTONE) 25 MG tablet Take 1 tablet by mouth daily 3/24/21  Yes Andrea Gupta MD   gabapentin (NEURONTIN) 100 MG capsule Take 1 capsule by mouth 3 times daily for 30 days.  3/21/21 4/20/21 Yes Irma Farrell MD   albuterol (PROVENTIL) (2.5 MG/3ML) 0.083% nebulizer solution Take 2.5 mg by nebulization every 6 hours as needed for Wheezing   Yes Historical Provider, MD   albuterol sulfate HFA (VENTOLIN HFA) 108 (90 Base) MCG/ACT inhaler Inhale 2 puffs into the lungs every 6 hours as needed for Wheezing   Yes Historical Provider, MD   aclidinium (TUDORZA PRESSAIR) 400 MCG/ACT AEPB inhaler Inhale 1 puff into the lungs 2 times daily   Yes Historical Provider, MD   budesonide-formoterol (SYMBICORT) 160-4.5 MCG/ACT AERO Inhale 2 puffs into the lungs 2 times daily   Yes Historical Provider, MD   torsemide (DEMADEX) 20 MG tablet Take 20 mg by mouth daily   Yes Historical Provider, MD   potassium chloride (KLOR-CON) 20 MEQ packet Take 20 mEq by mouth 2 times daily   Yes Historical Provider, MD   aspirin 81 MG EC tablet Take 81 mg by mouth daily   Yes Historical Provider, MD   metFORMIN (GLUCOPHAGE) 500 MG tablet Take 1,000 mg by mouth Daily with supper   Yes Historical Provider, MD   montelukast (SINGULAIR) 10 MG tablet Take 10 mg by mouth nightly   Yes Historical Provider, MD   pravastatin (PRAVACHOL) 20 MG tablet Take 20 mg by mouth nightly   Yes Historical Provider, MD   vitamin D (CHOLECALCIFEROL) 25 MCG (1000 UT) TABS tablet Take 1,000 Units by mouth nightly   Yes Historical Provider, MD   acetaminophen (TYLENOL) 500 MG tablet     Historical Provider, MD   DULoxetine (CYMBALTA) 60 MG extended release capsule Take 60 mg by mouth daily    Historical Provider, MD       Allergies: Allergies   Allergen Reactions    Budesonide-Formoterol Fumarate      Other reaction(s): THRUSH  Other reaction(s): Other: See Comments  thrush        History of allergic reaction to anesthesia:  No    Past Medical History:  Past Medical History:   Diagnosis Date    Asthma     [facility]     Depression     Diabetes mellitus ([facility]        Past Surgical History:  Past Surgical History:   Procedure Laterality Date    CHOLECYSTECTOMY         Social History:  Social History     Tobacco Use    Smoking status: Former Smoker     Types: Cigarettes    Smokeless tobacco: Never Used   Substance Use Topics    Alcohol use: Not Currently     Comment: occass    Drug use: Never       Vital Signs:   Vitals:    04/19/21 0850   BP: (!) 148/85   Pulse: 85   Resp: 20   Temp: 97.2 °F (36.2 °C)   SpO2: 100%        Physical Exam:  Cardiac:  [x]WNL  []Comments:  Pulmonary:  [x]WNL   []Comments:   Neuro/Mental Status:  [x]WNL  []Comments:  Abdominal:  [x]WNL    []Comments:  Other:   []WNL  []Comments:    Informed Consent:  The risks and benefits of the procedure have been discussed with either the patient or if they cannot consent, their representative. Assessment:  Patient examined and appropriate for planned sedation and procedure. Plan:  Proceed with planned sedation and procedure as above.     Henny Mejia MD  10:06 AM

## 2021-04-19 NOTE — ANESTHESIA PRE PROCEDURE
Provider, MD   aclidinium (TUDORZA PRESSAIR) 400 MCG/ACT AEPB inhaler Inhale 1 puff into the lungs 2 times daily   Yes Historical Provider, MD   budesonide-formoterol (SYMBICORT) 160-4.5 MCG/ACT AERO Inhale 2 puffs into the lungs 2 times daily   Yes Historical Provider, MD   torsemide (DEMADEX) 20 MG tablet Take 20 mg by mouth daily   Yes Historical Provider, MD   potassium chloride (KLOR-CON) 20 MEQ packet Take 20 mEq by mouth 2 times daily   Yes Historical Provider, MD   aspirin 81 MG EC tablet Take 81 mg by mouth daily   Yes Historical Provider, MD   metFORMIN (GLUCOPHAGE) 500 MG tablet Take 1,000 mg by mouth Daily with supper   Yes Historical Provider, MD   montelukast (SINGULAIR) 10 MG tablet Take 10 mg by mouth nightly   Yes Historical Provider, MD   pravastatin (PRAVACHOL) 20 MG tablet Take 20 mg by mouth nightly   Yes Historical Provider, MD   vitamin D (CHOLECALCIFEROL) 25 MCG (1000 UT) TABS tablet Take 1,000 Units by mouth nightly   Yes Historical Provider, MD   acetaminophen (TYLENOL) 500 MG tablet     Historical Provider, MD   DULoxetine (CYMBALTA) 60 MG extended release capsule Take 60 mg by mouth daily    Historical Provider, MD       Current medications:    Current Facility-Administered Medications   Medication Dose Route Frequency Provider Last Rate Last Admin    0.9 % sodium chloride bolus  500 mL Intravenous Once Oneil Lundborg,  mL/hr at 04/19/21 0910 500 mL at 04/19/21 0910    sterile water for irrigation    PRN Oneil Lundborg, MD   500 mL at 04/19/21 0855    simethicone (MYLICON) 40 AI/8.2TP drops    PRN Oneil Lundborg, MD   40 mg at 04/19/21 0856       Allergies: Allergies   Allergen Reactions    Budesonide-Formoterol Fumarate      Other reaction(s): THRUSH  Other reaction(s):  Other: See Comments  thrush       Problem List:    Patient Active Problem List   Diagnosis Code    Left leg weakness R29.898    Inflammation of left sacroiliac joint (HCC) M46.1    Piriformis syndrome of left side G57.02    Acute back pain with sciatica, left M54.42    Intervertebral disc stenosis of neural canal of lumbar region M99.53    Postlaminectomy syndrome, lumbar region M96.1       Past Medical History:        Diagnosis Date    Asthma     [facility] obstructive [facility] disease[facility]     Depression     Diabetes mellitus ([facility]        Past Surgical History:        Procedure Laterality Date    CHOLECYSTECTOMY         Social History:    Social History     Tobacco Use    Smoking status: Former Smoker     Types: Cigarettes    Smokeless tobacco: Never Used   Substance Use Topics    Alcohol use: Not Currently     Comment: occass                                Counseling given: Not Answered      Vital Signs (Current):   Vitals:    04/19/21 0850   BP: (!) 148/85   Pulse: 85   Resp: 20   Temp: 36.2 °C (97.2 °F)   TempSrc: Temporal   SpO2: 100%   Weight: (!) 314 lb (142.4 kg)   Height: 5' 6\" (1.676 m)                                              BP Readings from Last 3 Encounters:   04/19/21 (!) 148/85   04/13/21 (!) 144/87   04/14/21 120/80       NPO Status: Time of last liquid consumption: 0630                        Time of last solid consumption: 1700                        Date of last liquid consumption: 04/19/21                        Date of last solid food consumption: 04/16/21    BMI:   Wt Readings from Last 3 Encounters:   04/19/21 (!) 314 lb (142.4 kg)   04/14/21 (!) 314 lb (142.4 kg)   03/30/21 (!) 313 lb 9.6 oz (142.2 kg)     Body mass index is 50.68 kg/m².     CBC:   Lab Results   Component Value Date    WBC 12.1 04/07/2021    RBC 4.34 04/07/2021    HGB 13.3 04/07/2021    HCT 39.7 04/07/2021    MCV 91.3 04/07/2021    RDW 13.6 04/07/2021     04/07/2021       CMP:   Lab Results   Component Value Date     04/07/2021    K 4.0 04/07/2021    CL 98 04/07/2021    CO2 24 04/07/2021    BUN 18 04/07/2021    CREATININE 1.39 04/07/2021    GFRAA 45.6 04/07/2021    LABGLOM 37.7 04/07/2021 GLUCOSE 192 04/07/2021    PROT 7.0 04/07/2021    CALCIUM 8.8 04/07/2021    BILITOT 0.7 04/07/2021    ALKPHOS 90 04/07/2021    AST 42 04/07/2021    ALT 37 04/07/2021       POC Tests: No results for input(s): POCGLU, POCNA, POCK, POCCL, POCBUN, POCHEMO, POCHCT in the last 72 hours. Coags:   Lab Results   Component Value Date    PROTIME 12.7 03/19/2021    INR 0.9 03/19/2021    APTT 24.4 03/19/2021       HCG (If Applicable): No results found for: PREGTESTUR, PREGSERUM, HCG, HCGQUANT     ABGs: No results found for: PHART, PO2ART, PQF5HJX, ZPL4UIZ, BEART, V1KKTNTY     Type & Screen (If Applicable):  No results found for: LABABO, LABRH    Drug/Infectious Status (If Applicable):  No results found for: HIV, HEPCAB    COVID-19 Screening (If Applicable):   Lab Results   Component Value Date    COVID19 Not Detected 04/12/2021           Anesthesia Evaluation  Patient summary reviewed and Nursing notes reviewed no history of anesthetic complications:   Airway: Mallampati: III  TM distance: >3 FB   Neck ROM: full  Mouth opening: > = 3 FB Dental: normal exam         Pulmonary:normal exam    (+) COPD: no interval change,  asthma:     (-) not a current smoker                           Cardiovascular:Negative CV ROS  Exercise tolerance: no interval change,           Rhythm: regular  Rate: normal           Beta Blocker:  Not on Beta Blocker         Neuro/Psych:   (+) neuromuscular disease:, depression/anxiety             GI/Hepatic/Renal:   (+) morbid obesity          Endo/Other:    (+) DiabetesType II DM, no interval change, , : arthritis:., .                 Abdominal:           Vascular: negative vascular ROS. Anesthesia Plan      MAC     ASA 3       Induction: intravenous. Anesthetic plan and risks discussed with patient. Plan discussed with CRNA and attending.                   ODNNIE Larson - CRNA   4/19/2021

## 2021-04-21 ENCOUNTER — TELEPHONE (OUTPATIENT)
Dept: PRIMARY CARE CLINIC | Age: 66
End: 2021-04-21

## 2021-04-21 DIAGNOSIS — E78.1 HYPERTRIGLYCERIDEMIA: ICD-10-CM

## 2021-04-22 ENCOUNTER — HOSPITAL ENCOUNTER (OUTPATIENT)
Dept: PHYSICAL THERAPY | Age: 66
Setting detail: THERAPIES SERIES
Discharge: HOME | End: 2021-04-22
Payer: MEDICARE

## 2021-04-22 PROCEDURE — 97110 THERAPEUTIC EXERCISES: CPT

## 2021-04-22 NOTE — PROGRESS NOTES
schedule further appointments or D/C. Treatment Diagnosis: LBP  Prognosis: Good       Goals:  Short term goals  Time Frame for Short term goals: 2 weeks  Short term goal 1: Independent with HEP. Long term goals  Time Frame for Long term goals : 5 weeks  Long term goal 1: Improve lumbar ROM and dung LE flexibility to [facility] to increase ease with mobility and ADLs with decreased pain. Long term goal 2: Pt will report pain </= 3/10 with all activity including standing to wash dishes. Long term goal 3: Improve dung LE strength to >/= 4+/5 to improve stability with standing and walking. Long term goal 4: Oswestry </= 15/50 to improve pt's QOL. Progress toward goals:    POST-PAIN       Pain Rating (0-10 pain scale):  0 /10   Location and pain description same as pre-treatment unless indicated. Action: [] NA   [x] Perform HEP  [] Meds as prescribed  [] Modalities as prescribed   [] Call Physician     Frequency/Duration:  Plan  Times per week: 2  Plan weeks: 5  Current Treatment Recommendations: Strengthening, ROM, Functional Mobility Training, Neuromuscular Re-education, Manual Therapy - Soft Tissue Mobilization, Home Exercise Program, Safety Education & Training, Patient/Caregiver Education & Training, Equipment Evaluation, Education, & procurement, Modalities, Integrated Dry Needling, Aquatics  Plan Comment: Transfer care to [facility]     Pt to continue current HEP. See objective section for any therapeutic exercise changes, additions or modifications this date.        PT Individual Minutes  Time In: 1120  Time Out: 6226  Minutes: 42  Timed Code Treatment Minutes: 38 Minutes  Procedure Minutes: 0     Timed Activity Minutes Units   Ther Ex 38 3       Signature:  Electronically signed by Ever[facility], PT on 4/22/21 at 12:07 PM EDT

## 2021-04-27 ENCOUNTER — OFFICE VISIT (OUTPATIENT)
Dept: PRIMARY CARE CLINIC | Age: 66
End: 2021-04-27
Payer: MEDICARE

## 2021-04-27 VITALS
SYSTOLIC BLOOD PRESSURE: 120 MMHG | OXYGEN SATURATION: 96 % | WEIGHT: 293 LBS | RESPIRATION RATE: 16 BRPM | HEART RATE: 94 BPM | HEIGHT: 66 IN | DIASTOLIC BLOOD PRESSURE: 84 MMHG | BODY MASS INDEX: 47.09 KG/M2

## 2021-04-27 DIAGNOSIS — F43.21 ADJUSTMENT DISORDER WITH DEPRESSED MOOD: ICD-10-CM

## 2021-04-27 DIAGNOSIS — R79.89 LFT ELEVATION: ICD-10-CM

## 2021-04-27 DIAGNOSIS — D72.829 LEUKOCYTOSIS, UNSPECIFIED TYPE: ICD-10-CM

## 2021-04-27 DIAGNOSIS — E11.9 TYPE 2 DIABETES MELLITUS WITHOUT COMPLICATION, WITHOUT LONG-TERM CURRENT USE OF INSULIN (HCC): ICD-10-CM

## 2021-04-27 DIAGNOSIS — J44.9 CHRONIC OBSTRUCTIVE PULMONARY DISEASE, UNSPECIFIED COPD TYPE (HCC): ICD-10-CM

## 2021-04-27 DIAGNOSIS — R10.11 ABDOMINAL CRAMPING IN RIGHT UPPER QUADRANT: Primary | ICD-10-CM

## 2021-04-27 DIAGNOSIS — R60.0 PEDAL EDEMA: ICD-10-CM

## 2021-04-27 LAB
ALBUMIN SERPL-MCNC: 4.3 G/DL (ref 3.5–4.6)
ALP BLD-CCNC: 89 U/L (ref 40–130)
ALT SERPL-CCNC: 48 U/L (ref 0–33)
ANION GAP SERPL CALCULATED.3IONS-SCNC: 14 MEQ/L (ref 9–15)
AST SERPL-CCNC: 53 U/L (ref 0–35)
BASOPHILS ABSOLUTE: 0.1 K/UL (ref 0–0.2)
BASOPHILS RELATIVE PERCENT: 0.5 %
BILIRUB SERPL-MCNC: 1 MG/DL (ref 0.2–0.7)
BUN BLDV-MCNC: 21 MG/DL (ref 8–23)
CALCIUM SERPL-MCNC: 9.4 MG/DL (ref 8.5–9.9)
CHLORIDE BLD-SCNC: 93 MEQ/L (ref 95–107)
CO2: 28 MEQ/L (ref 20–31)
CREAT SERPL-MCNC: 1.51 MG/DL (ref 0.5–0.9)
EOSINOPHILS ABSOLUTE: 0.2 K/UL (ref 0–0.7)
EOSINOPHILS RELATIVE PERCENT: 1.5 %
GFR AFRICAN AMERICAN: 41.4
GFR NON-AFRICAN AMERICAN: 34.2
GLOBULIN: 3.3 G/DL (ref 2.3–3.5)
GLUCOSE BLD-MCNC: 155 MG/DL (ref 70–99)
HCT VFR BLD CALC: 41.5 % (ref 37–47)
HEMOGLOBIN: 13.8 G/DL (ref 12–16)
LYMPHOCYTES ABSOLUTE: 2 K/UL (ref 1–4.8)
LYMPHOCYTES RELATIVE PERCENT: 16 %
MCH RBC QN AUTO: 30.8 PG (ref 27–31.3)
MCHC RBC AUTO-ENTMCNC: 33.2 % (ref 33–37)
MCV RBC AUTO: 93 FL (ref 82–100)
MONOCYTES ABSOLUTE: 0.7 K/UL (ref 0.2–0.8)
MONOCYTES RELATIVE PERCENT: 5.1 %
NEUTROPHILS ABSOLUTE: 9.8 K/UL (ref 1.4–6.5)
NEUTROPHILS RELATIVE PERCENT: 76.9 %
PDW BLD-RTO: 14 % (ref 11.5–14.5)
PLATELET # BLD: 356 K/UL (ref 130–400)
POTASSIUM SERPL-SCNC: 5.5 MEQ/L (ref 3.4–4.9)
RBC # BLD: 4.46 M/UL (ref 4.2–5.4)
SODIUM BLD-SCNC: 135 MEQ/L (ref 135–144)
TOTAL PROTEIN: 7.6 G/DL (ref 6.3–8)
WBC # BLD: 12.8 K/UL (ref 4.8–10.8)

## 2021-04-27 PROCEDURE — 99215 OFFICE O/P EST HI 40 MIN: CPT | Performed by: INTERNAL MEDICINE

## 2021-04-27 PROCEDURE — 3051F HG A1C>EQUAL 7.0%<8.0%: CPT | Performed by: INTERNAL MEDICINE

## 2021-04-27 RX ORDER — BLOOD-GLUCOSE METER
EACH MISCELLANEOUS
COMMUNITY
Start: 2021-03-30

## 2021-04-27 RX ORDER — OXYCODONE HYDROCHLORIDE 5 MG/1
5 TABLET ORAL EVERY 8 HOURS PRN
Qty: 14 TABLET | Refills: 0 | Status: SHIPPED | OUTPATIENT
Start: 2021-04-27 | End: 2021-05-04

## 2021-04-27 RX ORDER — METHOCARBAMOL 500 MG/1
TABLET, FILM COATED ORAL
COMMUNITY
Start: 2021-04-16

## 2021-04-27 RX ORDER — EMPAGLIFLOZIN 25 MG/1
25 TABLET, FILM COATED ORAL DAILY
Qty: 90 TABLET | Refills: 1 | Status: SHIPPED | OUTPATIENT
Start: 2021-04-27 | End: 2021-11-05

## 2021-04-27 RX ORDER — ACLIDINIUM BROMIDE 400 UG/1
1 POWDER, METERED RESPIRATORY (INHALATION) 2 TIMES DAILY
Qty: 1 EACH | Refills: 5 | Status: SHIPPED | OUTPATIENT
Start: 2021-04-27 | End: 2021-05-28 | Stop reason: ALTCHOICE

## 2021-04-27 ASSESSMENT — ENCOUNTER SYMPTOMS
SHORTNESS OF BREATH: 0
VOMITING: 0
CONSTIPATION: 0
DIARRHEA: 0
ABDOMINAL DISTENTION: 1
COUGH: 0
NAUSEA: 0
WHEEZING: 0
ABDOMINAL PAIN: 1

## 2021-04-27 NOTE — PROGRESS NOTES
Subjective:      Patient ID: Todd Park is a 74 y.o. female    Hypertension f/u  Foot swelling x 1 month  RUQ abd pain x 3 months  HPI  Pt presents for follow up regarding treatment of hypertension. Currently on Aldactone and torsemide with good BP control. No lightheadedness or GI side effects. No chest pain or SOB, no one-sided weakness or slurred speech. Leucocytosis and thrombocythemia noted. Will repeat labs. Pt on steroid therapy. No fever chills, no resp issues. Right upper quadrant abd pain is crampy, squeezing, rated 10/10, nonradiating lasting seconds to minutes, stopping her in her tracks. No known aggravating or alleviating factors. No NVCD, no weight loss. LFT elevated. Requests refill of Turdoza and replacement of Symbicort with Advair due to cost.      BG fasting 130s to 160s, up to 200s post meals. Metformin and Jardiance tolerated. Foot swelling x 1 month   Swelling in both feet, no pain but attests gin pressure. NO antcedent trauma or fall. No orthopnea or PND. SOB from FROYLAN and COPD. Depression x 1 yr   Hx depression well controlled on Cymbalta 60. But since losing a close friend she has felt more depressed. NO anxiety, no suicidal ideations or plans. Past Medical History:   Diagnosis Date    Asthma     [facility] obstructive [facility]) [facility]     Depression     Diabetes mellitus ([facility]      Past Surgical History:   Procedure Laterality Date    CHOLECYSTECTOMY      COLONOSCOPY N/A 4/19/2021    COLORECTAL CANCER SCREENING with polypectomies performed by Koir [facility] Marital status:       Spouse name: Not on file    Number of children: Not on file    Years of education: Not on file    Highest education level: Not on file   Occupational History    Not on file   Social Needs    Financial resource strain: Not on file    Food insecurity     Worry: Not on file Inability: Not on file    Transportation needs     Medical: Not on file     Non-medical: Not on file   Tobacco Use    Smoking status: Former Smoker     Packs/day: 1.00     Types: Cigarettes    Smokeless tobacco: Former User     Quit date: 2011   Substance and Sexual Activity    Alcohol use: Not Currently     Comment: occass    Drug use: Never    Sexual activity: Not on file   Lifestyle    Physical activity     Days per week: Not on file     Minutes per session: Not on file    Stress: Not on file   Relationships    Social connections     Talks on phone: Not on file     Gets together: Not on file     Attends Hindu service: Not on file     Active member of club or organization: Not on file     Attends meetings of clubs or organizations: Not on file     Relationship status: Not on file    Intimate partner violence     Fear of current or ex partner: Not on file     Emotionally abused: Not on file     Physically abused: Not on file     Forced sexual activity: Not on file   Other Topics Concern    Not on file   Social History Narrative    Not on file     Family History   Problem Relation Age of Onset    Breast Cancer Mother     Colon Cancer Neg Hx      Allergies:  Budesonide-formoterol fumarate  Patient Active Problem List   Diagnosis    Left leg weakness    Inflammation of left sacroiliac joint ([address]    Piriformis syndrome of left side    Acute back pain with sciatica, left    Intervertebral disc stenosis of neural canal of lumbar region    Postlaminectomy syndrome, lumbar region    Adenomatous polyp of colon    Adenomatous polyp of ascending colon    Adenomatous polyp of transverse colon    Adenomatous polyp of sigmoid colon     Current Outpatient Medications on File Prior to Visit   Medication Sig Dispense Refill    Blood Glucose Monitoring Suppl (ONETOUCH VERIO FLEX SYSTEM) w/Device KIT USE AS DIRECTED      methocarbamol (ROBAXIN) 500 MG tablet Take 1 tablet by mouth twice a day      vitamin B-12 (CYANOCOBALAMIN) 500 MCG tablet Take 500 mcg by mouth daily      Omega-3 Fatty Acids (FISH OIL) 1000 MG CAPS Take 1 capsule by mouth 3 times daily 270 capsule 3    acetaminophen (TYLENOL) 500 MG tablet       blood glucose monitor kit and supplies Dispense sufficient amount for indicated testing frequency plus additional to accommodate PRN testing needs. Dispense all needed supplies to include: monitor, strips, lancing device, lancets, control solutions, alcohol swabs. 1 kit 0    Lancets MISC Test fasting and after largest meal of the day 300 each 5    blood glucose monitor strips DX: diabetes mellitus. Use fasting and after largest meal of the day - Ok to substitute per insurance 100 strip 5    spironolactone (ALDACTONE) 25 MG tablet Take 1 tablet by mouth daily 90 tablet 1    albuterol (PROVENTIL) (2.5 MG/3ML) 0.083% nebulizer solution Take 2.5 mg by nebulization every 6 hours as needed for Wheezing      albuterol sulfate HFA (VENTOLIN HFA) 108 (90 Base) MCG/ACT inhaler Inhale 2 puffs into the lungs every 6 hours as needed for Wheezing      torsemide (DEMADEX) 20 MG tablet Take 20 mg by mouth daily      potassium chloride (KLOR-CON) 20 MEQ packet Take 20 mEq by mouth 2 times daily      DULoxetine (CYMBALTA) 60 MG extended release capsule Take 60 mg by mouth daily      aspirin 81 MG EC tablet Take 81 mg by mouth daily      metFORMIN (GLUCOPHAGE) 500 MG tablet Take 1,000 mg by mouth Daily with supper      montelukast (SINGULAIR) 10 MG tablet Take 10 mg by mouth nightly      pravastatin (PRAVACHOL) 20 MG tablet Take 20 mg by mouth nightly      vitamin D (CHOLECALCIFEROL) 25 MCG (1000 UT) TABS tablet Take 1,000 Units by mouth nightly      gabapentin (NEURONTIN) 100 MG capsule Take 1 capsule by mouth 3 times daily for 30 days. 90 capsule 0     No current facility-administered medications on file prior to visit.       Review of Systems   Constitutional: Negative for chills, diaphoresis, 1. Abdominal cramping in right upper quadrant  CT ABDOMEN PELVIS W IV CONTRAST Additional Contrast? Oral    oxyCODONE (ROXICODONE) 5 MG immediate release tablet   2. Type 2 diabetes mellitus without complication, without long-term current use of insulin ([facility]) Inadequately Controlled  DIABETES FOOT EXAM    empagliflozin (JARDIANCE) 25 MG tablet    will add another to metformin    3. Leukocytosis, unspecified type      repeat lab   4. LFT elevation      await repeat lab. RUQ pain    5. Chronic obstructive pulmonary disease, unspecified COPD type ([facility]) Stable fluticasone-salmeterol (ADVAIR DISKUS) 250-50 MCG/DOSE AEPB    aclidinium (TUDORZA PRESSAIR) 400 MCG/ACT AEPB inhaler    on maintenence therapy. Yet to see pulmo    6. Pedal edema Stable    7. Adjustment disorder with depressed mood      Will let her grief play out and continue same CYmbalta dose      Plan:      Orders Placed This Encounter   Procedures    CT ABDOMEN PELVIS W IV CONTRAST Additional Contrast? Oral     Standing Status:   Future     Standing Expiration Date:   4/27/2022     Order Specific Question:   Additional Contrast?     Answer:   Oral     Order Specific Question:   Reason for exam:     Answer:   right upper quadrnat painful mass     DIABETES FOOT EXAM     Orders Placed This Encounter   Medications    fluticasone-salmeterol (ADVAIR DISKUS) 250-50 MCG/DOSE AEPB     Sig: Inhale 1 puff into the lungs every 12 hours     Dispense:  60 each     Refill:  3    aclidinium (TUDORZA PRESSAIR) 400 MCG/ACT AEPB inhaler     Sig: Inhale 1 puff into the lungs 2 times daily     Dispense:  1 each     Refill:  5    empagliflozin (JARDIANCE) 25 MG tablet     Sig: Take 25 mg by mouth daily     Dispense:  90 tablet     Refill:  1     DC Jardicance 10mg    oxyCODONE (ROXICODONE) 5 MG immediate release tablet     Sig: Take 1 tablet by mouth every 8 hours as needed for Pain (Max of 3 per day.   Do not get narcotic medications from any other doctor.) for up to 7 days. Dispense:  14 tablet     Refill:  0     Reduce doses taken as pain becomes manageable   45 minutes spent in exam room with patient and daughter answering question and discussing management plan. Return in about 1 month (around 5/27/2021).

## 2021-04-28 ENCOUNTER — HOSPITAL ENCOUNTER (OUTPATIENT)
Dept: PHYSICAL THERAPY | Age: 66
Setting detail: THERAPIES SERIES
Discharge: HOME | End: 2021-04-28
Payer: MEDICARE

## 2021-04-28 ENCOUNTER — TELEPHONE (OUTPATIENT)
Dept: PRIMARY CARE CLINIC | Age: 66
End: 2021-04-28

## 2021-04-28 DIAGNOSIS — R10.11 ABDOMINAL CRAMPING IN RIGHT UPPER QUADRANT: Primary | ICD-10-CM

## 2021-04-28 NOTE — PROGRESS NOTES
[address]       Physical Therapy  Cancellation/No-show Note  Patient Name:  Hermann Reed  :  1955   Date:  2021  Referring Practitioner: Dr. Pathak  Diagnosis: Left leg weakness, Lumbar radiculopathy, Acute back pain with LT sciatica, Myalgia    Visit Information:  PT Visit Information  PT Insurance Information: [facility]  Total # of Visits Approved: ($40 copay)  Total # of Visits to Date: 4  Plan of Care/Certification Expiration Date: 21  No Show: 0  Canceled Appointment: 1  Progress Note Counter: 4/10    For today's appointment patient:  []  Cancelled  []  Rescheduled appointment  []  No-show   []  Called pt to remind of next appointment     Reason given by patient:  []  Patient ill  []  Conflicting appointment  []  No transportation    []  Conflict with work  []  No reason given  [x]  Other:       Comments:  Patient wishes to try HEP w/o therapy sessions to self assess, she will contact clinic w/ her decision next week    Signature: Electronically signed by Lloyd Mayers PTA on 21 at 10:07 AM EDT

## 2021-04-28 NOTE — TELEPHONE ENCOUNTER
I ordered CT abd without contrast as routine    Pls call pt and daughter to let them know insurance is giving us problems but we'll await routine CT.  I cancelled the stat CT   But if the abd pain is bad then go to ER

## 2021-04-28 NOTE — TELEPHONE ENCOUNTER
Per Darlene De La Cruz at [place], she will need a peer to peer by Dr. Adam Murray. Their # is [phone] option #1. The case # is [id number]. Her daughter Omi Fabian would like to know what is going on and what they need to do?

## 2021-04-29 ENCOUNTER — TELEPHONE (OUTPATIENT)
Dept: PRIMARY CARE CLINIC | Age: 66
End: 2021-04-29

## 2021-04-29 NOTE — TELEPHONE ENCOUNTER
Dr. Adis Hunter   See denial of her CT Scan that was ordered, I placed original papers on your desk and it was scanned in her chart as well

## 2021-05-02 DIAGNOSIS — E87.5 HYPERKALEMIA: Primary | ICD-10-CM

## 2021-05-02 DIAGNOSIS — D72.829 LEUKOCYTOSIS, UNSPECIFIED TYPE: ICD-10-CM

## 2021-05-02 DIAGNOSIS — N17.9 AKI (ACUTE KIDNEY INJURY) (HCC): ICD-10-CM

## 2021-05-02 DIAGNOSIS — R79.89 LFT ELEVATION: ICD-10-CM

## 2021-05-06 ENCOUNTER — HOSPITAL ENCOUNTER (OUTPATIENT)
Dept: CT IMAGING | Age: 66
Discharge: HOME | End: 2021-05-08
Payer: MEDICARE

## 2021-05-06 DIAGNOSIS — R10.11 ABDOMINAL CRAMPING IN RIGHT UPPER QUADRANT: ICD-10-CM

## 2021-05-06 PROCEDURE — 74150 CT ABDOMEN W/O CONTRAST: CPT

## 2021-05-06 PROCEDURE — 2500000003 HC RX 250 WO HCPCS: Performed by: INTERNAL MEDICINE

## 2021-05-06 RX ADMIN — BARIUM SULFATE 450 ML: 20 SUSPENSION ORAL at 08:52

## 2021-05-07 ENCOUNTER — TELEPHONE (OUTPATIENT)
Dept: PRIMARY CARE CLINIC | Age: 66
End: 2021-05-07

## 2021-05-07 ENCOUNTER — OFFICE VISIT (OUTPATIENT)
Dept: GASTROENTEROLOGY | Age: 66
End: 2021-05-07
Payer: MEDICARE

## 2021-05-07 VITALS
RESPIRATION RATE: 18 BRPM | HEART RATE: 94 BPM | HEIGHT: 66 IN | WEIGHT: 293 LBS | OXYGEN SATURATION: 96 % | BODY MASS INDEX: 47.09 KG/M2

## 2021-05-07 DIAGNOSIS — K91.89 POSTCHOLECYSTECTOMY DIARRHEA: ICD-10-CM

## 2021-05-07 DIAGNOSIS — R19.7 POSTCHOLECYSTECTOMY DIARRHEA: ICD-10-CM

## 2021-05-07 DIAGNOSIS — R10.13 EPIGASTRIC PAIN: Primary | ICD-10-CM

## 2021-05-07 PROCEDURE — 99204 OFFICE O/P NEW MOD 45 MIN: CPT | Performed by: SPECIALIST

## 2021-05-07 RX ORDER — CHOLESTYRAMINE 4 G/9G
1 POWDER, FOR SUSPENSION ORAL 2 TIMES DAILY
Qty: 30 PACKET | Refills: 2 | Status: SHIPPED | OUTPATIENT
Start: 2021-05-07 | End: 2021-12-10 | Stop reason: SDUPTHER

## 2021-05-07 ASSESSMENT — ENCOUNTER SYMPTOMS
VOMITING: 0
DIARRHEA: 0
CONSTIPATION: 0
SHORTNESS OF BREATH: 1
ABDOMINAL PAIN: 1
ANAL BLEEDING: 0
RECTAL PAIN: 0
BLOOD IN STOOL: 0
ABDOMINAL DISTENTION: 0
NAUSEA: 0
EYES NEGATIVE: 1

## 2021-05-07 NOTE — PROGRESS NOTES
Procedure Laterality Date    CHOLECYSTECTOMY      COLONOSCOPY N/A 4/19/2021    COLORECTAL CANCER SCREENING with polypectomies performed by Pedro Pablo Guidry MD at [facility]     Current Outpatient Medications on File Prior to Visit   Medication Sig Dispense Refill    Blood Glucose Monitoring Suppl ([address]) w/Device KIT USE AS DIRECTED      methocarbamol (ROBAXIN) 500 MG tablet Take 1 tablet by mouth twice a day      fluticasone-salmeterol (ADVAIR DISKUS) 250-50 MCG/DOSE AEPB Inhale 1 puff into the lungs every 12 hours 60 each 3    aclidinium (TUDORZA PRESSAIR) 400 MCG/ACT AEPB inhaler Inhale 1 puff into the lungs 2 times daily 1 each 5    empagliflozin (JARDIANCE) 25 MG tablet Take 25 mg by mouth daily 90 tablet 1    vitamin B-12 (CYANOCOBALAMIN) 500 MCG tablet Take 500 mcg by mouth daily      Omega-3 Fatty Acids (FISH OIL) 1000 MG CAPS Take 1 capsule by mouth 3 times daily 270 capsule 3    acetaminophen (TYLENOL) 500 MG tablet       blood glucose monitor kit and supplies Dispense sufficient amount for indicated testing frequency plus additional to accommodate PRN testing needs. Dispense all needed supplies to include: monitor, strips, lancing device, lancets, control solutions, alcohol swabs. 1 kit 0    Lancets MISC Test fasting and after largest meal of the day 300 each 5    blood glucose monitor strips DX: diabetes mellitus.  Use fasting and after largest meal of the day - Ok to substitute per insurance 100 strip 5    spironolactone (ALDACTONE) 25 MG tablet Take 1 tablet by mouth daily 90 tablet 1    albuterol (PROVENTIL) (2.5 MG/3ML) 0.083% nebulizer solution Take 2.5 mg by nebulization every 6 hours as needed for Wheezing      albuterol sulfate HFA (VENTOLIN HFA) 108 (90 Base) MCG/ACT inhaler Inhale 2 puffs into the lungs every 6 hours as needed for Wheezing      torsemide (DEMADEX) 20 MG tablet Take 20 mg by mouth daily      potassium chloride (KLOR-CON) 20 MEQ packet Take 20 mEq by mouth 2 times daily      DULoxetine (CYMBALTA) 60 MG extended release capsule Take 60 mg by mouth daily      aspirin 81 MG EC tablet Take 81 mg by mouth daily      metFORMIN (GLUCOPHAGE) 500 MG tablet Take 1,000 mg by mouth Daily with supper      montelukast (SINGULAIR) 10 MG tablet Take 10 mg by mouth nightly      pravastatin (PRAVACHOL) 20 MG tablet Take 20 mg by mouth nightly      vitamin D (CHOLECALCIFEROL) 25 MCG (1000 UT) TABS tablet Take 1,000 Units by mouth nightly      gabapentin (NEURONTIN) 100 MG capsule Take 1 capsule by mouth 3 times daily for 30 days. 90 capsule 0     No current facility-administered medications on file prior to visit.       Family History   Problem Relation Age of Onset    Breast Cancer Mother     Colon Cancer Neg Hx       Social History     Socioeconomic History    Marital status:      Spouse name: None    Number of children: None    Years of education: None    Highest education level: None   Occupational History    None   Social Needs    Financial resource strain: None    Food insecurity     Worry: None     Inability: None    Transportation needs     Medical: None     Non-medical: None   Tobacco Use    Smoking status: Former Smoker     Packs/day: 1.00     Types: Cigarettes    Smokeless tobacco: Former User     Quit date: 2011   Substance and Sexual Activity    Alcohol use: Not Currently     Comment: occass    Drug use: Never    Sexual activity: None   Lifestyle    Physical activity     Days per week: None     Minutes per session: None    Stress: None   Relationships    Social connections     Talks on phone: None     Gets together: None     Attends Restoration service: None     Active member of club or organization: None     Attends meetings of clubs or organizations: None     Relationship status: None    Intimate partner violence     Fear of current or ex partner: None     Emotionally abused: None     Physically abused: None Forced sexual activity: None   Other Topics Concern    None   Social History Narrative    None       Pulse 94, resp. rate 18, height 5' 6\" (1.676 m), weight (!) 320 lb (145.2 kg), SpO2 96 %. Physical Exam  Constitutional:       Appearance: She is well-developed. HENT:      Head: Normocephalic and atraumatic. Eyes:      Conjunctiva/sclera: Conjunctivae normal.      Pupils: Pupils are equal, round, and reactive to light. Neck:      Musculoskeletal: Normal range of motion. Cardiovascular:      Rate and Rhythm: Normal rate. Pulmonary:      Effort: Pulmonary effort is normal.   Abdominal:      General: Bowel sounds are normal.      Palpations: Abdomen is soft. Comments: Minimal tenderness start in the upper abdominal wall especially when patient tries to lift up both her lower extremity, which is indicative of parietal wall etiology. Musculoskeletal: Normal range of motion. Skin:     General: Skin is warm. Comments: Multiple skin tags   Neurological:      Mental Status: She is alert.          Laboratory, Pathology, Radiology reviewed indetail with relevant important investigations summarized below:  Lab Results   Component Value Date    WBC 12.8 (H) 04/27/2021    HGB 13.8 04/27/2021    HCT 41.5 04/27/2021    MCV 93.0 04/27/2021     04/27/2021     Lab Results   Component Value Date    ALT 48 (H) 04/27/2021    AST 53 (H) 04/27/2021    ALKPHOS 89 04/27/2021    BILITOT 1.0 (H) 04/27/2021       Echo 2d W Doppler W Color Complete    Result Date: 4/7/2021  Transthoracic Echocardiography Report (TTE)  Demographics  Patient Name    Thao Nye Gender               Female  Patient Number  [id number]       Race                                                  Ethnicity  Visit Number    [id number]      Room Number          OP  Corporate ID                   Date of Study        04/07/2021  Accession       [id number]     Referring Physician  Steffany De La Rosa  Date of Birth   1955 Sonographer          [facility]  Age             74 year(s)     Interpreting         [facility])                                 Physician            Cardiology                                                      Holiday Ulises PULIDO DO Procedure Type of Study  TTE procedure:ECHO COMPLETE 2D W/DOP W/COLOR. Procedure Date Date: 04/07/2021 Start: 02:30 PM Study Location: Echo Lab Technical Quality: Limited visualization Indications:Congestive heart failure and Shortness of breath. Patient Status: Routine Height: 66 inches Weight: 313 pounds BSA: 2.42 m^2 BMI: 50.52 kg/m^2 BP: 132/92 mmHg  Conclusions  Summary  Normal left ventricular systolic function, no regional wall motion  abnormalities, estimated ejection fraction of 60%. Normal left ventricular size and function. Mild concentric left ventricular hypertrophy. Impaired relaxation compatible with diastolic dysfunction. ( reversed E/A  ratio)  Trace (+) mitral regurgitation is present. No evidence of mitral valve stenosis. No evidence of aortic valve regurgitation . No evidence of aortic valve stenosis. There is Trace tricuspid regurgitation with estimated RVSP of 15 mm Hg. The left atrium is Mildly dilated. Signature  ----------------------------------------------------------------  Electronically signed by Clifton Oscar DO(Interpreting  physician) on 04/10/2021 11:00 AM  ----------------------------------------------------------------  Findings Left Ventricle Normal left ventricular systolic function, no regional wall motion abnormalities, estimated ejection fraction of 60%. Normal left ventricular size and function. Mild concentric left ventricular hypertrophy. Impaired relaxation compatible with diastolic dysfunction. ( reversed E/A ratio) Right Ventricle Normal right ventricle structure and function. Normal right ventricle systolic pressure. Left Atrium The left atrium is Mildly dilated. Right Atrium Normal right atrium.  Mitral Valve Diffusely thickened and pliable mitral valve leaflets with normal excursion. Trace (+) mitral regurgitation is present. No evidence of mitral valve stenosis. Tricuspid Valve Normal tricuspid valve structure and function. There is Trace tricuspid regurgitation with estimated RVSP of 15 mm Hg. Aortic Valve The aortic valve leaflets were not well visualized. No evidence of aortic valve regurgitation . No evidence of aortic valve stenosis. Pulmonic Valve Normal pulmonic valve structure and function. Pericardial Effusion No evidence of pericardial effusion. Pleural Effusion No evidence of pleural effusion. Aorta \ Miscellaneous Miscellaneous normal findings were found. M-Mode Measurements (cm)  LVIDd: 4.8 cm                          LVIDs: 3.54 cm  IVSd: 1.05 cm                          IVSs: 1.37 cm  LVPWd: 1.22 cm                         LVPWs: 1.42 cm  Rt. Vent.  Dimension: 3.72 cm           AO Root Dimension: 3.57 cm                                         ACS: 1.9 cm                                         LA: 3.2 cm                                         LVOT: 2.25 cm Doppler Measurements:  AV Velocity:0.03 m/s                  MV Peak E-Wave: 0.57 m/s  AV Peak Gradient: 5.37 mmHg           MV Peak A-Wave: 0.86 m/s  AV Mean Gradient: 3.05 mmHg  AV Area (Continuity):3 cm^2  TR Velocity:1.73 m/s                  Estimated RAP:3 mmHg  TR Gradient:12.03 mmHg                RVSP:15.03 mmHg Valves  Mitral Valve  Peak E-Wave: 0.57 m/s                 Peak A-Wave: 0.86 m/s                                        E/A Ratio: 0.66                                        Peak Gradient: 1.28 mmHg                                        Deceleration Time: 160.9 msec  Tissue Doppler  E' Septal Velocity: 0.11 m/s  E' Lateral Velocity: 0.13 m/s  Aortic Valve  Peak Velocity: 1.16 m/s             Mean Velocity: 0.81 m/s  Peak Gradient: 5.37 mmHg            Mean Gradient: 3.05 mmHg  Area (continuity): 3 cm^2           Area (2D): 3 cm^2  AV VTI: 19.8 cm  Cusp Separation: 1.9 cm  Tricuspid Valve  Estimated RVSP: 15.03 mmHg              Estimated RAP: 3 mmHg  TR Velocity: 1.73 m/s                   TR Gradient: 12.03 mmHg  Pulmonic Valve           Estimated PASP: 15.03 mmHg  LVOT  Peak Velocity: 0.83 m/s              Mean Velocity: 0.52 m/s  Peak Gradient: 2.72 mmHg             Mean Gradient: 1.25 mmHg  LVOT Diameter: 2.25 cm               LVOT VTI: 14.97 cm Structures  Left Atrium  LA Dimension: 3.2 cm                         LA Area: 11.64 cm^2  LA/Aorta: 0.9  LA Volume/Index: 29.25 ml /12 m^2  Left Ventricle  Diastolic Dimension: 4.8 cm           Systolic Dimension: 9.07 cm  Septum Diastolic: 6.23 cm             Septum Systolic: 2.34 cm  PW Diastolic: 0.77 cm                 PW Systolic: 4.73 cm                                        FS: 26.3 %  LV EDV/LV EDV Index: 107.58 ml/44 m^2 LV ESV/LV ESV Index: 52.42 ml/22 m^2  EF Calculated: 51.3 %                 LV Length: 6.92 cm  LVOT Diameter: 2.25 cm  Right Atrium  RA Systolic Pressure: 3 mmHg  Right Ventricle  Diastolic Dimension: 1.90 cm                                    RV Systolic Pressure: 82.00 mmHg Aorta/ Miscellaneous Aorta  Aortic Root: 3.57 cm  LVOT Diameter: 2.25 cm    Ct Abdomen Pelvis W Wo Contrast Additional Contrast? None    Result Date: 4/13/2021  EXAMINATION: CT ABDOMEN PELVIS W WO CONTRAST DATE AND TIME:4/13/2021 10:52 AM CLINICAL HISTORY: Acute abdominal pain. Epigastric pain. N28.1 Renal cyst ICD10 COMPARISON: None available. TECHNIQUE: Contiguous axial CT sections of the abdomen and pelvis. 50 cc's of IV contrast given. All CT scans at this facility use dose modulation, iterative reconstruction, and/or weight based dosing when appropriate to reduce radiation dose to as low  as reasonably achievable.  FINDINGS   Liver: Negative     Spleen: Negative    Pancreas: Negative     Kidney: There is a partially exophytic 1.5 cm simple cyst projecting off the mid pole of the right kidney posteriorly. 2.7 cm predominantly exophytic simple cyst projecting off the lower pole of the right kidney. 2.2 cm partially exophytic simple cyst projecting off the lower pole of the left kidney. 8 mm partially exophytic simple cyst projecting off the lower pole of the left kidney. 8 mm hyperattenuating lesion projecting off the anterior lower pole of the left kidney. This is consistent with a hyperdense cyst. These cysts are consistent with Bosniak type I and II cystic renal masses requiring no follow-up. Adrenal glands are negative. Bowel: Negative    Nodes: No lymphadenopathy. Aorta: No aneurysm    Peritoneum: No free fluid or free air. The abdominal wall is intact. Pelvis: No abnormal soft tissue mass. The bladder is negative. Bones: No acute osseous abnormalities. Lung bases:No acute or significant finding       BILATERAL TYPE I AND TYPE II BOSNIAK RENAL CYSTS REQUIRING NO FOLLOW-UP. Ct Abdomen Wo Contrast Additional Contrast? Oral    Result Date: 5/6/2021  EXAMINATION: CT ABDOMEN WO CONTRAST DATE AND TIME:5/6/2021 8:49 AM CLINICAL HISTORY: Acute abdominal pain. Epigastric pain. R10.11 Abdominal cramping in right upper quadrant ICD10 COMPARISON: April 13, 2021 TECHNIQUE: Contiguous axial CT sections of the abdomen. Oral and 100 cc's of IV contrast given. .  All CT scans at this facility use dose modulation, iterative reconstruction, and/or weight based dosing when appropriate to reduce radiation dose to as low as reasonably achievable. Please note that this report may not mention incidental findings that  represent probable benign entities considered to have little or no clinical relevance. Findings    Liver: Negative. Gallbladder: Patient is status post cholecystectomy. Spleen: Negative      Pancreas: Negative   Kidney: Stable bilateral renal cysts. No hydronephrosis. No renal stone. . Negative   Bowel: The bowel is not dilated.  There is no evidence of diverticulitis or

## 2021-05-11 DIAGNOSIS — R79.89 LFT ELEVATION: ICD-10-CM

## 2021-05-11 DIAGNOSIS — J44.9 CHRONIC OBSTRUCTIVE PULMONARY DISEASE, UNSPECIFIED COPD TYPE (HCC): ICD-10-CM

## 2021-05-11 DIAGNOSIS — E87.5 HYPERKALEMIA: ICD-10-CM

## 2021-05-11 DIAGNOSIS — N17.9 AKI (ACUTE KIDNEY INJURY) (HCC): ICD-10-CM

## 2021-05-11 LAB
ALBUMIN SERPL-MCNC: 4.5 G/DL (ref 3.5–4.6)
ALP BLD-CCNC: 78 U/L (ref 40–130)
ALT SERPL-CCNC: 43 U/L (ref 0–33)
ANION GAP SERPL CALCULATED.3IONS-SCNC: 13 MEQ/L (ref 9–15)
AST SERPL-CCNC: 49 U/L (ref 0–35)
BILIRUB SERPL-MCNC: 0.7 MG/DL (ref 0.2–0.7)
BUN BLDV-MCNC: 20 MG/DL (ref 8–23)
CALCIUM SERPL-MCNC: 10.6 MG/DL (ref 8.5–9.9)
CHLORIDE BLD-SCNC: 98 MEQ/L (ref 95–107)
CO2: 27 MEQ/L (ref 20–31)
CREAT SERPL-MCNC: 1.59 MG/DL (ref 0.5–0.9)
GFR AFRICAN AMERICAN: 39
GFR NON-AFRICAN AMERICAN: 32.3
GLOBULIN: 3.1 G/DL (ref 2.3–3.5)
GLUCOSE BLD-MCNC: 116 MG/DL (ref 70–99)
POTASSIUM SERPL-SCNC: 4.8 MEQ/L (ref 3.4–4.9)
SODIUM BLD-SCNC: 138 MEQ/L (ref 135–144)
TOTAL PROTEIN: 7.6 G/DL (ref 6.3–8)

## 2021-05-12 ENCOUNTER — OFFICE VISIT (OUTPATIENT)
Dept: PULMONOLOGY | Age: 66
End: 2021-05-12
Payer: MEDICARE

## 2021-05-12 VITALS
RESPIRATION RATE: 18 BRPM | TEMPERATURE: 97.1 F | SYSTOLIC BLOOD PRESSURE: 116 MMHG | BODY MASS INDEX: 47.09 KG/M2 | DIASTOLIC BLOOD PRESSURE: 64 MMHG | HEART RATE: 96 BPM | WEIGHT: 293 LBS | HEIGHT: 66 IN | OXYGEN SATURATION: 99 %

## 2021-05-12 DIAGNOSIS — J44.9 CHRONIC OBSTRUCTIVE PULMONARY DISEASE, UNSPECIFIED COPD TYPE (HCC): Primary | ICD-10-CM

## 2021-05-12 DIAGNOSIS — E55.9 VITAMIN D DEFICIENCY: ICD-10-CM

## 2021-05-12 DIAGNOSIS — F17.218 CIGARETTE NICOTINE DEPENDENCE WITH OTHER NICOTINE-INDUCED DISORDER: ICD-10-CM

## 2021-05-12 DIAGNOSIS — J96.11 CHRONIC RESPIRATORY FAILURE WITH HYPOXIA (HCC): ICD-10-CM

## 2021-05-12 DIAGNOSIS — E66.01 CLASS 3 SEVERE OBESITY DUE TO EXCESS CALORIES WITH SERIOUS COMORBIDITY AND BODY MASS INDEX (BMI) OF 50.0 TO 59.9 IN ADULT (HCC): ICD-10-CM

## 2021-05-12 DIAGNOSIS — G47.33 OSA (OBSTRUCTIVE SLEEP APNEA): ICD-10-CM

## 2021-05-12 PROCEDURE — 99204 OFFICE O/P NEW MOD 45 MIN: CPT | Performed by: INTERNAL MEDICINE

## 2021-05-12 RX ORDER — FLUTICASONE FUROATE, UMECLIDINIUM BROMIDE AND VILANTEROL TRIFENATATE 200; 62.5; 25 UG/1; UG/1; UG/1
1 POWDER RESPIRATORY (INHALATION) DAILY
Qty: 1 EACH | Refills: 3 | Status: SHIPPED | OUTPATIENT
Start: 2021-05-12 | End: 2021-05-17 | Stop reason: SDUPTHER

## 2021-05-12 NOTE — PROGRESS NOTES
Alcohol use: Not Currently     Comment: occass    Drug use: Never    Sexual activity: Not on file   Lifestyle    Physical activity     Days per week: Not on file     Minutes per session: Not on file    Stress: Not on file   Relationships    Social connections     Talks on phone: Not on file     Gets together: Not on file     Attends Voodoo service: Not on file     Active member of club or organization: Not on file     Attends meetings of clubs or organizations: Not on file     Relationship status: Not on file    Intimate partner violence     Fear of current or ex partner: Not on file     Emotionally abused: Not on file     Physically abused: Not on file     Forced sexual activity: Not on file   Other Topics Concern    Not on file   Social History Narrative    Not on file         Review of Systems      ROS: 10 organs review of system is done including general, psychological, ENT, hematological, endocrine, respiratory, cardiovascular, gastrointestinal,musculoskeletal, neurological,  allergy and Immunology is done and is otherwise negative.     Current Outpatient Medications   Medication Sig Dispense Refill    Fluticasone-Umeclidin-Vilant (TRELEGY ELLIPTA) 200-62.5-25 MCG/INH AEPB Inhale 1 puff into the lungs daily 1 each 3    fluticasone-salmeterol (ADVAIR DISKUS) 250-50 MCG/DOSE AEPB Inhale 1 puff into the lungs every 12 hours 60 each 3    cholestyramine (QUESTRAN) 4 g packet Take 1 packet by mouth 2 times daily 30 packet 2    Blood Glucose Monitoring Suppl ([employer] SYSTEM) w/Device KIT USE AS DIRECTED      methocarbamol (ROBAXIN) 500 MG tablet Take 1 tablet by mouth twice a day      empagliflozin (JARDIANCE) 25 MG tablet Take 25 mg by mouth daily 90 tablet 1    vitamin B-12 (CYANOCOBALAMIN) 500 MCG tablet Take 500 mcg by mouth daily      Omega-3 Fatty Acids (FISH OIL) 1000 MG CAPS Take 1 capsule by mouth 3 times daily 270 capsule 3    acetaminophen (TYLENOL) 500 MG tablet       blood glucose monitor kit and supplies Dispense sufficient amount for indicated testing frequency plus additional to accommodate PRN testing needs. Dispense all needed supplies to include: monitor, strips, lancing device, lancets, control solutions, alcohol swabs. 1 kit 0    Lancets MISC Test fasting and after largest meal of the day 300 each 5    blood glucose monitor strips DX: diabetes mellitus. Use fasting and after largest meal of the day - Ok to substitute per insurance 100 strip 5    spironolactone (ALDACTONE) 25 MG tablet Take 1 tablet by mouth daily 90 tablet 1    albuterol (PROVENTIL) (2.5 MG/3ML) 0.083% nebulizer solution Take 2.5 mg by nebulization every 6 hours as needed for Wheezing      albuterol sulfate HFA (VENTOLIN HFA) 108 (90 Base) MCG/ACT inhaler Inhale 2 puffs into the lungs every 6 hours as needed for Wheezing      torsemide (DEMADEX) 20 MG tablet Take 20 mg by mouth daily      potassium chloride (KLOR-CON) 20 MEQ packet Take 20 mEq by mouth 2 times daily      DULoxetine (CYMBALTA) 60 MG extended release capsule Take 60 mg by mouth daily      aspirin 81 MG EC tablet Take 81 mg by mouth daily      metFORMIN (GLUCOPHAGE) 500 MG tablet Take 1,000 mg by mouth Daily with supper      montelukast (SINGULAIR) 10 MG tablet Take 10 mg by mouth nightly      pravastatin (PRAVACHOL) 20 MG tablet Take 20 mg by mouth nightly      vitamin D (CHOLECALCIFEROL) 25 MCG (1000 UT) TABS tablet Take 1,000 Units by mouth nightly      aclidinium (TUDORZA PRESSAIR) 400 MCG/ACT AEPB inhaler Inhale 1 puff into the lungs 2 times daily (Patient not taking: Reported on 5/12/2021) 1 each 5    gabapentin (NEURONTIN) 100 MG capsule Take 1 capsule by mouth 3 times daily for 30 days. 90 capsule 0     No current facility-administered medications for this visit.         Objective:     Vitals:    05/12/21 1135   BP: 116/64   Site: Right Lower Arm   Position: Sitting   Cuff Size: Medium Adult   Pulse: 96   Resp: 18   Temp: 25 Hydroxy   4. FROYLAN (obstructive sleep apnea)  Sleep Study with PAP Titration   5. Class 3 severe obesity due to excess calories with serious comorbidity and body mass index (BMI) of 50.0 to 59.9 in adult [facility])       · COPD with chronic respiratory failure, will change inhalers to Trelegy Ellipta, will obtain PFT, patient instructed to hold inhalers 1 day prior to the PFT result, patient instructed to gargle swish and spit after each use of inhaled corticosteroid. Will check ABGs rule out chronic hypercapnia, patient might need trilogy machine, and will check vitamin D level. · History of smoking, quit 8 years ago, will obtain CT lung cancer screening  · FROYLAN, severity is not clear, will repeat sleep study, patient currently on CPAP at 13 cmH2O, and evaluate on follow-up. · Weight loss strongly recommended, diet and lifestyle modification discussed with the patient. Orders Placed This Encounter   Procedures    CT lung screen [Initial/Annual]     Age: 74 y.o. Smoking History:   Social History    Tobacco Use      Smoking status: Former Smoker        Packs/day: 1.00        Types: Cigarettes      Smokeless tobacco: Former User        Quit date: 2011    Alcohol use: Not Currently      Comment: occass    Drug use: Never    Pack years: 0  Last CT lung screen: No previous lung cancer screening exam     Standing Status:   Future     Standing Expiration Date:   5/12/2022     Order Specific Question:   Is there documentation of shared decision making? Answer:   Yes     Order Specific Question:   Does the patient show any signs or symptoms of lung cancer? Answer:   No     Order Specific Question:   Is this the first (baseline) CT or an annual exam?     Answer:   Baseline [1]     Order Specific Question:   Is this a low dose CT or a routine CT? Answer:   Low Dose CT [1]     Order Specific Question:   Smoking Status? Answer:    Former Smoker [4]     Order Specific Question:   Date quit smoking? (must be

## 2021-05-13 ENCOUNTER — TELEPHONE (OUTPATIENT)
Dept: CASE MANAGEMENT | Age: 66
End: 2021-05-13

## 2021-05-17 DIAGNOSIS — J44.9 CHRONIC OBSTRUCTIVE PULMONARY DISEASE, UNSPECIFIED COPD TYPE (HCC): ICD-10-CM

## 2021-05-17 RX ORDER — FLUTICASONE FUROATE, UMECLIDINIUM BROMIDE AND VILANTEROL TRIFENATATE 200; 62.5; 25 UG/1; UG/1; UG/1
1 POWDER RESPIRATORY (INHALATION) DAILY
Qty: 3 EACH | Refills: 1 | Status: SHIPPED | OUTPATIENT
Start: 2021-05-17 | End: 2022-06-24 | Stop reason: SDUPTHER

## 2021-05-18 ENCOUNTER — TELEPHONE (OUTPATIENT)
Dept: PRIMARY CARE CLINIC | Age: 66
End: 2021-05-18

## 2021-05-18 ENCOUNTER — HOSPITAL ENCOUNTER (OUTPATIENT)
Dept: RESPIRATORY THERAPY | Age: 66
Discharge: HOME | End: 2021-05-18
Payer: MEDICARE

## 2021-05-18 DIAGNOSIS — E55.9 VITAMIN D DEFICIENCY: ICD-10-CM

## 2021-05-18 DIAGNOSIS — J44.9 CHRONIC OBSTRUCTIVE PULMONARY DISEASE, UNSPECIFIED COPD TYPE (HCC): ICD-10-CM

## 2021-05-18 LAB
BASE EXCESS ARTERIAL: 3 (ref -3–3)
CALCIUM IONIZED: 1.13 MMOL/L (ref 1.12–1.32)
GFR AFRICAN AMERICAN: 42
GFR NON-AFRICAN AMERICAN: 35
GLUCOSE BLD-MCNC: 132 MG/DL (ref 60–115)
HCO3 ARTERIAL: 26.5 MMOL/L (ref 21–29)
HEMOGLOBIN: 14.3 GM/DL (ref 12–16)
LACTATE: 2.76 MMOL/L (ref 0.4–2)
O2 SAT, ARTERIAL: 98 % (ref 93–100)
PCO2 ARTERIAL: 37 MM HG (ref 35–45)
PERFORMED ON: ABNORMAL
PH ARTERIAL: 7.46 (ref 7.35–7.45)
PO2 ARTERIAL: 102 MM HG (ref 75–108)
POC CHLORIDE: 100 MEQ/L (ref 99–110)
POC CREATININE: 1.5 MG/DL (ref 0.6–1.2)
POC FIO2: 3
POC HEMATOCRIT: 42 % (ref 36–48)
POC POTASSIUM: 3.5 MEQ/L (ref 3.5–5.1)
POC SAMPLE TYPE: ABNORMAL
POC SODIUM: 139 MEQ/L (ref 136–145)
TCO2 ARTERIAL: 28 (ref 22–29)
VITAMIN D 25-HYDROXY: 41.8 NG/ML (ref 30–100)

## 2021-05-18 PROCEDURE — 84132 ASSAY OF SERUM POTASSIUM: CPT

## 2021-05-18 PROCEDURE — 82565 ASSAY OF CREATININE: CPT

## 2021-05-18 PROCEDURE — 84295 ASSAY OF SERUM SODIUM: CPT

## 2021-05-18 PROCEDURE — 82435 ASSAY OF BLOOD CHLORIDE: CPT

## 2021-05-18 PROCEDURE — 82803 BLOOD GASES ANY COMBINATION: CPT

## 2021-05-18 PROCEDURE — 82330 ASSAY OF CALCIUM: CPT

## 2021-05-18 PROCEDURE — 83605 ASSAY OF LACTIC ACID: CPT

## 2021-05-18 PROCEDURE — 85014 HEMATOCRIT: CPT

## 2021-05-19 ENCOUNTER — TELEPHONE (OUTPATIENT)
Dept: PRIMARY CARE CLINIC | Age: 66
End: 2021-05-19

## 2021-05-25 ENCOUNTER — HOSPITAL ENCOUNTER (OUTPATIENT)
Dept: PULMONOLOGY | Age: 66
Discharge: HOME | End: 2021-05-25
Payer: MEDICARE

## 2021-05-25 ENCOUNTER — HOSPITAL ENCOUNTER (OUTPATIENT)
Dept: CT IMAGING | Age: 66
Discharge: HOME | End: 2021-05-27
Payer: MEDICARE

## 2021-05-25 DIAGNOSIS — J44.9 CHRONIC OBSTRUCTIVE PULMONARY DISEASE, UNSPECIFIED COPD TYPE (HCC): ICD-10-CM

## 2021-05-25 DIAGNOSIS — F17.218 CIGARETTE NICOTINE DEPENDENCE WITH OTHER NICOTINE-INDUCED DISORDER: ICD-10-CM

## 2021-05-25 PROCEDURE — 94726 PLETHYSMOGRAPHY LUNG VOLUMES: CPT

## 2021-05-25 PROCEDURE — 71271 CT THORAX LUNG CANCER SCR C-: CPT

## 2021-05-25 PROCEDURE — 6360000002 HC RX W HCPCS: Performed by: INTERNAL MEDICINE

## 2021-05-25 PROCEDURE — 94729 DIFFUSING CAPACITY: CPT

## 2021-05-25 PROCEDURE — 94060 EVALUATION OF WHEEZING: CPT

## 2021-05-25 RX ORDER — ALBUTEROL SULFATE 2.5 MG/3ML
2.5 SOLUTION RESPIRATORY (INHALATION) ONCE
Status: COMPLETED | OUTPATIENT
Start: 2021-05-25 | End: 2021-05-25

## 2021-05-25 RX ADMIN — ALBUTEROL SULFATE 2.5 MG: 2.5 SOLUTION RESPIRATORY (INHALATION) at 11:05

## 2021-05-26 PROCEDURE — 94729 DIFFUSING CAPACITY: CPT | Performed by: INTERNAL MEDICINE

## 2021-05-26 PROCEDURE — 94726 PLETHYSMOGRAPHY LUNG VOLUMES: CPT | Performed by: INTERNAL MEDICINE

## 2021-05-26 PROCEDURE — 94060 EVALUATION OF WHEEZING: CPT | Performed by: INTERNAL MEDICINE

## 2021-05-28 ENCOUNTER — OFFICE VISIT (OUTPATIENT)
Dept: PRIMARY CARE CLINIC | Age: 66
End: 2021-05-28
Payer: MEDICARE

## 2021-05-28 VITALS
RESPIRATION RATE: 15 BRPM | BODY MASS INDEX: 47.09 KG/M2 | DIASTOLIC BLOOD PRESSURE: 80 MMHG | HEART RATE: 95 BPM | SYSTOLIC BLOOD PRESSURE: 122 MMHG | HEIGHT: 66 IN | OXYGEN SATURATION: 95 % | WEIGHT: 293 LBS

## 2021-05-28 DIAGNOSIS — Z00.00 ROUTINE GENERAL MEDICAL EXAMINATION AT A HEALTH CARE FACILITY: Primary | ICD-10-CM

## 2021-05-28 DIAGNOSIS — E11.9 TYPE 2 DIABETES MELLITUS WITHOUT COMPLICATION, WITHOUT LONG-TERM CURRENT USE OF INSULIN (HCC): ICD-10-CM

## 2021-05-28 DIAGNOSIS — E87.5 HYPERKALEMIA: ICD-10-CM

## 2021-05-28 DIAGNOSIS — J44.9 CHRONIC OBSTRUCTIVE PULMONARY DISEASE, UNSPECIFIED COPD TYPE (HCC): Primary | ICD-10-CM

## 2021-05-28 PROCEDURE — G0438 PPPS, INITIAL VISIT: HCPCS | Performed by: INTERNAL MEDICINE

## 2021-05-28 PROCEDURE — 99214 OFFICE O/P EST MOD 30 MIN: CPT | Performed by: INTERNAL MEDICINE

## 2021-05-28 PROCEDURE — 3051F HG A1C>EQUAL 7.0%<8.0%: CPT | Performed by: INTERNAL MEDICINE

## 2021-05-28 ASSESSMENT — ENCOUNTER SYMPTOMS
DIARRHEA: 0
ABDOMINAL PAIN: 0
COUGH: 0
VOMITING: 0
WHEEZING: 0
SHORTNESS OF BREATH: 0
NAUSEA: 0

## 2021-05-28 ASSESSMENT — PATIENT HEALTH QUESTIONNAIRE - PHQ9
9. THOUGHTS THAT YOU WOULD BE BETTER OFF DEAD, OR OF HURTING YOURSELF: 1
SUM OF ALL RESPONSES TO PHQ QUESTIONS 1-9: 4
2. FEELING DOWN, DEPRESSED OR HOPELESS: 2
6. FEELING BAD ABOUT YOURSELF - OR THAT YOU ARE A FAILURE OR HAVE LET YOURSELF OR YOUR FAMILY DOWN: 0
SUM OF ALL RESPONSES TO PHQ9 QUESTIONS 1 & 2: 3
7. TROUBLE CONCENTRATING ON THINGS, SUCH AS READING THE NEWSPAPER OR WATCHING TELEVISION: 0

## 2021-05-28 ASSESSMENT — LIFESTYLE VARIABLES
AUDIT-C TOTAL SCORE: INCOMPLETE
HOW OFTEN DO YOU HAVE A DRINK CONTAINING ALCOHOL: NEVER
AUDIT TOTAL SCORE: INCOMPLETE

## 2021-05-28 ASSESSMENT — SOCIAL DETERMINANTS OF HEALTH (SDOH): HOW HARD IS IT FOR YOU TO PAY FOR THE VERY BASICS LIKE FOOD, HOUSING, MEDICAL CARE, AND HEATING?: NOT HARD AT ALL

## 2021-05-28 NOTE — PROGRESS NOTES
Medicare Annual Wellness Visit  Name: Sal Rose Date: 2021   MRN: [id number] Sex: Female   Age: 74 y.o. Ethnicity: Non-/Non    : 1955 Race: Philipp Grissom is here for Medicare AWV (.)    Screenings for behavioral, psychosocial and functional/safety risks, and cognitive dysfunction are all negative except as indicated below. These results, as well as other patient data from the [address] form, are documented in Flowsheets linked to this Encounter. Allergies   Allergen Reactions    Budesonide-Formoterol Fumarate      Other reaction(s): THRUSH  Other reaction(s): Other: See Comments  thrush         Prior to Visit Medications    Medication Sig Taking? Authorizing Provider   Fluticasone-Umeclidin-Vilant (TRELEGY ELLIPTA) 200-62.5-25 MCG/INH AEPB Inhale 1 puff into the lungs daily Yes Fernando Zavala MD   cholestyramine (QUESTRAN) 4 g packet Take 1 packet by mouth 2 times daily Yes Brennan Lagos MD   Blood Glucose Monitoring Suppl ([address]) w/Device KIT USE AS DIRECTED Yes Historical Provider, MD   methocarbamol (ROBAXIN) 500 MG tablet Take 1 tablet by mouth twice a day Yes Historical Provider, MD   empagliflozin (JARDIANCE) 25 MG tablet Take 25 mg by mouth daily Yes Natalie Otto MD   vitamin B-12 (CYANOCOBALAMIN) 500 MCG tablet Take 500 mcg by mouth daily Yes Historical Provider, MD   Omega-3 Fatty Acids (FISH OIL) 1000 MG CAPS Take 1 capsule by mouth 3 times daily Yes Natalie Otto MD   acetaminophen (TYLENOL) 500 MG tablet  Yes Historical Provider, MD   blood glucose monitor kit and supplies Dispense sufficient amount for indicated testing frequency plus additional to accommodate PRN testing needs. Dispense all needed supplies to include: monitor, strips, lancing device, lancets, control solutions, alcohol swabs.  Yes Natalie Otto MD   Lancets MISC Test fasting and after largest meal of the day Yes Natalie Otto MD   blood glucose monitor strips DX: diabetes mellitus. Use fasting and after largest meal of the day - Ok to substitute per insurance Yes Natalie Otto MD   spironolactone (ALDACTONE) 25 MG tablet Take 1 tablet by mouth daily Yes Audrey Spencer MD   gabapentin (NEURONTIN) 100 MG capsule Take 1 capsule by mouth 3 times daily for 30 days.  Yes Chetan Ortega MD   albuterol (PROVENTIL) (2.5 MG/3ML) 0.083% nebulizer solution Take 2.5 mg by nebulization every 6 hours as needed for Wheezing Yes Historical Provider, MD   albuterol sulfate HFA (VENTOLIN HFA) 108 (90 Base) MCG/ACT inhaler Inhale 2 puffs into the lungs every 6 hours as needed for Wheezing Yes Historical Provider, MD   torsemide (DEMADEX) 20 MG tablet Take 20 mg by mouth daily Yes Historical Provider, MD   aspirin 81 MG EC tablet Take 81 mg by mouth daily Yes Historical Provider, MD   metFORMIN (GLUCOPHAGE) 500 MG tablet Take 1,000 mg by mouth Daily with supper Yes Historical Provider, MD   montelukast (SINGULAIR) 10 MG tablet Take 10 mg by mouth nightly Yes Historical Provider, MD   pravastatin (PRAVACHOL) 20 MG tablet Take 20 mg by mouth nightly Yes Historical Provider, MD   vitamin D (CHOLECALCIFEROL) 25 MCG (1000 UT) TABS tablet Take 1,000 Units by mouth nightly Yes Historical Provider, MD         Past Medical History:   Diagnosis Date    Asthma     [facility] obstructive [facility] disease) [facility]     Depression     Diabetes mellitus ([facility] [facility]        Past Surgical History:   Procedure Laterality Date    CHOLECYSTECTOMY      COLONOSCOPY N/A 4/19/2021    COLORECTAL CANCER SCREENING with polypectomies performed by Brennan Lagos MD at [facility]         Family History   Problem Relation Age of Onset    Breast Cancer Mother     Colon Cancer Neg Hx        CareTeam (Including outside providers/suppliers regularly involved in providing care):   Patient Care Team:  Natalie Otto MD as PCP - General (Internal Medicine)  Natalie Otto MD as PCP - [facility] Empaneled Provider    Wt Readings Depression:  PHQ-2 Score: 3  PHQ-9 Total Score: 5    Severity:1-4 = minimal depression, 5-9 = mild depression, 10-14 = moderate depression, 15-19 = moderately severe depression, 20-27 = severe depression  Depression Interventions:  · Regular exercise recommended- 3-5 times per week, 30-45 minutes per session        General Health and ACP:  General  In general, how would you say your health is?: Fair  In the past 7 days, have you experienced any of the following?  New or Increased Pain, New or Increased Fatigue, Loneliness, Social Isolation, Stress or Anger?: (!) New or Increased Fatigue, Loneliness, Social Isolation, Stress  Do you get the social and emotional support that you need?: Yes  Do you have a Living Will?: (!) No  Advance Directives     Power of  Living Will ACP-Advance Directive ACP-Power of     Not on File Filed on 04/21/21 Filed Not on File      General Health Risk Interventions:  · will appoint daughter Hermes Thorpe as [address] Habits/Nutrition:  Health Habits/Nutrition  Do you exercise for at least 20 minutes 2-3 times per week?: (!) No  Have you lost any weight without trying in the past 3 months?: No  Do you eat only one meal per day?: (!) Yes  Have you seen the dentist within the past year?: (!) No  Body mass index: (!) 51.58  Health Habits/Nutrition Interventions:  · Inadequate physical activity:  patient agrees to exercise for at least 150 minutes/week     Safety:  Safety  Do you have working smoke detectors?: (!) No  Have all throw rugs been removed or fastened?: (!) No  Do you have non-slip mats or surfaces in all bathtubs/showers?: (!) No  Do all of your stairways have a railing or banister?: Yes  Are your doorways, halls and stairs free of clutter?: Yes  Do you always fasten your seatbelt when you are in a car?: (!) No  Safety Interventions:  · Home safety tips provided     Personalized Preventive Plan   Current Health Maintenance Status    There is no immunization history on file for this patient. Health Maintenance   Topic Date Due    DTaP/Tdap/Td vaccine (1 - Tdap) Never done    Shingles Vaccine (1 of 2) Never done    Pneumococcal 65+ years Vaccine (1 of 1 - PPSV23) Never done    Annual Wellness Visit (AWV)  Never done    Diabetic retinal exam  04/09/2022 (Originally 1/28/1965)    COVID-19 Vaccine (1) 06/22/2022 (Originally 1/28/1967)    Flu vaccine (Season Ended) 09/01/2021    A1C test (Diabetic or Prediabetic)  03/20/2022    Diabetic microalbuminuria test  03/30/2022    Lipid screen  04/07/2022    Breast cancer screen  04/13/2022    Diabetic foot exam  04/27/2022    Potassium monitoring  05/11/2022    Creatinine monitoring  05/11/2022    Colon cancer screen colonoscopy  04/19/2024    DEXA (modify frequency per FRAX score)  Completed    Hepatitis C screen  Completed    Hepatitis A vaccine  Aged Out    Hib vaccine  Aged Out    Meningococcal (ACWY) vaccine  Aged Out     Recommendations for dilitronics Due: see orders and patient instructions/AVS.  . Recommended screening schedule for the next 5-10 years is provided to the patient in written form: see Patient Arlene Woodall was seen today for medicare awv.     Diagnoses and all orders for this visit:    Routine general medical examination at a health care facility

## 2021-05-28 NOTE — PROGRESS NOTES
Subjective:      Patient ID: Clifford Lee is a 74 y.o. female    F/u for COPD and T2DM   HPI  Pt presents for follow up regarding COPD management. Trelegy well tolerated and COPD well controlled. Last A1c 7.5. Will repeat. Hyperkalemia resolved. Potassium will remain held and will repeat BMP. Continue spironolactone and torsemide. Past Medical History:   Diagnosis Date    Asthma     [facility]) [facility]     Depression     Diabetes mellitus ([facility]      Past Surgical History:   Procedure Laterality Date    CHOLECYSTECTOMY      COLONOSCOPY N/A 4/19/2021    COLORECTAL CANCER SCREENING with polypectomies performed by Wilton Goldberg MD at [facility] Marital status:      Spouse name: Not on file    Number of children: Not on file    Years of education: Not on file    Highest education level: Not on file   Occupational History    Not on file   Tobacco Use    Smoking status: Former Smoker     Packs/day: 1.00     Years: 40.00     Pack years: 40.00     Types: Cigarettes     Start date: 1971    Smokeless tobacco: Former User     Quit date: 2011   Vaping Use    Vaping Use: Never used   Substance and Sexual Activity    Alcohol use: Not Currently     Comment: occass    Drug use: Never    Sexual activity: Not on file   Other Topics Concern    Not on file   Social History Narrative    Not on file     Social Determinants of Health     Financial Resource Strain: Low Risk     Difficulty of Paying Living Expenses: Not hard at all   Food Insecurity: No Food Insecurity    Worried About [address] in the Last Year: Never true    [facility] in the Last Year: Never true   Transportation Needs:     Lack of Transportation (Medical):      Lack of Transportation (Non-Medical):    Physical Activity:     Days of Exercise per Week:     Minutes of Exercise per Session:    Stress:     Feeling of Stress :    Social Connections:     Frequency of Communication with Friends and Family:     Frequency of Social Gatherings with Friends and Family:     Attends [employer] Services:     Active Member of Clubs or Organizations:     Attends Club or Organization Meetings:     Marital Status:    Intimate Partner Violence:     Fear of Current or Ex-Partner:     Emotionally Abused:     Physically Abused:     Sexually Abused:      Family History   Problem Relation Age of Onset    Breast Cancer Mother     Colon Cancer Neg Hx      Allergies:  Budesonide-formoterol fumarate  Patient Active Problem List   Diagnosis    Left leg weakness    Inflammation of left sacroiliac joint ([address]    Piriformis syndrome of left side    Acute back pain with sciatica, left    Intervertebral disc stenosis of neural canal of lumbar region    Postlaminectomy syndrome, lumbar region    Adenomatous polyp of colon    Adenomatous polyp of ascending colon    Adenomatous polyp of transverse colon    Adenomatous polyp of sigmoid colon    Nicotine dependence     Current Outpatient Medications on File Prior to Visit   Medication Sig Dispense Refill    Fluticasone-Umeclidin-Vilant (TRELEGY ELLIPTA) 200-62.5-25 MCG/INH AEPB Inhale 1 puff into the lungs daily 3 each 1    cholestyramine (QUESTRAN) 4 g packet Take 1 packet by mouth 2 times daily 30 packet 2    Blood Glucose Monitoring Suppl (ONETOUCH VERIO FLEX SYSTEM) w/Device KIT USE AS DIRECTED      methocarbamol (ROBAXIN) 500 MG tablet Take 1 tablet by mouth twice a day      empagliflozin (JARDIANCE) 25 MG tablet Take 25 mg by mouth daily 90 tablet 1    vitamin B-12 (CYANOCOBALAMIN) 500 MCG tablet Take 500 mcg by mouth daily      Omega-3 Fatty Acids (FISH OIL) 1000 MG CAPS Take 1 capsule by mouth 3 times daily 270 capsule 3    acetaminophen (TYLENOL) 500 MG tablet       blood glucose monitor kit and supplies Dispense sufficient amount for indicated testing frequency plus additional to accommodate PRN testing needs. Dispense all needed supplies to include: monitor, strips, lancing device, lancets, control solutions, alcohol swabs. 1 kit 0    Lancets MISC Test fasting and after largest meal of the day 300 each 5    blood glucose monitor strips DX: diabetes mellitus. Use fasting and after largest meal of the day - Ok to substitute per insurance 100 strip 5    spironolactone (ALDACTONE) 25 MG tablet Take 1 tablet by mouth daily 90 tablet 1    gabapentin (NEURONTIN) 100 MG capsule Take 1 capsule by mouth 3 times daily for 30 days. 90 capsule 0    albuterol (PROVENTIL) (2.5 MG/3ML) 0.083% nebulizer solution Take 2.5 mg by nebulization every 6 hours as needed for Wheezing      albuterol sulfate HFA (VENTOLIN HFA) 108 (90 Base) MCG/ACT inhaler Inhale 2 puffs into the lungs every 6 hours as needed for Wheezing      torsemide (DEMADEX) 20 MG tablet Take 20 mg by mouth daily      aspirin 81 MG EC tablet Take 81 mg by mouth daily      metFORMIN (GLUCOPHAGE) 500 MG tablet Take 1,000 mg by mouth Daily with supper      montelukast (SINGULAIR) 10 MG tablet Take 10 mg by mouth nightly      pravastatin (PRAVACHOL) 20 MG tablet Take 20 mg by mouth nightly      vitamin D (CHOLECALCIFEROL) 25 MCG (1000 UT) TABS tablet Take 1,000 Units by mouth nightly       No current facility-administered medications on file prior to visit. Review of Systems   HENT: Negative for congestion, ear discharge and ear pain. Respiratory: Negative for cough, shortness of breath and wheezing. Cardiovascular: Negative for chest pain. Gastrointestinal: Negative for abdominal pain, diarrhea, nausea and vomiting. Endocrine: Negative for cold intolerance and heat intolerance. Genitourinary: Negative for dysuria and frequency. Neurological: Negative for dizziness and light-headedness. Psychiatric/Behavioral: Negative for dysphoric mood. The patient is not nervous/anxious. Objective:    There were no vitals taken for this visit. Physical Exam  Constitutional:       General: She is not in acute distress. Appearance: She is not diaphoretic. Cardiovascular:      Rate and Rhythm: Normal rate and regular rhythm. Pulses: Normal pulses. Heart sounds: Normal heart sounds, S1 normal and S2 normal.   Pulmonary:      Effort: Pulmonary effort is normal. No respiratory distress. Breath sounds: Normal breath sounds. No wheezing or rales. Chest:      Chest wall: No tenderness. Abdominal:      General: Bowel sounds are normal.      Tenderness: There is no abdominal tenderness. Neurological:      Mental Status: She is alert. Assessment:       Diagnosis Orders   1. Chronic obstructive pulmonary disease, unspecified COPD type ([facility] Controlled    2. Type 2 diabetes mellitus without complication, without long-term current use of insulin ([facility]) Stable Hemoglobin A1C   3. Hyperkalemia  Comprehensive Metabolic Panel    resolved. Will monitor potassium but hold replacement     Plan:      Orders Placed This Encounter   Procedures    Hemoglobin A1C     Standing Status:   Future     Standing Expiration Date:   5/28/2022    Comprehensive Metabolic Panel     Standing Status:   Future     Standing Expiration Date:   5/28/2022     No orders of the defined types were placed in this encounter. No follow-ups on file.

## 2021-05-28 NOTE — PATIENT INSTRUCTIONS
Personalized Preventive Plan for Megan Guadarrama - 5/28/2021  Medicare offers a range of preventive health benefits. Some of the tests and screenings are paid in full while other may be subject to a deductible, co-insurance, and/or copay. Some of these benefits include a comprehensive review of your medical history including lifestyle, illnesses that may run in your family, and various assessments and screenings as appropriate. After reviewing your medical record and screening and assessments performed today your provider may have ordered immunizations, labs, imaging, and/or referrals for you. A list of these orders (if applicable) as well as your Preventive Care list are included within your After Visit Summary for your review. Other Preventive Recommendations:    · A preventive eye exam performed by an eye specialist is recommended every 1-2 years to screen for glaucoma; cataracts, macular degeneration, and other eye disorders. · A preventive dental visit is recommended every 6 months. · Try to get at least 150 minutes of exercise per week or 10,000 steps per day on a pedometer . · Order or download the FREE \"Exercise & Physical Activity: Your Everyday Guide\" from [employer]. Call 2-755.849.3690 or search [employer] online. · You need 5083-9837 mg of calcium and 5470-2495 IU of vitamin D per day. It is possible to meet your calcium requirement with diet alone, but a vitamin D supplement is usually necessary to meet this goal.  · When exposed to the sun, use a sunscreen that protects against both UVA and UVB radiation with an SPF of 30 or greater. Reapply every 2 to 3 hours or after sweating, drying off with a towel, or swimming. · Always wear a seat belt when traveling in a car. Always wear a helmet when riding a bicycle or motorcycle.

## 2021-06-07 ENCOUNTER — HOSPITAL ENCOUNTER (OUTPATIENT)
Dept: SLEEP CENTER | Age: 66
Discharge: HOME | End: 2021-06-09
Payer: MEDICARE

## 2021-06-07 PROCEDURE — 95810 POLYSOM 6/> YRS 4/> PARAM: CPT

## 2021-06-10 NOTE — PROGRESS NOTES
[address]                                  Ph: [phone]  Fax: [phone]     []? Certification  []? Recertification      []? Plan of Care  []? Progress Note [x]? Discharge                            To:  Dr. Queenie Fu                From: Arnold Herrera, PT, DPT  Patient: Nitin Dillard     : 1955  Diagnosis: Left leg weakness, Lumbar radiculopathy, Acute back pain with LT sciatica, Myalgia     Date: 21  Treatment Diagnosis: LBP     Plan of Care/Certification Expiration Date: 21  Progress Report Period from:  2021  to 2021     Total # of Visits to Date: 4   No Show: 0    Canceled Appointment: 1      OBJECTIVE:   Short Term Goals - Time Frame for Short term goals: 2 weeks    Goals Current/Discharge status  Met   Short term goal 1: Independent with HEP. HEP provided; pt reported independence [x]? yes  []? no      Long Term Goals - Time Frame for Long term goals : 5 weeks  Goals Current/ Discharge status Met   Long term goal 1: Improve lumbar ROM and dung LE flexibility to [facility] to increase ease with mobility and ADLs with decreased pain. No pain per subjective report []? yes  []? no   Long term goal 2: Pt will report pain </= 3/10 with all activity including standing to wash dishes. No pain per subjective report  []? yes  []? no   Long term goal 3: Improve dung LE strength to >/= 4+/5 to improve stability with standing and walking. Unable to assess d/t unexpected D/C []? yes  []? no   Long term goal 4: Oswestry </= 15/50 to improve pt's QOL. Unable to assess d/t unexpected D/C []? yes  []? no         Body structures, Functions, Activity limitations: Decreased functional mobility , Decreased ROM, Decreased strength, Increased pain  Assessment: Pt completed 4 therapy visits to address LBP and weakness. Throughout duration of tx, pt reported LBP had resolved.  Pt was provided a

## 2021-06-22 ENCOUNTER — HOSPITAL ENCOUNTER (OUTPATIENT)
Dept: ULTRASOUND IMAGING | Age: 66
Discharge: HOME | End: 2021-06-24
Payer: MEDICARE

## 2021-06-22 DIAGNOSIS — N18.30 STAGE 3 CHRONIC KIDNEY DISEASE, UNSPECIFIED WHETHER STAGE 3A OR 3B CKD (HCC): ICD-10-CM

## 2021-06-22 PROCEDURE — 76775 US EXAM ABDO BACK WALL LIM: CPT

## 2021-06-23 PROCEDURE — 95810 POLYSOM 6/> YRS 4/> PARAM: CPT | Performed by: INTERNAL MEDICINE

## 2021-06-24 DIAGNOSIS — G47.33 OSA (OBSTRUCTIVE SLEEP APNEA): ICD-10-CM

## 2021-07-01 ENCOUNTER — OFFICE VISIT (OUTPATIENT)
Dept: PULMONOLOGY | Age: 66
End: 2021-07-01
Payer: MEDICARE

## 2021-07-01 VITALS
RESPIRATION RATE: 18 BRPM | SYSTOLIC BLOOD PRESSURE: 122 MMHG | HEIGHT: 66 IN | HEART RATE: 80 BPM | TEMPERATURE: 96 F | BODY MASS INDEX: 47.09 KG/M2 | WEIGHT: 293 LBS | OXYGEN SATURATION: 98 % | DIASTOLIC BLOOD PRESSURE: 70 MMHG

## 2021-07-01 DIAGNOSIS — J96.11 CHRONIC RESPIRATORY FAILURE WITH HYPOXIA (HCC): ICD-10-CM

## 2021-07-01 DIAGNOSIS — J44.9 CHRONIC OBSTRUCTIVE PULMONARY DISEASE, UNSPECIFIED COPD TYPE (HCC): ICD-10-CM

## 2021-07-01 DIAGNOSIS — Z87.891 HISTORY OF SMOKING: ICD-10-CM

## 2021-07-01 DIAGNOSIS — E66.01 CLASS 3 SEVERE OBESITY DUE TO EXCESS CALORIES WITH SERIOUS COMORBIDITY AND BODY MASS INDEX (BMI) OF 50.0 TO 59.9 IN ADULT (HCC): ICD-10-CM

## 2021-07-01 DIAGNOSIS — G47.33 OSA (OBSTRUCTIVE SLEEP APNEA): Primary | ICD-10-CM

## 2021-07-01 DIAGNOSIS — I51.89 DIASTOLIC DYSFUNCTION: ICD-10-CM

## 2021-07-01 PROCEDURE — 99214 OFFICE O/P EST MOD 30 MIN: CPT | Performed by: INTERNAL MEDICINE

## 2021-07-01 NOTE — PROGRESS NOTES
Subjective:     Cesar Narayanan is a 74 y.o. female who complains today of:     Chief Complaint   Patient presents with    Follow-up     6 Week F/U for COPD    Results     Sleep Study, PFT, CT Lung Screening and Labs       HPI  Patient presents for COPD follow-up    She is doing much better, shortness of breath improved, she is able to do more during the day, no chest pain, minimal coughing, productive of yellow phlegm, no fever or chills, no nasal congestion or postnasal drip, no heartburn, no lower extremity edema, she is currently on 3 to 4 L O2 continuously. Weight is stable, no skin rash, no joint swelling or pain.         Allergies:  Budesonide-formoterol fumarate  Past Medical History:   Diagnosis Date    Asthma     COPD (chronic obstructive pulmonary disease) ([facility]     Depression     Diabetes mellitus ([facility]      Past Surgical History:   Procedure Laterality Date    CHOLECYSTECTOMY      COLONOSCOPY N/A 4/19/2021    COLORECTAL CANCER SCREENING with polypectomies performed by Alban Guido MD at [facility]     Family History   Problem Relation Age of Onset    Breast Cancer Mother     Colon Cancer Neg Hx      Social History     Socioeconomic History    Marital status:      Spouse name: Not on file    Number of children: Not on file    Years of education: Not on file    Highest education level: Not on file   Occupational History    Not on file   Tobacco Use    Smoking status: Former Smoker     Packs/day: 1.00     Years: 40.00     Pack years: 40.00     Types: Cigarettes     Start date: 1971    Smokeless tobacco: Former User     Quit date: 2011   Vaping Use    Vaping Use: Never used   Substance and Sexual Activity    Alcohol use: Not Currently     Comment: occass    Drug use: Never    Sexual activity: Not on file   Other Topics Concern    Not on file   Social History Narrative    Not on file     Social Determinants of Health     Financial Resource Strain[facility] Difficulty of Paying Living Expenses: Not hard at all   Food Insecurity: No Food Insecurity    Worried About Running Out of Food in the Last Year: Never true    Ran Out of Food in the Last Year: Never true   Transportation Needs:     Lack of Transportation (Medical):  Lack of Transportation (Non-Medical):    Physical Activity:     Days of Exercise per Week:     Minutes of Exercise per Session:    Stress:     Feeling of Stress :    Social Connections:     Frequency of Communication with Friends and Family:     Frequency of Social Gatherings with Friends and Family:     Attends Druze Services:     Active Member of Clubs or Organizations:     Attends Club or Organization Meetings:     Marital Status:    Intimate Partner Violence:     Fear of Current or Ex-Partner:     Emotionally Abused:     Physically Abused:     Sexually Abused:          Review of Systems      ROS: 10 organs review of system is done including general, psychological, ENT, hematological, endocrine, respiratory, cardiovascular, gastrointestinal,musculoskeletal, neurological,  allergy and Immunology is done and is otherwise negative.     Current Outpatient Medications   Medication Sig Dispense Refill    Fluticasone-Umeclidin-Vilant (TRELEGY ELLIPTA) 200-62.5-25 MCG/INH AEPB Inhale 1 puff into the lungs daily 3 each 1    cholestyramine (QUESTRAN) 4 g packet Take 1 packet by mouth 2 times daily 30 packet 2    Blood Glucose Monitoring Suppl (ONETOUCH VERIO FLEX SYSTEM) w/Device KIT USE AS DIRECTED      methocarbamol (ROBAXIN) 500 MG tablet Take 1 tablet by mouth twice a day      empagliflozin (JARDIANCE) 25 MG tablet Take 25 mg by mouth daily 90 tablet 1    vitamin B-12 (CYANOCOBALAMIN) 500 MCG tablet Take 500 mcg by mouth daily      Omega-3 Fatty Acids (FISH OIL) 1000 MG CAPS Take 1 capsule by mouth 3 times daily 270 capsule 3    acetaminophen (TYLENOL) 500 MG tablet       blood glucose monitor kit and supplies Dispense sufficient amount for indicated testing frequency plus additional to accommodate PRN testing needs. Dispense all needed supplies to include: monitor, strips, lancing device, lancets, control solutions, alcohol swabs. 1 kit 0    Lancets MISC Test fasting and after largest meal of the day 300 each 5    blood glucose monitor strips DX: diabetes mellitus. Use fasting and after largest meal of the day - Ok to substitute per insurance 100 strip 5    spironolactone (ALDACTONE) 25 MG tablet Take 1 tablet by mouth daily 90 tablet 1    albuterol (PROVENTIL) (2.5 MG/3ML) 0.083% nebulizer solution Take 2.5 mg by nebulization every 6 hours as needed for Wheezing      albuterol sulfate HFA (VENTOLIN HFA) 108 (90 Base) MCG/ACT inhaler Inhale 2 puffs into the lungs every 6 hours as needed for Wheezing      torsemide (DEMADEX) 20 MG tablet Take 20 mg by mouth daily      aspirin 81 MG EC tablet Take 81 mg by mouth daily      metFORMIN (GLUCOPHAGE) 500 MG tablet Take 1,000 mg by mouth Daily with supper      montelukast (SINGULAIR) 10 MG tablet Take 10 mg by mouth nightly      pravastatin (PRAVACHOL) 20 MG tablet Take 20 mg by mouth nightly      vitamin D (CHOLECALCIFEROL) 25 MCG (1000 UT) TABS tablet Take 1,000 Units by mouth nightly      gabapentin (NEURONTIN) 100 MG capsule Take 1 capsule by mouth 3 times daily for 30 days. 90 capsule 0     No current facility-administered medications for this visit. Objective:     Vitals:    07/01/21 1506   BP: 122/70   Site: Right Lower Arm   Position: Sitting   Cuff Size: Medium Adult   Pulse: 80   Resp: 18   Temp: 96 °F (35.6 °C)   TempSrc: Tympanic   SpO2: 98%   Weight: (!) 312 lb 6.4 oz (141.7 kg)   Height: 5' 6\" (1.676 m)         Physical Exam  Constitutional:       General: She is not in acute distress. Appearance: She is well-developed. She is not diaphoretic. HENT:      Head: Normocephalic and atraumatic.    Eyes:      Conjunctiva/sclera: Conjunctivae normal. Pupils: Pupils are equal, round, and reactive to light. Cardiovascular:      Rate and Rhythm: Normal rate and regular rhythm. Heart sounds: No murmur heard. No friction rub. No gallop. Pulmonary:      Effort: Pulmonary effort is normal. No respiratory distress. Breath sounds: Normal breath sounds. No wheezing or rales. Chest:      Chest wall: No tenderness. Abdominal:      General: There is no distension. Palpations: Abdomen is soft. Tenderness: There is no abdominal tenderness. There is no rebound. Musculoskeletal:         General: No tenderness. Cervical back: Normal range of motion and neck supple. Right lower leg: No edema. Left lower leg: No edema. Lymphadenopathy:      Cervical: No cervical adenopathy. Skin:     General: Skin is warm and dry. Findings: No erythema. Neurological:      Mental Status: She is alert and oriented to person, place, and time. Psychiatric:         Judgment: Judgment normal.          Imaging studies reviewed by me CT lung cancer screening May 25, 2021, shows no mass or nodule  Lab results reviewed in chart  PFT May 2021, shows FEV1 77%, FEV1/FVC 0.62, with significant response to bronchodilator, air trapping and hyperinflation, and normal DLCO  ECHO: April 2021, shows EF 07% with diastolic dysfunction  Sleep study shows AHI 14.5, desaturation for 7.9 minutes  Assessment and Plan       Diagnosis Orders   1. FROYLAN (obstructive sleep apnea)  Sleep Study with PAP Titration   2. Chronic obstructive pulmonary disease, unspecified COPD type ([facility]     3. Class 3 severe obesity due to excess calories with serious comorbidity and body mass index (BMI) of 50.0 to 59.9 in adult ([facility] [address]     4. Chronic respiratory failure with hypoxia (HCC)     5. Diastolic dysfunction     6. History of smoking       · FROYLAN, mild however close to moderate, with hypoxia while asleep, will obtain CPAP titration study start CPAP treatment after that.   · COPD mild however patient has chronic hypoxic respiratory failure, will continue treatment with Trelegy Ellipta, she feels much improved, continue as needed albuterol, yearly flu shot  · Weight loss is recommended  · Discussed with the patient titrate O2 target sat 90 to 10%  · Diastolic dysfunction, continue cardioprotective medications and avoid volume overload  · History of smoking, will plan CT lung cancer screening yearly      Orders Placed This Encounter   Procedures    Sleep Study with PAP Titration     Standing Status:   Future     Standing Expiration Date:   1/1/2023     Order Specific Question:   Sleep Study Titration Type     Answer:   Split Night Study (Baseline followed by PAP Titration)     Order Specific Question:   Location For Sleep Study     Answer:   [facility]     Order Specific Question:   Select Sleep Lab Location     Answer:   [facility]     No orders of the defined types were placed in this encounter. Discussed with patient the importance of exercise and weight control and  overall health and well-being. Reviewed with the patient: current clinical status, medications, activities and diet. Side effects, adverse effects of the medication prescribed today, as well as treatment plan and result expectations have been discussed with the patient who expresses understanding and desires to proceed. Return in about 3 months (around 10/1/2021).       Charles Patel MD

## 2021-07-02 ENCOUNTER — OFFICE VISIT (OUTPATIENT)
Dept: GASTROENTEROLOGY | Age: 66
End: 2021-07-02
Payer: MEDICARE

## 2021-07-02 VITALS
WEIGHT: 293 LBS | OXYGEN SATURATION: 91 % | RESPIRATION RATE: 16 BRPM | BODY MASS INDEX: 47.09 KG/M2 | HEIGHT: 66 IN | HEART RATE: 86 BPM

## 2021-07-02 DIAGNOSIS — D12.4 ADENOMATOUS POLYP OF DESCENDING COLON: Primary | ICD-10-CM

## 2021-07-02 PROCEDURE — 99212 OFFICE O/P EST SF 10 MIN: CPT | Performed by: SPECIALIST

## 2021-07-02 ASSESSMENT — ENCOUNTER SYMPTOMS
VOMITING: 0
BLOOD IN STOOL: 0
GASTROINTESTINAL NEGATIVE: 1
RECTAL PAIN: 0
NAUSEA: 0
DIARRHEA: 0
ANAL BLEEDING: 0
SHORTNESS OF BREATH: 1
ABDOMINAL DISTENTION: 0
CONSTIPATION: 0
EYES NEGATIVE: 1
ABDOMINAL PAIN: 0

## 2021-07-02 NOTE — PROGRESS NOTES
Gastroenterology Clinic Follow up Visit    [facility]  [id number]  Chief Complaint   Patient presents with    Follow-up       HPI and A/P at last visit summarized below:  Patient is here for follow-up. Colonoscopy showed multiple colon polyps diverticulosis and internal hemorrhoid. Biopsy of the polyp showed hyperplastic and adenomatous polyp, has 1-3 bowel movements a day depending on what she eats and also is occurs usually after a meal.  Patient had this symptoms for many years. No rectal bleeding. Patient has been using cholestyramine. Review of Systems   Constitutional: Negative. HENT: Negative. Eyes: Negative. Respiratory: Positive for shortness of breath. Cardiovascular: Negative. Gastrointestinal: Negative. Negative for abdominal distention, abdominal pain, anal bleeding, blood in stool, constipation, diarrhea, nausea, rectal pain and vomiting. Endocrine: Negative. Genitourinary: Negative. Musculoskeletal: Negative. Skin: Negative. Allergic/Immunologic: Negative for food allergies. Neurological: Negative. Hematological: Negative. Psychiatric/Behavioral: Negative. Past medical history, past surgical history, medication list, social and familyhistory reviewed    Pulse 86, resp. rate 16, height 5' 6\" (1.676 m), weight (!) 310 lb (140.6 kg), SpO2 91 %, not currently breastfeeding. Physical Exam  Constitutional:       Appearance: She is well-developed. HENT:      Head: Normocephalic and atraumatic. Eyes:      Conjunctiva/sclera: Conjunctivae normal.      Pupils: Pupils are equal, round, and reactive to light. Cardiovascular:      Rate and Rhythm: Normal rate. Pulmonary:      Effort: Pulmonary effort is normal.   Abdominal:      General: Bowel sounds are normal.      Palpations: Abdomen is soft. Comments: Soft, obese, nontender no palpable mass   Musculoskeletal:         General: Normal range of motion.       Cervical back: Normal range of motion. Skin:     General: Skin is warm. Neurological:      Mental Status: She is alert. Laboratory, Pathology, Radiology reviewed in detail with relevantimportant investigations summarized below:    Recent Labs     06/02/21  1029   WBC 10.4   HGB 14.0   HCT 42.9   MCV 92.9   *     Lab Results   Component Value Date    ALT 43 (H) 05/11/2021    AST 49 (H) 05/11/2021    ALKPHOS 78 05/11/2021    BILITOT 0.7 05/11/2021     US RETROPERITONEAL LIMITED    Result Date: 6/22/2021  RENAL SONOGRAM REASON FOR EXAMINATION: Renal insufficiency, stage III chronic kidney disease. COMPARISONS:  CT abdomen: 5/6/2021 FINDINGS: Biplanar images were obtained. The right kidney measures 11.2 x 4.6 x 5.2 cm. The left kidney measures 11.5 x 5.9 x 5 cm. No renal mass or calculus visualized. The echogenicity of the renal cortex is less than the adjacent liver and spleen. Right kidney: The right renal cortex measures 0.9 cm in diameter. Arising from the inferior pole there is a 2.5 x 2.2 x 2.4 cm hypoechoic lesion, as seen on CT scan. This is difficult to visualize due to body habitus. This is probably a cyst, but cannot BE certain on this exam. Left kidney:  The left renal cortex measures 0.9 cm in diameter. Arising from the inferior pole there is a 2 x 2.2 x 2 cm hypoechoic lesion, as seen on CT. This is difficult to visualize due to body habitus. This is probably a cyst, but cannot BE certain  on this exam. No other renal lesion visualized. STUDY IS LIMITED BY THE PATIENT'S BODY HABITUS. KIDNEYS ARE NORMAL IN SIZE. NO HYDRONEPHROSIS. HYPOECHOIC LESION ARISING FROM THE INFERIOR POLE OF EACH KIDNEY, PROBABLE CYSTS, BUT CANNOT BE CERTAIN ON THIS EXAM.        Endoscopic investigations:     Assessment and Plan:  Bishnu Loma Mar 74 y.o. female for follow up. History of colon polyps, surveillance colonoscopy after 3 years   Diagnosis Orders   1. Adenomatous polyp of descending colon         No follow-ups on file.     Vlad Lopez Radha Bui MD   [facility]    Please note this report has been partially produced using speech recognitionsoftware  and may cause contain errors related to that system including grammar, punctuation and spelling as well as words andphrases that may seem inappropriate. If there are questions or concerns please feel free to contact me to clarify.

## 2021-08-05 ENCOUNTER — HOSPITAL ENCOUNTER (OUTPATIENT)
Dept: SLEEP CENTER | Age: 66
Discharge: HOME | End: 2021-08-07
Payer: MEDICARE

## 2021-08-05 PROCEDURE — 95811 POLYSOM 6/>YRS CPAP 4/> PARM: CPT

## 2021-08-08 PROCEDURE — 95811 POLYSOM 6/>YRS CPAP 4/> PARM: CPT | Performed by: INTERNAL MEDICINE

## 2021-08-11 ENCOUNTER — TELEPHONE (OUTPATIENT)
Dept: PRIMARY CARE CLINIC | Age: 66
End: 2021-08-11

## 2021-08-11 NOTE — TELEPHONE ENCOUNTER
----- Message from Latanya Nino sent at 8/10/2021  3:56 PM EDT -----  Subject: Message to Provider    QUESTIONS  Information for Provider? Patient need for Dr Jaime Alcazar to call in new   prescription for empagliflozin (JARDIANCE) 25 MG tablet due to price went   from $47 to $167 and she cant afford that price. [facility]   [phone]  ---------------------------------------------------------------------------  --------------  Negrito Ta INFO  What is the best way for the office to contact you? OK to leave message on   voicemail  Preferred Call Back Phone Number? [phone]  ---------------------------------------------------------------------------  --------------  SCRIPT ANSWERS  Relationship to Patient?  Self

## 2021-08-12 ENCOUNTER — TELEPHONE (OUTPATIENT)
Dept: PRIMARY CARE CLINIC | Age: 66
End: 2021-08-12

## 2021-08-12 NOTE — TELEPHONE ENCOUNTER
Patient returned call, states she will check with her insurance company and return our call on Friday to see what they say on the medications.

## 2021-09-24 ENCOUNTER — OFFICE VISIT (OUTPATIENT)
Dept: PRIMARY CARE CLINIC | Age: 66
End: 2021-09-24
Payer: MEDICARE

## 2021-09-24 VITALS
BODY MASS INDEX: 47.09 KG/M2 | HEART RATE: 95 BPM | WEIGHT: 293 LBS | OXYGEN SATURATION: 98 % | SYSTOLIC BLOOD PRESSURE: 122 MMHG | HEIGHT: 66 IN | TEMPERATURE: 96.9 F | DIASTOLIC BLOOD PRESSURE: 66 MMHG | RESPIRATION RATE: 18 BRPM

## 2021-09-24 DIAGNOSIS — G47.33 OSA (OBSTRUCTIVE SLEEP APNEA): ICD-10-CM

## 2021-09-24 DIAGNOSIS — M54.16 LUMBAR RADICULOPATHY: ICD-10-CM

## 2021-09-24 DIAGNOSIS — L82.1 SEBORRHEIC KERATOSES: ICD-10-CM

## 2021-09-24 DIAGNOSIS — H81.10 BENIGN PAROXYSMAL POSITIONAL VERTIGO, UNSPECIFIED LATERALITY: ICD-10-CM

## 2021-09-24 DIAGNOSIS — L30.4 INTERTRIGO: ICD-10-CM

## 2021-09-24 DIAGNOSIS — Z00.00 PREVENTATIVE HEALTH CARE: ICD-10-CM

## 2021-09-24 DIAGNOSIS — G47.19 EXCESSIVE DAYTIME SLEEPINESS: Primary | ICD-10-CM

## 2021-09-24 DIAGNOSIS — R44.3 HALLUCINATIONS: ICD-10-CM

## 2021-09-24 DIAGNOSIS — E11.9 TYPE 2 DIABETES MELLITUS WITHOUT COMPLICATION, WITHOUT LONG-TERM CURRENT USE OF INSULIN (HCC): ICD-10-CM

## 2021-09-24 PROCEDURE — 90732 PPSV23 VACC 2 YRS+ SUBQ/IM: CPT | Performed by: INTERNAL MEDICINE

## 2021-09-24 PROCEDURE — 99214 OFFICE O/P EST MOD 30 MIN: CPT | Performed by: INTERNAL MEDICINE

## 2021-09-24 PROCEDURE — G0008 ADMIN INFLUENZA VIRUS VAC: HCPCS | Performed by: INTERNAL MEDICINE

## 2021-09-24 PROCEDURE — 3051F HG A1C>EQUAL 7.0%<8.0%: CPT | Performed by: INTERNAL MEDICINE

## 2021-09-24 PROCEDURE — G0009 ADMIN PNEUMOCOCCAL VACCINE: HCPCS | Performed by: INTERNAL MEDICINE

## 2021-09-24 PROCEDURE — 90694 VACC AIIV4 NO PRSRV 0.5ML IM: CPT | Performed by: INTERNAL MEDICINE

## 2021-09-24 RX ORDER — MECLIZINE HYDROCHLORIDE 25 MG/1
25 TABLET ORAL 3 TIMES DAILY PRN
Qty: 30 TABLET | Refills: 0 | Status: SHIPPED | OUTPATIENT
Start: 2021-09-24 | End: 2022-10-17 | Stop reason: SDUPTHER

## 2021-09-24 RX ORDER — DULOXETIN HYDROCHLORIDE 60 MG/1
CAPSULE, DELAYED RELEASE ORAL
Qty: 60 CAPSULE | Refills: 3 | Status: SHIPPED | OUTPATIENT
Start: 2021-09-24 | End: 2021-11-01 | Stop reason: SDUPTHER

## 2021-09-24 RX ORDER — SPIRONOLACTONE 25 MG/1
25 TABLET ORAL DAILY
Qty: 90 TABLET | Refills: 1 | Status: SHIPPED | OUTPATIENT
Start: 2021-09-24 | End: 2022-04-27 | Stop reason: SDUPTHER

## 2021-09-24 RX ORDER — GABAPENTIN 100 MG/1
100 CAPSULE ORAL 3 TIMES DAILY
Qty: 90 CAPSULE | Refills: 0 | Status: SHIPPED | OUTPATIENT
Start: 2021-09-24 | End: 2021-12-13 | Stop reason: SDUPTHER

## 2021-09-24 RX ORDER — DULOXETIN HYDROCHLORIDE 60 MG/1
CAPSULE, DELAYED RELEASE ORAL
COMMUNITY
Start: 2021-07-30 | End: 2021-09-24 | Stop reason: SDUPTHER

## 2021-09-24 RX ORDER — METFORMIN HYDROCHLORIDE 500 MG/1
TABLET, EXTENDED RELEASE ORAL
COMMUNITY
Start: 2021-09-14 | End: 2021-09-24 | Stop reason: SDUPTHER

## 2021-09-24 RX ORDER — KETOCONAZOLE 20 MG/G
CREAM TOPICAL
Qty: 1 EACH | Refills: 3 | Status: SHIPPED | OUTPATIENT
Start: 2021-09-24 | End: 2022-06-09

## 2021-09-24 ASSESSMENT — ENCOUNTER SYMPTOMS
SHORTNESS OF BREATH: 0
COUGH: 0
DIARRHEA: 0
NAUSEA: 0
VOMITING: 0
COLOR CHANGE: 1
WHEEZING: 0
ABDOMINAL PAIN: 0

## 2021-09-24 NOTE — PROGRESS NOTES
Subjective:      Patient ID: Yuriy Berman is a 74 y.o. female    Dizziness x 4 weeks   Hearing voices x 4 weeks   HPI   Pt presents with 4 weeks of room spinning sensation upon tuning in bed. Occasional ear ringing but no hearing loss. No fever or chills. Assoc sinus congestion and left frontal headache. COPD well controlled on Trelegy. FROYLAN  On CPAP. Daytime sleepiness persists. Attests to hallucinations between sleep and wakefulness. NO sleep paralysis. Tolerating gabapentin and duloxetine well. No suicidal ideations. CT lung showed no nodules. Rash x 6 months  Rash is under both breasts, chest and abd wall. Assoc itch but no swelling or pain. NO new skin care products.     Hemoglobin A1C (%)   Date Value   03/20/2021 7.5 (H)     Past Medical History:   Diagnosis Date    Asthma     [facility] disease) ([facility] [address]     Depression     Diabetes mellitus ([facility] [address] [facility]      Past Surgical History:   Procedure Laterality Date    CHOLECYSTECTOMY      COLONOSCOPY N/A 4/19/2021    COLORECTAL CANCER SCREENING with polypectomies performed by Alger Soulier, MD at [address] [place] [address] Marital status:      Spouse name: Not on file    Number of children: Not on file    Years of education: Not on file    Highest education level: Not on file   Occupational History    Not on file   Tobacco Use    Smoking status: Former Smoker     Packs/day: 1.00     Years: 40.00     Pack years: 40.00     Types: Cigarettes     Start date: 24     Quit date: 2011     Years since quitting: 10.7    Smokeless tobacco: Former User     Quit date: 2011   Vaping Use    Vaping Use: Never used   Substance and Sexual Activity    Alcohol use: Not Currently     Comment: occass    Drug use: Never    Sexual activity: Not on file   Other Topics Concern    Not on file   Social History Narrative    Not on file     Social Determinants of Health     Financial Resource Strain: Low Risk     Difficulty of Paying Living Expenses: Not hard at all   Food Insecurity: No Food Insecurity    Worried About Running Out of Food in the Last Year: Never true    Negrito of Food in the Last Year: Never true   Transportation Needs:     Lack of Transportation (Medical):      Lack of Transportation (Non-Medical):    Physical Activity:     Days of Exercise per Week:     Minutes of Exercise per Session:    Stress:     Feeling of Stress :    Social Connections:     Frequency of Communication with Friends and Family:     Frequency of Social Gatherings with Friends and Family:     Attends Cheondoism Services:     Active Member of Clubs or Organizations:     Attends Club or Organization Meetings:     Marital Status:    Intimate Partner Violence:     Fear of Current or Ex-Partner:     Emotionally Abused:     Physically Abused:     Sexually Abused:      Family History   Problem Relation Age of Onset    Breast Cancer Mother     Colon Cancer Neg Hx      Allergies:  Budesonide-formoterol fumarate  Patient Active Problem List   Diagnosis    Left leg weakness    Inflammation of left sacroiliac joint ([facility]    Piriformis syndrome of left side    Acute back pain with sciatica, left    Intervertebral disc stenosis of neural canal of lumbar region    Postlaminectomy syndrome, lumbar region    Adenomatous polyp of colon    Adenomatous polyp of ascending colon    Adenomatous polyp of transverse colon    Adenomatous polyp of sigmoid colon    Nicotine dependence    FROYLAN (obstructive sleep apnea)     Current Outpatient Medications on File Prior to Visit   Medication Sig Dispense Refill    Fluticasone-Umeclidin-Vilant (TRELEGY ELLIPTA) 200-62.5-25 MCG/INH AEPB Inhale 1 puff into the lungs daily 3 each 1    cholestyramine (QUESTRAN) 4 g packet Take 1 packet by mouth 2 times daily 30 packet 2    Blood Glucose Monitoring Suppl (ONETOUCH VERIO FLEX SYSTEM) w/Device KIT USE AS DIRECTED      Gastrointestinal: Negative for abdominal pain, diarrhea, nausea and vomiting. Endocrine: Negative for cold intolerance and heat intolerance. Genitourinary: Negative for dysuria and frequency. Skin: Positive for color change and rash. Neurological: Negative for dizziness, speech difficulty, light-headedness and headaches. Psychiatric/Behavioral: Positive for hallucinations and sleep disturbance. Negative for dysphoric mood and suicidal ideas. The patient is not nervous/anxious. Objective:   /66   Pulse 95   Temp 96.9 °F (36.1 °C)   Resp 18   Ht 5' 6\" (1.676 m)   Wt (!) 314 lb 6.4 oz (142.6 kg)   SpO2 98%   BMI 50.75 kg/m²     Physical Exam  Constitutional:       General: She is not in acute distress. Appearance: She is not diaphoretic. Cardiovascular:      Rate and Rhythm: Normal rate and regular rhythm. Pulses: Normal pulses. Heart sounds: Normal heart sounds, S1 normal and S2 normal.   Pulmonary:      Effort: Pulmonary effort is normal. No respiratory distress. Breath sounds: Normal breath sounds. No wheezing or rales. Chest:      Chest wall: No tenderness. Abdominal:      Tenderness: There is no abdominal tenderness. Skin:     Comments: Anterior chest wall and abd wall covered with varied sized hyperpigmented sessile hypertrophic lesions  Erythematous inframammary folds b/l     Neurological:      General: No focal deficit present. Mental Status: She is alert. Cranial Nerves: No cranial nerve deficit. Psychiatric:         Mood and Affect: Mood normal.       Assessment:       Diagnosis Orders   1. Excessive daytime sleepiness  Darci Morin MD, Neurology, [place]    ? ? narcolepsy   2. Type 2 diabetes mellitus without complication, without long-term current use of insulin ([facility])      await repeat A1c   3. Benign paroxysmal positional vertigo, unspecified laterality  meclizine (ANTIVERT) 25 MG tablet   4. FROYLAN (obstructive sleep apnea)     5. Hallucinations  [place] Varghese Kim MD, Neurology, [place]   6. Seborrheic keratoses  [facility] - Renuka Frank MD, Dermatology, [place]   7. Intertrigo  ketoconazole (NIZORAL) 2 % cream    triamcinolone (KENALOG) 0.1 % ointment   8. Preventative health care  Pneumococcal polysaccharide vaccine 23-valent greater than or equal to 1yo subcutaneous/IM    INFLUENZA, QUADV, ADJUVANTED, 65 YRS =, IM, PF, PREFILL SYR, 0.5ML (FLUAD)   9. Lumbar radiculopathy  DULoxetine (CYMBALTA) 60 MG extended release capsule    gabapentin (NEURONTIN) 100 MG capsule     Plan:      Orders Placed This Encounter   Procedures    Pneumococcal polysaccharide vaccine 23-valent greater than or equal to 1yo subcutaneous/IM    INFLUENZA, QUADV, ADJUVANTED, 65 YRS =, IM, PF, PREFILL SYR, 0.5ML (FLUAD)   Darci Dueñas MD, Neurology, [facility]     Referral Priority:   Routine     Referral Type:   Eval and Treat     Referral Reason:   Specialty Services Required     Referred to Provider:   Noris Abdul MD     Requested Specialty:   Neurology     Number of Visits Requested:   1   Roxanne Herron MD, Dermatology, [facility]     Referral Priority:   Routine     Referral Type:   Eval and Treat     Referral Reason:   Specialty Services Required     Referred to Provider:   Maria Pop DO     Requested Specialty:   Dermatology     Number of Visits Requested:   1     Orders Placed This Encounter   Medications    meclizine (ANTIVERT) 25 MG tablet     Sig: Take 1 tablet by mouth 3 times daily as needed for Dizziness     Dispense:  30 tablet     Refill:  0    ketoconazole (NIZORAL) 2 % cream     Sig: Apply topically twice daily for at least 4 weeks (or for 1 week after lesions have healed).      Dispense:  1 each     Refill:  3    triamcinolone (KENALOG) 0.1 % ointment     Sig: Apply topically 2 times daily for 7 days     Dispense:  1 each     Refill:  3    DULoxetine (CYMBALTA) 60 MG extended release capsule     Sig: TAKE 1 CAPSULE BY MOUTH ONCE DAILY Dispense:  60 capsule     Refill:  3    gabapentin (NEURONTIN) 100 MG capsule     Sig: Take 1 capsule by mouth 3 times daily for 30 days. Dispense:  90 capsule     Refill:  0    spironolactone (ALDACTONE) 25 MG tablet     Sig: Take 1 tablet by mouth daily     Dispense:  90 tablet     Refill:  1       Return in about 6 weeks (around 11/5/2021).

## 2021-10-01 ENCOUNTER — OFFICE VISIT (OUTPATIENT)
Dept: GASTROENTEROLOGY | Age: 66
End: 2021-10-01
Payer: MEDICARE

## 2021-10-01 VITALS — HEART RATE: 75 BPM | OXYGEN SATURATION: 98 % | HEIGHT: 66 IN | WEIGHT: 293 LBS | BODY MASS INDEX: 47.09 KG/M2

## 2021-10-01 DIAGNOSIS — D12.3 ADENOMATOUS POLYP OF TRANSVERSE COLON: ICD-10-CM

## 2021-10-01 DIAGNOSIS — K58.2 IRRITABLE BOWEL SYNDROME WITH BOTH CONSTIPATION AND DIARRHEA: Primary | ICD-10-CM

## 2021-10-01 PROCEDURE — 99213 OFFICE O/P EST LOW 20 MIN: CPT | Performed by: SPECIALIST

## 2021-10-01 ASSESSMENT — ENCOUNTER SYMPTOMS
VOMITING: 0
ABDOMINAL PAIN: 0
RECTAL PAIN: 0
ANAL BLEEDING: 0
EYES NEGATIVE: 1
CONSTIPATION: 0
BLOOD IN STOOL: 0
DIARRHEA: 0
ABDOMINAL DISTENTION: 0
NAUSEA: 0
SHORTNESS OF BREATH: 1
GASTROINTESTINAL NEGATIVE: 1

## 2021-10-01 NOTE — PROGRESS NOTES
Gastroenterology Clinic Follow up Visit    Lindy Stover  [id number]  Chief Complaint   Patient presents with    Follow-up     Patient feeling well, no complaints at this time        HPI and A/P at last visit summarized below:  Patient is here for follow-up, no change in bowel habits no bleeding no abdominal pain, patient was placed on cholestyramine for relief of cramps and diarrhea which seem to have helped her significantly, consistency has improved. Review of Systems   Constitutional: Negative. HENT: Negative. Eyes: Negative. Respiratory: Positive for shortness of breath. COPD stable   Cardiovascular: Negative. Gastrointestinal: Negative. Negative for abdominal distention, abdominal pain, anal bleeding, blood in stool, constipation, diarrhea, nausea, rectal pain and vomiting. GI symptoms has improved   Endocrine: Negative. Genitourinary: Negative. Musculoskeletal: Negative. Skin: Negative. Allergic/Immunologic: Negative for food allergies. Neurological: Negative. Hematological: Negative. Psychiatric/Behavioral: Negative. Past medical history, past surgical history, medication list, social and familyhistory reviewed    Pulse 75, height 5' 6\" (1.676 m), weight (!) 311 lb (141.1 kg), SpO2 98 %, not currently breastfeeding. Physical Exam  Constitutional:       Appearance: She is well-developed. HENT:      Head: Normocephalic and atraumatic. Eyes:      Conjunctiva/sclera: Conjunctivae normal.      Pupils: Pupils are equal, round, and reactive to light. Cardiovascular:      Rate and Rhythm: Normal rate. Pulmonary:      Effort: Pulmonary effort is normal.   Abdominal:      General: Bowel sounds are normal.      Palpations: Abdomen is soft. Comments: Obese, soft nontender   Musculoskeletal:         General: Normal range of motion. Cervical back: Normal range of motion. Skin:     General: Skin is warm.    Neurological:      Mental Status:

## 2021-10-06 NOTE — TELEPHONE ENCOUNTER
She had her bloodwork done today and is asking if she needs to stay away from tomatoes, potatoes and beef still? Skin Substitute Injection Text: The defect edges were debeveled with a #15 scalpel blade.  Given the location of the defect, shape of the defect and the proximity to free margins a skin substitute micronized graft was deemed most appropriate.  The entire vial contents were admixed with 3.0ccs of sterile saline and then injected subcutaneously throughout the entire wound bed.

## 2021-10-07 ENCOUNTER — TELEPHONE (OUTPATIENT)
Dept: PULMONOLOGY | Age: 66
End: 2021-10-07

## 2021-10-08 NOTE — TELEPHONE ENCOUNTER
She did not tell me which location; I will need to contact her to find out. ..     [facility] [address]  [address], [address] [facility]

## 2021-10-22 RX ORDER — PRAVASTATIN SODIUM 20 MG
20 TABLET ORAL NIGHTLY
Qty: 90 TABLET | Refills: 3 | Status: SHIPPED | OUTPATIENT
Start: 2021-10-22 | End: 2022-05-28 | Stop reason: ALTCHOICE

## 2021-11-01 DIAGNOSIS — M54.16 LUMBAR RADICULOPATHY: ICD-10-CM

## 2021-11-01 RX ORDER — DULOXETIN HYDROCHLORIDE 60 MG/1
CAPSULE, DELAYED RELEASE ORAL
Qty: 60 CAPSULE | Refills: 3 | Status: SHIPPED | OUTPATIENT
Start: 2021-11-01 | End: 2021-11-05 | Stop reason: DRUGHIGH

## 2021-11-01 NOTE — TELEPHONE ENCOUNTER
Rx request   Requested Prescriptions     Pending Prescriptions Disp Refills    DULoxetine (CYMBALTA) 60 MG extended release capsule 60 capsule 3     Sig: TAKE 1 CAPSULE BY MOUTH ONCE DAILY     LOV 9/24/2021  Next Visit Date:  Future Appointments   Date Time Provider Preet [place]   11/5/2021 10:30 AM [facility]   11/17/2021 10:00 AM Sabra Johnson MD [facility]

## 2021-11-05 ENCOUNTER — OFFICE VISIT (OUTPATIENT)
Dept: PRIMARY CARE CLINIC | Age: 66
End: 2021-11-05
Payer: MEDICARE

## 2021-11-05 VITALS
WEIGHT: 293 LBS | TEMPERATURE: 96.4 F | DIASTOLIC BLOOD PRESSURE: 64 MMHG | RESPIRATION RATE: 18 BRPM | HEART RATE: 94 BPM | HEIGHT: 66 IN | SYSTOLIC BLOOD PRESSURE: 124 MMHG | OXYGEN SATURATION: 100 % | BODY MASS INDEX: 47.09 KG/M2

## 2021-11-05 DIAGNOSIS — G47.33 OSA (OBSTRUCTIVE SLEEP APNEA): ICD-10-CM

## 2021-11-05 DIAGNOSIS — E11.9 TYPE 2 DIABETES MELLITUS WITHOUT COMPLICATION, WITHOUT LONG-TERM CURRENT USE OF INSULIN (HCC): ICD-10-CM

## 2021-11-05 DIAGNOSIS — G47.19 EXCESSIVE DAYTIME SLEEPINESS: Primary | ICD-10-CM

## 2021-11-05 DIAGNOSIS — F33.0 MILD EPISODE OF RECURRENT MAJOR DEPRESSIVE DISORDER (HCC): ICD-10-CM

## 2021-11-05 PROCEDURE — 99214 OFFICE O/P EST MOD 30 MIN: CPT | Performed by: INTERNAL MEDICINE

## 2021-11-05 PROCEDURE — 3051F HG A1C>EQUAL 7.0%<8.0%: CPT | Performed by: INTERNAL MEDICINE

## 2021-11-05 RX ORDER — DULOXETIN HYDROCHLORIDE 30 MG/1
90 CAPSULE, DELAYED RELEASE ORAL DAILY
Qty: 180 CAPSULE | Refills: 3 | Status: SHIPPED | OUTPATIENT
Start: 2021-11-05

## 2021-11-05 ASSESSMENT — ENCOUNTER SYMPTOMS
DIARRHEA: 0
NAUSEA: 0
SHORTNESS OF BREATH: 0
VOMITING: 0
WHEEZING: 0
COUGH: 0
ABDOMINAL PAIN: 0

## 2021-11-05 NOTE — PROGRESS NOTES
Subjective:      Patient ID: Kassie Kulkarni is a 74 y.o. female    Follow up  HPI  Pt presents for follow up regarding daytime sleepiness and hallucinations. Not yet seen by Dr. Dereck Nuno for possible narcolepsy. Attests to depression, no anxiety or hallucinations. NO suicidal ideations. Currently on Cymbalta 60mg. Not taking Jardiance due to cost.   Hemoglobin A1C (%)   Date Value   10/06/2021 7.5 (H)   09/30/2021 7.6 (H)   03/20/2021 7.5 (H)     Past Medical History:   Diagnosis Date    Asthma     [facility]) [facility] [address]     Depression     Diabetes mellitus (HCC)      Past Surgical History:   Procedure Laterality Date    CHOLECYSTECTOMY      COLONOSCOPY N/A 4/19/2021    COLORECTAL CANCER SCREENING with polypectomies performed by Reji Ryan MD at [facility] Marital status:      Spouse name: Not on file    Number of children: Not on file    Years of education: Not on file    Highest education level: Not on file   Occupational History    Not on file   Tobacco Use    Smoking status: Former Smoker     Packs/day: 1.00     Years: 40.00     Pack years: 40.00     Types: Cigarettes     Start date: 24     Quit date: 2011     Years since quitting: 10.8    Smokeless tobacco: Former User     Quit date: 2011   Vaping Use    Vaping Use: Never used   Substance and Sexual Activity    Alcohol use: Not Currently     Comment: occass    Drug use: Never    Sexual activity: Not on file   Other Topics Concern    Not on file   Social History Narrative    Not on file     Social Determinants of Health     Financial Resource Strain: Low Risk     Difficulty of Paying Living Expenses: Not hard at all   Food Insecurity: No Food Insecurity    Worried About [address] in the Last Year: Never true    Negr[place] of Food in the Last Year: Never true   Transportation Needs:     Lack of Transportation (Medical):      Lack of Transportation (Non-Medical):    Physical Activity:     Days of Exercise per Week:     Minutes of Exercise per Session:    Stress:     Feeling of Stress :    Social Connections:     Frequency of Communication with Friends and Family:     Frequency of Social Gatherings with Friends and Family:     Attends Alevism Services:     Active Member of Clubs or Organizations:     Attends Club or Organization Meetings:     Marital Status:    Intimate Partner Violence:     Fear of Current or Ex-Partner:     Emotionally Abused:     Physically Abused:     Sexually Abused:      Family History   Problem Relation Age of Onset    Breast Cancer Mother     Colon Cancer Neg Hx      Allergies:  Budesonide-formoterol fumarate  Patient Active Problem List   Diagnosis    Left leg weakness    Inflammation of left sacroiliac joint ([address]    Piriformis syndrome of left side    Acute back pain with sciatica, left    Intervertebral disc stenosis of neural canal of lumbar region    Postlaminectomy syndrome, lumbar region    Adenomatous polyp of colon    Adenomatous polyp of ascending colon    Adenomatous polyp of transverse colon    Adenomatous polyp of sigmoid colon    Nicotine dependence    FROYLAN (obstructive sleep apnea)     Current Outpatient Medications on File Prior to Visit   Medication Sig Dispense Refill    pravastatin (PRAVACHOL) 20 MG tablet Take 1 tablet by mouth nightly 90 tablet 3    meclizine (ANTIVERT) 25 MG tablet Take 1 tablet by mouth 3 times daily as needed for Dizziness 30 tablet 0    ketoconazole (NIZORAL) 2 % cream Apply topically twice daily for at least 4 weeks (or for 1 week after lesions have healed).  1 each 3    spironolactone (ALDACTONE) 25 MG tablet Take 1 tablet by mouth daily 90 tablet 1    Fluticasone-Umeclidin-Vilant (TRELEGY ELLIPTA) 200-62.5-25 MCG/INH AEPB Inhale 1 puff into the lungs daily 3 each 1    cholestyramine (QUESTRAN) 4 g packet Take 1 packet by mouth 2 times daily 30 packet 2    Blood Glucose Monitoring Suppl (ONETOUCH VERIO FLEX SYSTEM) w/Device KIT USE AS DIRECTED      methocarbamol (ROBAXIN) 500 MG tablet Take 1 tablet by mouth twice a day      vitamin B-12 (CYANOCOBALAMIN) 500 MCG tablet Take 500 mcg by mouth daily      Omega-3 Fatty Acids (FISH OIL) 1000 MG CAPS Take 1 capsule by mouth 3 times daily 270 capsule 3    acetaminophen (TYLENOL) 500 MG tablet       blood glucose monitor kit and supplies Dispense sufficient amount for indicated testing frequency plus additional to accommodate PRN testing needs. Dispense all needed supplies to include: monitor, strips, lancing device, lancets, control solutions, alcohol swabs. 1 kit 0    Lancets MISC Test fasting and after largest meal of the day 300 each 5    blood glucose monitor strips DX: diabetes mellitus. Use fasting and after largest meal of the day - Ok to substitute per insurance 100 strip 5    albuterol (PROVENTIL) (2.5 MG/3ML) 0.083% nebulizer solution Take 2.5 mg by nebulization every 6 hours as needed for Wheezing      albuterol sulfate HFA (VENTOLIN HFA) 108 (90 Base) MCG/ACT inhaler Inhale 2 puffs into the lungs every 6 hours as needed for Wheezing      torsemide (DEMADEX) 20 MG tablet Take 20 mg by mouth daily      aspirin 81 MG EC tablet Take 81 mg by mouth daily      metFORMIN (GLUCOPHAGE) 500 MG tablet Take 1,000 mg by mouth Daily with supper      montelukast (SINGULAIR) 10 MG tablet Take 10 mg by mouth nightly      vitamin D (CHOLECALCIFEROL) 25 MCG (1000 UT) TABS tablet Take 1,000 Units by mouth nightly      gabapentin (NEURONTIN) 100 MG capsule Take 1 capsule by mouth 3 times daily for 30 days. 90 capsule 0     No current facility-administered medications on file prior to visit. Review of Systems   Constitutional: Negative for chills, diaphoresis, fatigue and fever. HENT: Negative for congestion. Respiratory: Negative for cough, shortness of breath and wheezing.     Cardiovascular: Negative for chest pain. Gastrointestinal: Negative for abdominal pain, diarrhea, nausea and vomiting. Endocrine: Negative for cold intolerance and heat intolerance. Genitourinary: Negative for dysuria and frequency. Neurological: Negative for dizziness and light-headedness. Psychiatric/Behavioral: Positive for dysphoric mood and hallucinations. Negative for decreased concentration, sleep disturbance and suicidal ideas. The patient is nervous/anxious. Objective:   /64   Pulse 94   Temp 96.4 °F (35.8 °C)   Resp 18   Ht 5' 6\" (1.676 m)   Wt (!) 315 lb 9.6 oz (143.2 kg)   SpO2 100%   BMI 50.94 kg/m²     Physical Exam  Constitutional:       General: She is not in acute distress. Appearance: She is not diaphoretic. Cardiovascular:      Rate and Rhythm: Normal rate and regular rhythm. Pulses: Normal pulses. Heart sounds: Normal heart sounds, S1 normal and S2 normal.   Pulmonary:      Effort: Pulmonary effort is normal. No respiratory distress. Breath sounds: Normal breath sounds. No wheezing or rales. Chest:      Chest wall: No tenderness. Abdominal:      General: Bowel sounds are normal.      Tenderness: There is no abdominal tenderness. Neurological:      Mental Status: She is alert. Assessment:       Diagnosis Orders   1. Excessive daytime sleepiness  Sharon Ruiz MD,  Neurology [place]   2. FROYLAN (obstructive sleep apnea)      on CPAP   3. Type 2 diabetes mellitus without complication, without long-term current use of insulin (HCC)  dapagliflozin (FARXIGA) 10 MG tablet   4.  Mild episode of recurrent major depressive disorder (HCC) Inadequately Controlled Michael Calvo MD, [place]    DULoxetine (CYMBALTA) 30 MG extended release capsule    will increase CYmbalta dose      Plan:      Orders Placed This Encounter   Procedures   Ethan Johnson MD,  Neurology [facility]     Referral Priority:   Routine     Referral Type:   Eval and Treat Referral Reason:   Specialty Services Required     Referred to Provider:   Sharlene Mccarty MD     Requested Specialty:   Neurology     Number of Visits Requested:   Pito Adames MD, [facility]     Referral Priority:   Routine     Referral Type:   Eval and Treat     Referral Reason:   Specialty Services Required     Referred to Provider:   Maria Power MD     Requested Specialty:   Psychiatry     Number of Visits Requested:   1     Orders Placed This Encounter   Medications    dapagliflozin (FARXIGA) 10 MG tablet     Sig: Take 1 tablet by mouth every morning     Dispense:  90 tablet     Refill:  1     Dc Jardiance    DULoxetine (CYMBALTA) 30 MG extended release capsule     Sig: Take 3 capsules by mouth daily     Dispense:  180 capsule     Refill:  3     Dc duloxetine 60mg capsules     Return in about 2 weeks (around 11/19/2021) for follow up, with PCP.

## 2021-11-22 ENCOUNTER — OFFICE VISIT (OUTPATIENT)
Dept: PRIMARY CARE CLINIC | Age: 66
End: 2021-11-22
Payer: MEDICARE

## 2021-11-22 VITALS
WEIGHT: 293 LBS | TEMPERATURE: 97.2 F | SYSTOLIC BLOOD PRESSURE: 132 MMHG | BODY MASS INDEX: 47.09 KG/M2 | HEART RATE: 84 BPM | DIASTOLIC BLOOD PRESSURE: 66 MMHG | RESPIRATION RATE: 18 BRPM | HEIGHT: 66 IN | OXYGEN SATURATION: 100 %

## 2021-11-22 DIAGNOSIS — G47.19 EXCESSIVE DAYTIME SLEEPINESS: Primary | ICD-10-CM

## 2021-11-22 DIAGNOSIS — G47.33 OSA (OBSTRUCTIVE SLEEP APNEA): ICD-10-CM

## 2021-11-22 DIAGNOSIS — F33.0 MILD EPISODE OF RECURRENT MAJOR DEPRESSIVE DISORDER (HCC): ICD-10-CM

## 2021-11-22 PROCEDURE — 99214 OFFICE O/P EST MOD 30 MIN: CPT | Performed by: INTERNAL MEDICINE

## 2021-11-22 ASSESSMENT — ENCOUNTER SYMPTOMS
SINUS PAIN: 0
SHORTNESS OF BREATH: 0
ABDOMINAL PAIN: 0
DIARRHEA: 0
COUGH: 0
VOMITING: 0
WHEEZING: 0
NAUSEA: 0

## 2021-11-22 NOTE — PROGRESS NOTES
Subjective:      Patient ID: Domitila Lee is a 74 y.o. female    Follow up   HPI  Pt presents for follow up regarding daytime sleepiness and hallucinations. Not yet seen by Dr. Ely Or for possible narcolepsy. Hallucinations are now resolved. Tolerating CPAP for FROYLAN. Attests to improved depression and anxiety or hallucinations. No suicidal ideations. Currently on Cymbalta 90mg. Mylinda Aver is unaffordable. Still has samples. Will check [place] coverage with insurance.    Past Medical History:   Diagnosis Date    Asthma     COPD [facility]chronic obstructive pulmonary disease) [facility] [address]     Depression     Diabetes mellitus ([facility] [address]      Past Surgical History:   Procedure Laterality Date    CHOLECYSTECTOMY      COLONOSCOPY N/A 4/19/2021    COLORECTAL CANCER SCREENING with polypectomies performed by Asa Ormond, MD at [facility] Marital status:      Spouse name: Not on file    Number of children: Not on file    Years of education: Not on file    Highest education level: Not on file   Occupational History    Not on file   Tobacco Use    Smoking status: Former Smoker     Packs/day: 1.00     Years: 40.00     Pack years: 40.00     Types: Cigarettes     Start date: 24     Quit date: 2011     Years since quitting: 10.8    Smokeless tobacco: Former User     Quit date: 2011   Vaping Use    Vaping Use: Never used   Substance and Sexual Activity    Alcohol use: Not Currently     Comment: occass    Drug use: Never    Sexual activity: Not on file   Other Topics Concern    Not on file   Social History Narrative    Not on file     Social Determinants of Health     Financial Resource Strain: Low Risk     Difficulty of Paying Living Expenses: Not hard at all   Food Insecurity: No Food Insecurity    Worried About [address] in the Last Year: Never true    Negrito of Food in the Last Year: Never true   Transportation Needs:     Lack of Transportation (Medical): Not on file    Lack of Transportation (Non-Medical):  Not on file   Physical Activity:     Days of Exercise per Week: Not on file    Minutes of Exercise per Session: Not on file   Stress:     Feeling of Stress : Not on file   Social Connections:     Frequency of Communication with Friends and Family: Not on file    Frequency of Social Gatherings with Friends and Family: Not on file    Attends Worship Services: Not on file    Active Member of [address] [place] [address] [employer] or Organizations: Not on file    Attends Club or Organization Meetings: Not on file    Marital Status: Not on file   Intimate Partner Violence:     Fear of Current or Ex-Partner: Not on file    Emotionally Abused: Not on file    Physically Abused: Not on file    Sexually Abused: Not on file   Housing Stability:     Unable to Pay for Housing in the Last Year: Not on file    Number of [place] in the Last Year: Not on file    Unstable Housing in the Last Year: Not on file     Family History   Problem Relation Age of Onset    Breast Cancer Mother     Colon Cancer Neg Hx      Allergies:  Budesonide-formoterol fumarate  Patient Active Problem List   Diagnosis    Left leg weakness    Inflammation of left sacroiliac joint ([address]    Piriformis syndrome of left side    Acute back pain with sciatica, left    Intervertebral disc stenosis of neural canal of lumbar region    Postlaminectomy syndrome, lumbar region    Adenomatous polyp of colon    Adenomatous polyp of ascending colon    Adenomatous polyp of transverse colon    Adenomatous polyp of sigmoid colon    Nicotine dependence    FROYLAN (obstructive sleep apnea)     Current Outpatient Medications on File Prior to Visit   Medication Sig Dispense Refill    dapagliflozin (FARXIGA) 10 MG tablet Take 1 tablet by mouth every morning 90 tablet 1    DULoxetine (CYMBALTA) 30 MG extended release capsule Take 3 capsules by mouth daily 180 capsule 3    pravastatin (PRAVACHOL) 20 MG tablet Take 1 tablet by mouth nightly 90 tablet 3    meclizine (ANTIVERT) 25 MG tablet Take 1 tablet by mouth 3 times daily as needed for Dizziness 30 tablet 0    ketoconazole (NIZORAL) 2 % cream Apply topically twice daily for at least 4 weeks (or for 1 week after lesions have healed). 1 each 3    spironolactone (ALDACTONE) 25 MG tablet Take 1 tablet by mouth daily 90 tablet 1    Fluticasone-Umeclidin-Vilant (TRELEGY ELLIPTA) 200-62.5-25 MCG/INH AEPB Inhale 1 puff into the lungs daily 3 each 1    cholestyramine (QUESTRAN) 4 g packet Take 1 packet by mouth 2 times daily 30 packet 2    Blood Glucose Monitoring Suppl (ONETOUCH VERIO FLEX SYSTEM) w/Device KIT USE AS DIRECTED      methocarbamol (ROBAXIN) 500 MG tablet Take 1 tablet by mouth twice a day      vitamin B-12 (CYANOCOBALAMIN) 500 MCG tablet Take 500 mcg by mouth daily      Omega-3 Fatty Acids (FISH OIL) 1000 MG CAPS Take 1 capsule by mouth 3 times daily 270 capsule 3    acetaminophen (TYLENOL) 500 MG tablet       blood glucose monitor kit and supplies Dispense sufficient amount for indicated testing frequency plus additional to accommodate PRN testing needs. Dispense all needed supplies to include: monitor, strips, lancing device, lancets, control solutions, alcohol swabs. 1 kit 0    Lancets MISC Test fasting and after largest meal of the day 300 each 5    blood glucose monitor strips DX: diabetes mellitus.  Use fasting and after largest meal of the day - Ok to substitute per insurance 100 strip 5    albuterol (PROVENTIL) (2.5 MG/3ML) 0.083% nebulizer solution Take 2.5 mg by nebulization every 6 hours as needed for Wheezing      albuterol sulfate HFA (VENTOLIN HFA) 108 (90 Base) MCG/ACT inhaler Inhale 2 puffs into the lungs every 6 hours as needed for Wheezing      torsemide (DEMADEX) 20 MG tablet Take 20 mg by mouth daily      aspirin 81 MG EC tablet Take 81 mg by mouth daily      metFORMIN (GLUCOPHAGE) 500 MG tablet Take 1,000 mg by mouth Daily with supper      montelukast (SINGULAIR) 10 MG tablet Take 10 mg by mouth nightly      vitamin D (CHOLECALCIFEROL) 25 MCG (1000 UT) TABS tablet Take 1,000 Units by mouth nightly      gabapentin (NEURONTIN) 100 MG capsule Take 1 capsule by mouth 3 times daily for 30 days. 90 capsule 0     No current facility-administered medications on file prior to visit. Review of Systems   Constitutional: Negative for chills, diaphoresis, fatigue and fever. HENT: Negative for congestion, sinus pain and sneezing. Respiratory: Negative for cough, shortness of breath and wheezing. Cardiovascular: Negative for chest pain. Gastrointestinal: Negative for abdominal pain, diarrhea, nausea and vomiting. Endocrine: Negative for cold intolerance and heat intolerance. Genitourinary: Negative for dysuria and frequency. Neurological: Negative for dizziness and light-headedness. Psychiatric/Behavioral: Negative for dysphoric mood. The patient is not nervous/anxious. Objective:   /66   Pulse 84   Temp 97.2 °F (36.2 °C)   Resp 18   Ht 5' 6\" (1.676 m)   Wt (!) 310 lb 6.4 oz (140.8 kg)   SpO2 100%   BMI 50.10 kg/m²     Physical Exam  Constitutional:       General: She is not in acute distress. Appearance: She is not diaphoretic. Cardiovascular:      Rate and Rhythm: Normal rate and regular rhythm. Pulses: Normal pulses. Heart sounds: Normal heart sounds, S1 normal and S2 normal.   Pulmonary:      Effort: Pulmonary effort is normal. No respiratory distress. Breath sounds: Normal breath sounds. No wheezing or rales. Chest:      Chest wall: No tenderness. Abdominal:      General: Bowel sounds are normal.      Tenderness: There is no abdominal tenderness. Neurological:      Mental Status: She is alert. Assessment:       Diagnosis Orders   1. Excessive daytime sleepiness      yet to make neuro appt   ? ?narcolepsy   2. FROYLAN (obstructive sleep apnea)      on CPAP   3.  Mild episode of recurrent major depressive disorder ([facility] [address] Improving        Plan:      No orders of the defined types were placed in this encounter. Return in about 6 months (around 5/22/2022) for follow up, with PCP.

## 2021-12-10 ENCOUNTER — TELEPHONE (OUTPATIENT)
Dept: PRIMARY CARE CLINIC | Age: 66
End: 2021-12-10

## 2021-12-10 DIAGNOSIS — E11.9 TYPE 2 DIABETES MELLITUS WITHOUT COMPLICATION, WITHOUT LONG-TERM CURRENT USE OF INSULIN (HCC): ICD-10-CM

## 2021-12-10 RX ORDER — CHOLESTYRAMINE 4 G/9G
1 POWDER, FOR SUSPENSION ORAL 2 TIMES DAILY
Qty: 30 PACKET | Refills: 2 | Status: SHIPPED | OUTPATIENT
Start: 2021-12-10

## 2021-12-10 RX ORDER — GLUCOSAMINE HCL/CHONDROITIN SU 500-400 MG
CAPSULE ORAL
Qty: 100 STRIP | Refills: 10 | Status: SHIPPED | OUTPATIENT
Start: 2021-12-10

## 2021-12-10 NOTE — TELEPHONE ENCOUNTER
----- Message from Marisa Olivera sent at 12/10/2021  9:58 AM EST -----  Subject: Refill Request    QUESTIONS  Name of Medication? blood glucose monitor strips  Patient-reported dosage and instructions? 2 X A DAY  How many days do you have left? 10  Preferred Pharmacy? [id number]  Pharmacy phone number (if available)? [phone]  Additional Information for Provider? PT NEEDS REFILL  ---------------------------------------------------------------------------  --------------  CALL BACK INFO  What is the best way for the office to contact you? OK to leave message on   voicemail  Preferred Call Back Phone Number?  [phone]

## 2021-12-13 DIAGNOSIS — M54.16 LUMBAR RADICULOPATHY: ICD-10-CM

## 2021-12-13 RX ORDER — GABAPENTIN 100 MG/1
100 CAPSULE ORAL 3 TIMES DAILY
Qty: 270 CAPSULE | Refills: 3 | Status: SHIPPED | OUTPATIENT
Start: 2021-12-13 | End: 2022-03-13

## 2021-12-15 DIAGNOSIS — E11.9 TYPE 2 DIABETES MELLITUS WITHOUT COMPLICATION, WITHOUT LONG-TERM CURRENT USE OF INSULIN (HCC): ICD-10-CM

## 2021-12-15 NOTE — TELEPHONE ENCOUNTER
Pt looking for faxiga if you have any samples. Said she can not afford it and would like some. Please leave message if available she has about 3 weeks left.

## 2022-01-03 ENCOUNTER — TELEPHONE (OUTPATIENT)
Dept: PRIMARY CARE CLINIC | Age: 67
End: 2022-01-03

## 2022-01-03 ENCOUNTER — OFFICE VISIT (OUTPATIENT)
Dept: PULMONOLOGY | Age: 67
End: 2022-01-03
Payer: MEDICARE

## 2022-01-03 VITALS
DIASTOLIC BLOOD PRESSURE: 80 MMHG | TEMPERATURE: 97.1 F | BODY MASS INDEX: 47.09 KG/M2 | SYSTOLIC BLOOD PRESSURE: 124 MMHG | HEIGHT: 66 IN | WEIGHT: 293 LBS | RESPIRATION RATE: 16 BRPM | OXYGEN SATURATION: 98 % | HEART RATE: 94 BPM

## 2022-01-03 DIAGNOSIS — J44.9 CHRONIC OBSTRUCTIVE PULMONARY DISEASE, UNSPECIFIED COPD TYPE (HCC): Primary | ICD-10-CM

## 2022-01-03 DIAGNOSIS — G47.33 OSA (OBSTRUCTIVE SLEEP APNEA): ICD-10-CM

## 2022-01-03 DIAGNOSIS — J96.11 CHRONIC RESPIRATORY FAILURE WITH HYPOXIA (HCC): ICD-10-CM

## 2022-01-03 DIAGNOSIS — Z87.891 PERSONAL HISTORY OF TOBACCO USE: ICD-10-CM

## 2022-01-03 DIAGNOSIS — I51.89 DIASTOLIC DYSFUNCTION: ICD-10-CM

## 2022-01-03 DIAGNOSIS — E66.01 CLASS 3 SEVERE OBESITY DUE TO EXCESS CALORIES WITH SERIOUS COMORBIDITY AND BODY MASS INDEX (BMI) OF 50.0 TO 59.9 IN ADULT (HCC): ICD-10-CM

## 2022-01-03 PROBLEM — J44.1 ASTHMA WITH COPD WITH EXACERBATION (HCC): Status: ACTIVE | Noted: 2017-06-29

## 2022-01-03 PROBLEM — J45.901 ASTHMA WITH COPD WITH EXACERBATION (HCC): Status: ACTIVE | Noted: 2017-06-29

## 2022-01-03 PROBLEM — E11.9 CONTROLLED TYPE 2 DIABETES MELLITUS WITHOUT COMPLICATION, WITHOUT LONG-TERM CURRENT USE OF INSULIN (HCC): Status: ACTIVE | Noted: 2017-06-29

## 2022-01-03 PROCEDURE — 99214 OFFICE O/P EST MOD 30 MIN: CPT | Performed by: INTERNAL MEDICINE

## 2022-01-03 PROCEDURE — G0296 VISIT TO DETERM LDCT ELIG: HCPCS | Performed by: INTERNAL MEDICINE

## 2022-01-03 NOTE — PROGRESS NOTES
Subjective:     Josefina Ramirez is a 74 y.o. female who complains today of:     Chief Complaint   Patient presents with    Follow-up     6 Month F/U for COPD and FROYLAN on CPAP       HPI  Patient presents for COPD and FROYLAN    Patient is doing much better, she has CPAP at home, she is compliant and uses every day, no daytime tiredness or sleepiness, no chest pain, no coughing, shortness of breath significantly improved currently on Trelegy Ellipta, no fever no chills, no lower extremity edema, no nasal congestion or postnasal drip, no heartburn, weight is stable.               Allergies:  Budesonide-formoterol fumarate  Past Medical History:   Diagnosis Date    Asthma     [facility]     Depression     Diabetes mellitus ([facility]      Past Surgical History:   Procedure Laterality Date    CHOLECYSTECTOMY      COLONOSCOPY N/A 2021    COLORECTAL CANCER SCREENING with polypectomies performed by [facility]ues [facility]     Family History   Problem Relation Age of Onset    Breast Cancer Mother     Colon Cancer Neg Hx      Social History     Socioeconomic History    Marital status:      Spouse name: Not on file    Number of children: Not on file    Years of education: Not on file    Highest education level: Not on file   Occupational History    Not on file   Tobacco Use    Smoking status: Former Smoker     Packs/day: 1.00     Years: 40.00     Pack years: 40.00     Types: Cigarettes     Start date: [address]     Quit date:      Years since quittin.0    Smokeless tobacco: Former User     Quit date:    Vaping Use    Vaping Use: Never used   Substance and Sexual Activity    Alcohol use: Not Currently     Comment: occass    Drug use: Never    Sexual activity: Not on file   Other Topics Concern    Not on file   Social History Narrative    Not on file     Social Determinants of Health     Financial Resource Strain: Low Risk     Difficulty of Paying Living Expenses: Not hard at all   Food Insecurity: No Food Insecurity    Worried About Running Out of Food in the Last Year: Never true    Ran Out of Food in the Last Year: Never true   Transportation Needs:     Lack of Transportation (Medical): Not on file    Lack of Transportation (Non-Medical): Not on file   Physical Activity:     Days of Exercise per Week: Not on file    Minutes of Exercise per Session: Not on file   Stress:     Feeling of Stress : Not on file   Social Connections:     Frequency of Communication with Friends and Family: Not on file    Frequency of Social Gatherings with Friends and Family: Not on file    Attends Buddhist Services: Not on file    Active Member of [address] [place] [address] or Organizations: Not on file    Attends Club or Organization Meetings: Not on file    Marital Status: Not on file   Intimate Partner Violence:     Fear of Current or Ex-Partner: Not on file    Emotionally Abused: Not on file    Physically Abused: Not on file    Sexually Abused: Not on file   Housing Stability:     Unable to Pay for Housing in the Last Year: Not on file    Number of [place] in the Last Year: Not on file    Unstable Housing in the Last Year: Not on file         Review of Systems      ROS: 10 organs review of system is done including general, psychological, ENT, hematological, endocrine, respiratory, cardiovascular, gastrointestinal,musculoskeletal, neurological,  allergy and Immunology is done and is otherwise negative. Current Outpatient Medications   Medication Sig Dispense Refill    dapagliflozin (FARXIGA) 10 MG tablet Take 1 tablet by mouth every morning 14 tabs Lot # [id number] exp 11/2023  14 tabs lot # [id number] exp 01/2021  28 tabs lot # [id number] exp 03/2023 56 tablet 0    gabapentin (NEURONTIN) 100 MG capsule Take 1 capsule by mouth 3 times daily for 90 days.  270 capsule 3    cholestyramine (QUESTRAN) 4 g packet Take 1 packet by mouth 2 times daily 30 packet 2    blood glucose monitor strips DX: diabetes mellitus. Use fasting and after largest meal of the day - Ok to substitute per insurance 100 strip 10    DULoxetine (CYMBALTA) 30 MG extended release capsule Take 3 capsules by mouth daily 180 capsule 3    pravastatin (PRAVACHOL) 20 MG tablet Take 1 tablet by mouth nightly 90 tablet 3    meclizine (ANTIVERT) 25 MG tablet Take 1 tablet by mouth 3 times daily as needed for Dizziness 30 tablet 0    ketoconazole (NIZORAL) 2 % cream Apply topically twice daily for at least 4 weeks (or for 1 week after lesions have healed). 1 each 3    spironolactone (ALDACTONE) 25 MG tablet Take 1 tablet by mouth daily 90 tablet 1    Fluticasone-Umeclidin-Vilant (TRELEGY ELLIPTA) 200-62.5-25 MCG/INH AEPB Inhale 1 puff into the lungs daily 3 each 1    Blood Glucose Monitoring Suppl (ONETOUCH VERIO FLEX SYSTEM) w/Device KIT USE AS DIRECTED      methocarbamol (ROBAXIN) 500 MG tablet Take 1 tablet by mouth twice a day      vitamin B-12 (CYANOCOBALAMIN) 500 MCG tablet Take 500 mcg by mouth daily      Omega-3 Fatty Acids (FISH OIL) 1000 MG CAPS Take 1 capsule by mouth 3 times daily 270 capsule 3    acetaminophen (TYLENOL) 500 MG tablet       blood glucose monitor kit and supplies Dispense sufficient amount for indicated testing frequency plus additional to accommodate PRN testing needs. Dispense all needed supplies to include: monitor, strips, lancing device, lancets, control solutions, alcohol swabs.  1 kit 0    Lancets MISC Test fasting and after largest meal of the day 300 each 5    albuterol (PROVENTIL) (2.5 MG/3ML) 0.083% nebulizer solution Take 2.5 mg by nebulization every 6 hours as needed for Wheezing      albuterol sulfate HFA (VENTOLIN HFA) 108 (90 Base) MCG/ACT inhaler Inhale 2 puffs into the lungs every 6 hours as needed for Wheezing      torsemide (DEMADEX) 20 MG tablet Take 20 mg by mouth daily      aspirin 81 MG EC tablet Take 81 mg by mouth daily      metFORMIN (GLUCOPHAGE) 500 MG tablet Take 1,000 mg by mouth Daily with supper      montelukast (SINGULAIR) 10 MG tablet Take 10 mg by mouth nightly      vitamin D (CHOLECALCIFEROL) 25 MCG (1000 UT) TABS tablet Take 1,000 Units by mouth nightly       No current facility-administered medications for this visit. Objective:     Vitals:    01/03/22 1411   BP: 124/80   Site: Left Lower Arm   Position: Sitting   Cuff Size: Medium Adult   Pulse: 94   Resp: 16   Temp: 97.1 °F (36.2 °C)   TempSrc: Infrared   SpO2: 98%   Weight: (!) 312 lb 3.2 oz (141.6 kg)   Height: 5' 6\" (1.676 m)         Physical Exam  Constitutional:       General: She is not in acute distress. Appearance: She is well-developed. She is not diaphoretic. HENT:      Head: Normocephalic and atraumatic. Eyes:      Conjunctiva/sclera: Conjunctivae normal.      Pupils: Pupils are equal, round, and reactive to light. Cardiovascular:      Rate and Rhythm: Normal rate and regular rhythm. Heart sounds: No murmur heard. No friction rub. No gallop. Pulmonary:      Effort: Pulmonary effort is normal. No respiratory distress. Breath sounds: Normal breath sounds. No wheezing or rales. Chest:      Chest wall: No tenderness. Abdominal:      General: There is no distension. Palpations: Abdomen is soft. Tenderness: There is no abdominal tenderness. There is no rebound. Musculoskeletal:         General: No tenderness. Cervical back: Normal range of motion and neck supple. Right lower leg: No edema. Left lower leg: No edema. Lymphadenopathy:      Cervical: No cervical adenopathy. Skin:     General: Skin is warm and dry. Findings: No erythema. Neurological:      Mental Status: She is alert and oriented to person, place, and time.    Psychiatric:         Judgment: Judgment normal.         Imaging studies reviewed by me CT May 2021, shows no mass or nodule   Lab results reviewed in chart  PFT May 2021, shows FEV1 77%, with FEV1/FVC Answer:   Low Dose CT [1]     Order Specific Question:   Is this the first (baseline) CT or an annual exam?     Answer: Annual [2]     Order Specific Question:   Does the patient show any signs or symptoms of lung cancer? Answer:   No     Order Specific Question:   Smoking Status? Answer: Former Smoker [4]     Order Specific Question:   Date quit smoking? (must be within 15 years)     Answer:   1/1/2011     Order Specific Question:   Smoking packs per day? Answer:   1     Order Specific Question:   Years smoking? Answer:   40    Pulse oximetry, overnight     ON CPAP  AND ROOM AIR     Standing Status:   Future     Standing Expiration Date:   7/3/2023     Scheduling Instructions:      ON CPAP  AND ROOM AIR    6 Minute Walk Test     Standing Status:   Future     Standing Expiration Date:   1/3/2023    ND VISIT TO DISCUSS LUNG CA SCREEN W LDCT     No orders of the defined types were placed in this encounter. Discussed with patient the importance of exercise and weight control and  overall health and well-being. Reviewed with the patient: current clinical status, medications, activities and diet. Side effects, adverse effects of the medication prescribed today, as well as treatment plan and result expectations have been discussed with the patient who expresses understanding and desires to proceed. Return in about 6 months (around 7/3/2022). Emmanuel Chavarria MD    Low Dose CT (LDCT) Lung Screening criteria met:     Age 55-77(Medicare) or 50-80 (USPST)   Pack year smoking >30 (Medicare) or >20 (USPSTF)   Still smoking or less than 15 year since quit   No sign or symptoms of lung cancer   > 11 months since last LDCT     Risks and benefits of lung cancer screening with LDCT scans discussed:    Significance of positive screen - False-positive LDCT results often occur. 95% of all positive results do not lead to a diagnosis of cancer.  Usually further imaging can resolve most false-positive results; however, some patients may require invasive procedures. Over diagnosis risk - 10% to 12% of screen-detected lung cancer cases are over diagnosed--that is, the cancer would not have been detected in the patient's lifetime without the screening. Need for follow up screens annually to continue lung cancer screening effectiveness     Risks associated with radiation from annual LDCT- Radiation exposure is about the same as for a mammogram, which is about 1/3 of the annual background radiation exposure from everyday life. Starting screening at age 54 is not likely to increase cancer risk from radiation exposure. Patients with comorbidities resulting in life expectancy of < 10 years, or that would preclude treatment of an abnormality identified on CT, should not be screened due to lack of benefit.     To obtain maximal benefit from this screening, smoking cessation and long-term abstinence from smoking is critical

## 2022-01-06 ENCOUNTER — OFFICE VISIT (OUTPATIENT)
Dept: NEUROLOGY | Age: 67
End: 2022-01-06
Payer: MEDICARE

## 2022-01-06 ENCOUNTER — TELEPHONE (OUTPATIENT)
Dept: NEUROLOGY | Age: 67
End: 2022-01-06

## 2022-01-06 VITALS
SYSTOLIC BLOOD PRESSURE: 136 MMHG | WEIGHT: 293 LBS | HEIGHT: 66 IN | DIASTOLIC BLOOD PRESSURE: 72 MMHG | BODY MASS INDEX: 47.09 KG/M2 | HEART RATE: 86 BPM

## 2022-01-06 DIAGNOSIS — R44.1 VISUAL HALLUCINATIONS: Primary | ICD-10-CM

## 2022-01-06 PROCEDURE — 99204 OFFICE O/P NEW MOD 45 MIN: CPT

## 2022-01-06 NOTE — TELEPHONE ENCOUNTER
Pt active with Alonzo Piedra as of 1/1/2022   No Referral Required for Specialist  $0 copay  20%co-insurance  Ref # [id number]

## 2022-01-06 NOTE — PROGRESS NOTES
[facility] Neurology Outpatient Consult Note   Name: Abdulaziz Erazo  Age: 74 y.o. Gender: female  Primary Care Provider: Kristofer Fry MD        Chief Complaint:New Patient (Fatigue/Hallucinations. Patient states that her symptoms have gotten worse in the past 3-4 months. Her hallucinations come and go, and are mostly present at nightime while watching television.)      HPI: Patient reports 3 to 4 months of hallucinations/delusions where, mostly while she is watching TV, she will think she is seeing something and then it takes a few seconds to process that it is not really there. She also has excessive daytime sleepiness. She had a sleep study which indicated obstructive sleep apnea. She is now on CPAP. According to Dr. Femi Cross note, her symptoms were improved but when I asked her today she says that she is still just a sleepy during the day. She does not have sudden inescapable onset of sleep. No cataplexy. No hypnopompic or hypnagogic hallucinations. She does describe that sometimes it takes her a few minutes to understand if the dream she had actually happened or not. She reports some memory issues. .  She does not have fluctuation in level of consciousness. In terms of her memory issues she reports that she will forget what questions she is being asked or why she entered her room. She went and lost her car in a parking lot. However she has not had issues with actually getting lost while driving or any safety concerns. She denies any presyncope, urinary continence, or other autonomic symptoms. No parkinsonism.        Patient Active Problem List   Diagnosis    Left leg weakness    Inflammation of left sacroiliac joint (HCC)    Piriformis syndrome of left side    Acute back pain with sciatica, left    Intervertebral disc stenosis of neural canal of lumbar region    Postlaminectomy syndrome, lumbar region    Adenomatous polyp of colon    Adenomatous polyp of ascending colon    Adenomatous polyp of transverse colon    Adenomatous polyp of sigmoid colon    Nicotine dependence    FROYLAN treated with BiPAP    Asthma with COPD with exacerbation (HCC)    Controlled type 2 diabetes mellitus without complication, without long-term current use of insulin ([address]    Morbid obesity due to excess calories ([address]       Past Medical History:   Diagnosis Date    Asthma     COPD (chronic obstructive pulmonary disease) (HCC)     Depression     Diabetes mellitus ([facility])        Medications:  Reviewed    Current Outpatient Medications   Medication Sig Dispense Refill    dapagliflozin (FARXIGA) 10 MG tablet Take 1 tablet by mouth every morning 14 tabs Lot # [id number] exp 11/2023  14 tabs lot # [id number] exp 01/2021  28 tabs lot # [id number] exp 03/2023 56 tablet 0    gabapentin (NEURONTIN) 100 MG capsule Take 1 capsule by mouth 3 times daily for 90 days. 270 capsule 3    cholestyramine (QUESTRAN) 4 g packet Take 1 packet by mouth 2 times daily 30 packet 2    blood glucose monitor strips DX: diabetes mellitus. Use fasting and after largest meal of the day - Ok to substitute per insurance 100 strip 10    DULoxetine (CYMBALTA) 30 MG extended release capsule Take 3 capsules by mouth daily 180 capsule 3    pravastatin (PRAVACHOL) 20 MG tablet Take 1 tablet by mouth nightly 90 tablet 3    meclizine (ANTIVERT) 25 MG tablet Take 1 tablet by mouth 3 times daily as needed for Dizziness 30 tablet 0    ketoconazole (NIZORAL) 2 % cream Apply topically twice daily for at least 4 weeks (or for 1 week after lesions have healed).  1 each 3    spironolactone (ALDACTONE) 25 MG tablet Take 1 tablet by mouth daily 90 tablet 1    Fluticasone-Umeclidin-Vilant (TRELEGY ELLIPTA) 200-62.5-25 MCG/INH AEPB Inhale 1 puff into the lungs daily 3 each 1    Blood Glucose Monitoring Suppl (ONETOUCH VERIO FLEX SYSTEM) w/Device KIT USE AS DIRECTED      methocarbamol (ROBAXIN) 500 MG tablet Take 1 tablet by mouth twice a day      vitamin B-12 (CYANOCOBALAMIN) 500 MCG tablet Take 500 mcg by mouth daily      Omega-3 Fatty Acids (FISH OIL) 1000 MG CAPS Take 1 capsule by mouth 3 times daily 270 capsule 3    acetaminophen (TYLENOL) 500 MG tablet       blood glucose monitor kit and supplies Dispense sufficient amount for indicated testing frequency plus additional to accommodate PRN testing needs. Dispense all needed supplies to include: monitor, strips, lancing device, lancets, control solutions, alcohol swabs. 1 kit 0    Lancets MISC Test fasting and after largest meal of the day 300 each 5    albuterol (PROVENTIL) (2.5 MG/3ML) 0.083% nebulizer solution Take 2.5 mg by nebulization every 6 hours as needed for Wheezing      albuterol sulfate HFA (VENTOLIN HFA) 108 (90 Base) MCG/ACT inhaler Inhale 2 puffs into the lungs every 6 hours as needed for Wheezing      torsemide (DEMADEX) 20 MG tablet Take 20 mg by mouth daily      aspirin 81 MG EC tablet Take 81 mg by mouth daily      metFORMIN (GLUCOPHAGE) 500 MG tablet Take 1,000 mg by mouth Daily with supper      montelukast (SINGULAIR) 10 MG tablet Take 10 mg by mouth nightly      vitamin D (CHOLECALCIFEROL) 25 MCG (1000 UT) TABS tablet Take 1,000 Units by mouth nightly       No current facility-administered medications for this visit. Allergies   Allergen Reactions    Budesonide-Formoterol Fumarate      Other reaction(s): THRUSH  Other reaction(s):  Other: See Comments  thrush       Family History   Problem Relation Age of Onset    Breast Cancer Mother     Colon Cancer Neg Hx        Past Surgical History:   Procedure Laterality Date    CHOLECYSTECTOMY      COLONOSCOPY N/A 4/19/2021    COLORECTAL CANCER SCREENING with polypectomies performed by Willie Brannon MD at [address] History     Tobacco History     Smoking Status  Former Smoker Smoking Start Date  1/1/1971 Quit date  1/1/2011 Smoking Frequency  1 pack/day for 40 years (36 pk yrs)    Smoking Tobacco Type  Cigarettes    Smokeless Tobacco Use  Former User Quit date  1/1/2011          Alcohol History     Alcohol Use Status  Not Currently Comment  occass          Drug Use     Drug Use Status  Never          Sexual Activity     Sexually Active  Not Asked                  Vitals:    01/06/22 0847   BP: 136/72   Pulse: 86         Neurologic Exam    Cognitive and Language: Alert and answered questions appropriately. Language was fluent including repetition and naming. Followed simple and complex commands. Cranial Nerves: Visual fields were full tested binocularly to finger counting in all 4 quadrants with no visual extinction. Pupils were equal and both reactive to light. Extraocular movements were full with no diplopia or nystagmus. Facial sensation was normal to light touch in V1 to V3. Facial strength was symmetric. Normal hearing grossly bilaterally. Palatal raise was symmetric. Shoulder shrug was symmetric. Tongue protrusion was symmetric with no fasciculations. Motor: Pronator drift was absent. Right Left     Shoulder Abduction:  5 5   Elbow Extension 5 5   Elbow Flexion 5 5   Wrist Extension 5 5   Finger Extension 5 5          Right Left   Hip Flexion 3 pannus 3 pannus   Knee Extension 5 5   Knee Flexion 5 5   Dorsiflexion 5 5   Plantar Flexion 5 5     Tremor: absent     No decrement or bradykinesia on finger taps    Tone: Normal in all four limbs    Reflexes:    Right Left   Brachioradialis 2 2   Biceps 2 2   Triceps 2 2   Patella 0 0   Ankle 0 0            Sensory: normal to light touch x 4 limbs. Coordination: Normal finger to nose  bilaterally. Negative romberg. Normal gait.         Labs:   Lab Results   Component Value Date    WBC 10.2 10/06/2021    HGB 14.6 10/06/2021    HCT 42.4 10/06/2021    MCV 89.6 10/06/2021     (H) 10/06/2021     Lab Results   Component Value Date    LABA1C 7.5 (H) 10/06/2021   No results found for: [id number]  Lab Results   Component Value Date TSH 1.380 10/06/2021           Radiology:    [unfilled]    Most recent    EEG No valid procedures specified. MRI of Brain Results for orders placed during the hospital encounter of 03/19/21    MRI BRAIN W WO CONTRAST    Narrative  EXAMINATION: MRI BRAIN W WO CONTRAST    CLINICAL HISTORY:  left leg and subsequently arm weakness and numbness. r/o stroke    COMPARISONS: NONE AVAILABLE    TECHNIQUE:    Multiplanar multisequence images of the brain were obtained before and after IV administration of contrast. Diffusion perfusion imaging was obtained. FINDINGS:    There are no extra-axial collections. There is no evidence of hemorrhage. There are no areas of perfusion diffusion signal abnormality to suggest ischemia. The susceptibility images do not demonstrate evidence of hemosiderin deposition within the brain  parenchyma or the leptomeninges. There is preservation of the gray-white matter differentiation. There are no areas of signal abnormality within the brain parenchyma or the posterior fossa to suggest lesion. There are no areas of abnormal enhancement after IV contrast administration. The sulci and ventricles are within normal limits without evidence of hydrocephalus. The midline structures are intact, the corpus callosum is within normal limits. The region of the pineal gland and the sella turcica are unremarkable. There are no space-occupying lesions in the posterior fossa. The basilar cisterns are patent. The craniocervical junction is unremarkable. The visualized portions of the orbits are within normal limits, the globes are intact. The visualized portions of the paranasal sinuses are within normal limits. The calvarium and soft tissues are unremarkable. Impression  There are no acute intracranial changes, no evidence of ischemia or hemorrhage. There are no regions of signal abnormality. There is no abnormal enhancement after IV contrast administration.   No results found for hallucinations. Return in about 6 months (around 7/6/2022). We discussed that I do not feel she has red flags for diagnosis of narcolepsy. She certainly does not meet criteria. No evidence of sleep onset REM on sleep study records which were reviewed. Her visual hallucinations raises possible concern of Lewy body dementia however her lack of parkinsonism on exam, and her primarily self-reported amnestic symptoms and lack of fluctuating level of consciousness do not support this diagnosis. To be cautious I think that I should see her in 6 months and do an MMSE at that time. Can also consider MRI if she develops any focal deficits. I discussed with her that meclizine being an antihistamine can sometimes worsen any cognitive dysfunction. She does not take this medication regularly.             Electronically signed by Lesia Navas MD on 1/6/2022 at 10:27 AM

## 2022-03-03 ENCOUNTER — TELEPHONE (OUTPATIENT)
Dept: PRIMARY CARE CLINIC | Age: 67
End: 2022-03-03

## 2022-03-09 NOTE — TELEPHONE ENCOUNTER
Samples given of what we had, farxiga 5mg 5 boxes 7 tab each box, pt aware to take 2 tablets daily     farxigo   5 boxes  Lot [id number] exp 5/22

## 2022-03-17 RX ORDER — TORSEMIDE 20 MG/1
20 TABLET ORAL DAILY
Qty: 90 TABLET | Refills: 3 | Status: SHIPPED | OUTPATIENT
Start: 2022-03-17

## 2022-03-17 RX ORDER — MONTELUKAST SODIUM 10 MG/1
10 TABLET ORAL NIGHTLY
Qty: 90 TABLET | Refills: 3 | Status: SHIPPED | OUTPATIENT
Start: 2022-03-17

## 2022-04-11 DIAGNOSIS — E11.9 TYPE 2 DIABETES MELLITUS WITHOUT COMPLICATION, WITHOUT LONG-TERM CURRENT USE OF INSULIN (HCC): ICD-10-CM

## 2022-04-21 DIAGNOSIS — E11.9 TYPE 2 DIABETES MELLITUS WITHOUT COMPLICATION, WITHOUT LONG-TERM CURRENT USE OF INSULIN (HCC): ICD-10-CM

## 2022-04-21 NOTE — TELEPHONE ENCOUNTER
Pt looking for more samples. Please advise and if available please call pt for . Going out of town, and needs asap.   Pt has appt 5/23 for diabetic follow up

## 2022-04-22 NOTE — TELEPHONE ENCOUNTER
Left message for patient to contact Astrid at [phone] for possible assistance for her medication Farxiga.

## 2022-04-27 RX ORDER — SPIRONOLACTONE 25 MG/1
25 TABLET ORAL DAILY
Qty: 90 TABLET | Refills: 3 | Status: SHIPPED | OUTPATIENT
Start: 2022-04-27 | End: 2022-06-09

## 2022-04-28 RX ORDER — SPIRONOLACTONE 25 MG/1
TABLET ORAL
Qty: 90 TABLET | Refills: 1 | OUTPATIENT
Start: 2022-04-28

## 2022-04-28 NOTE — TELEPHONE ENCOUNTER
pharmacy requesting medication refill.  Please approve or deny this request.    Rx requested:  Requested Prescriptions     Pending Prescriptions Disp Refills    spironolactone (ALDACTONE) 25 MG tablet [Pharmacy Med Name: spironolactone 25 mg tablet] 90 tablet 1     Sig: TAKE 1 TABLET BY MOUTH ONCE DAILY         Last Office Visit:   11/22/2021      Next Visit Date:  Future Appointments   Date Time Provider [place]   5/23/2022 11:30 AM Irvin Daly MD [facility]   6/1/2022 12:30 PM [place] CT ROOM 1 [facility] RAD   6/1/2022  1:30 PM [place] PFT ROOM [facility] PFT [address] [place] [address]   7/6/2022  1:00 PM Yamilet Ortiz MD [facility]

## 2022-04-29 NOTE — TELEPHONE ENCOUNTER
I spoke with patient today and yes she is needing assistance with her medication Farxiga and Trelegy Ellipta. Her insurance does not cover these 2 medications and the co-pay is very high and she is having difficulty paying for all of her medications. I am sending her 2 PAP applications which she will complete and forward back to me. Once I receive I will obtain RX and signature from Dr. Michelle Owen.

## 2022-05-09 DIAGNOSIS — E11.9 TYPE 2 DIABETES MELLITUS WITHOUT COMPLICATION, WITHOUT LONG-TERM CURRENT USE OF INSULIN (HCC): ICD-10-CM

## 2022-05-09 RX ORDER — LANCETS 30 GAUGE
EACH MISCELLANEOUS
Qty: 200 EACH | Refills: 10 | Status: SHIPPED | OUTPATIENT
Start: 2022-05-09

## 2022-05-16 ENCOUNTER — TELEPHONE (OUTPATIENT)
Dept: PRIMARY CARE CLINIC | Age: 67
End: 2022-05-16

## 2022-05-16 NOTE — TELEPHONE ENCOUNTER
following up on PAP applications that I mailed her on 4- per - did she receive them and does she have any questions. Patient advised to contact me at [phone].

## 2022-05-23 ENCOUNTER — OFFICE VISIT (OUTPATIENT)
Dept: PRIMARY CARE CLINIC | Age: 67
End: 2022-05-23
Payer: COMMERCIAL

## 2022-05-23 VITALS
BODY MASS INDEX: 47.09 KG/M2 | HEIGHT: 66 IN | DIASTOLIC BLOOD PRESSURE: 64 MMHG | HEART RATE: 83 BPM | OXYGEN SATURATION: 95 % | SYSTOLIC BLOOD PRESSURE: 126 MMHG | TEMPERATURE: 96.5 F | RESPIRATION RATE: 18 BRPM | WEIGHT: 293 LBS

## 2022-05-23 DIAGNOSIS — E11.9 TYPE 2 DIABETES MELLITUS WITHOUT COMPLICATION, WITHOUT LONG-TERM CURRENT USE OF INSULIN (HCC): Primary | ICD-10-CM

## 2022-05-23 DIAGNOSIS — E11.9 TYPE 2 DIABETES MELLITUS WITHOUT COMPLICATION, WITHOUT LONG-TERM CURRENT USE OF INSULIN (HCC): ICD-10-CM

## 2022-05-23 DIAGNOSIS — I80.9 PHLEBITIS AFTER INFUSION, INITIAL ENCOUNTER: ICD-10-CM

## 2022-05-23 DIAGNOSIS — T80.1XXA PHLEBITIS AFTER INFUSION, INITIAL ENCOUNTER: ICD-10-CM

## 2022-05-23 LAB
CHOLESTEROL, FASTING: 177 MG/DL (ref 0–199)
HDLC SERPL-MCNC: 43 MG/DL (ref 40–59)
LDL CHOLESTEROL CALCULATED: 105 MG/DL (ref 0–129)
TRIGLYCERIDE, FASTING: 145 MG/DL (ref 0–150)

## 2022-05-23 PROCEDURE — 3051F HG A1C>EQUAL 7.0%<8.0%: CPT | Performed by: INTERNAL MEDICINE

## 2022-05-23 PROCEDURE — 99214 OFFICE O/P EST MOD 30 MIN: CPT | Performed by: INTERNAL MEDICINE

## 2022-05-23 RX ORDER — GLIMEPIRIDE 1 MG/1
3 TABLET ORAL EVERY MORNING
Qty: 90 TABLET | Refills: 3 | Status: SHIPPED | OUTPATIENT
Start: 2022-05-23 | End: 2022-08-23 | Stop reason: SDUPTHER

## 2022-05-23 ASSESSMENT — ENCOUNTER SYMPTOMS
COUGH: 0
DIARRHEA: 0
ABDOMINAL PAIN: 0
WHEEZING: 0
SHORTNESS OF BREATH: 0
VOMITING: 0
NAUSEA: 0

## 2022-05-27 NOTE — TELEPHONE ENCOUNTER
Left another message for patient to contact Astrid at [phone] regarding PAP applications that I mailed her.

## 2022-05-28 DIAGNOSIS — E78.1 HYPERTRIGLYCERIDEMIA: ICD-10-CM

## 2022-05-28 DIAGNOSIS — E11.9 TYPE 2 DIABETES MELLITUS WITHOUT COMPLICATION, WITHOUT LONG-TERM CURRENT USE OF INSULIN (HCC): Primary | ICD-10-CM

## 2022-05-28 RX ORDER — ROSUVASTATIN CALCIUM 20 MG/1
20 TABLET, COATED ORAL NIGHTLY
Qty: 30 TABLET | Refills: 3 | Status: SHIPPED | OUTPATIENT
Start: 2022-05-28 | End: 2022-06-09

## 2022-05-31 ENCOUNTER — TELEPHONE (OUTPATIENT)
Dept: CARDIOTHORACIC SURGERY | Age: 67
End: 2022-05-31

## 2022-05-31 NOTE — TELEPHONE ENCOUNTER
Physician documentation on smoking history and CT Lung Screening reviewed. All required documentation complete. Patient is a former smoker (2011) with a 40 pack year history ( 1 ppd x 40 years) per physician documentation.

## 2022-06-01 ENCOUNTER — TELEPHONE (OUTPATIENT)
Dept: PRIMARY CARE CLINIC | Age: 67
End: 2022-06-01

## 2022-06-01 ENCOUNTER — HOSPITAL ENCOUNTER (OUTPATIENT)
Dept: CT IMAGING | Age: 67
Discharge: HOME | End: 2022-06-03
Payer: COMMERCIAL

## 2022-06-01 DIAGNOSIS — Z87.891 PERSONAL HISTORY OF TOBACCO USE: ICD-10-CM

## 2022-06-01 DIAGNOSIS — Z12.31 VISIT FOR SCREENING MAMMOGRAM: Primary | ICD-10-CM

## 2022-06-01 PROCEDURE — 71271 CT THORAX LUNG CANCER SCR C-: CPT

## 2022-06-03 ENCOUNTER — HOSPITAL ENCOUNTER (OUTPATIENT)
Dept: WOMENS IMAGING | Age: 67
Discharge: HOME | End: 2022-06-05
Payer: COMMERCIAL

## 2022-06-03 DIAGNOSIS — Z12.31 VISIT FOR SCREENING MAMMOGRAM: ICD-10-CM

## 2022-06-03 PROCEDURE — 77067 SCR MAMMO BI INCL CAD: CPT

## 2022-06-08 ENCOUNTER — HOSPITAL ENCOUNTER (OUTPATIENT)
Dept: PULMONOLOGY | Age: 67
Discharge: HOME | End: 2022-06-08
Payer: COMMERCIAL

## 2022-06-08 VITALS — WEIGHT: 293 LBS | BODY MASS INDEX: 47.09 KG/M2 | HEIGHT: 66 IN

## 2022-06-08 DIAGNOSIS — J44.9 CHRONIC OBSTRUCTIVE PULMONARY DISEASE, UNSPECIFIED COPD TYPE (HCC): ICD-10-CM

## 2022-06-08 PROCEDURE — 94618 PULMONARY STRESS TESTING: CPT

## 2022-06-08 PROCEDURE — 94761 N-INVAS EAR/PLS OXIMETRY MLT: CPT

## 2022-06-08 ASSESSMENT — 6 MINUTE WALK TEST (6MWT)
% PREDICTED: 74.5
O2 SATURATION: 95
O2 SATURATION: 93
BORG FATIGUE SCALE SCORE: 6
DID PATIENT STOP OR PAUSE BEFORE 6 MINUTES?: YES
BORG DYSPNEA SCALE SCORE: 2
HEART RATE: 105
TOTAL DISTANCE WALKED (M): 229.45
BLOOD PRESSURE 1: 136/102
SYMPTOMS: FATIGUE;SOB
BORG FATIGUE SCALE SCORE: 7
BLOOD PRESSURE: 146/99
O2 SATURATION: 96
OXYGEN DEVICE: ROOM AIR
HEART RATE: 132
BORG DYSPNEA SCALE SCORE: 4
HEART RATE: 102
O2 SATURATION: 96

## 2022-06-08 NOTE — PROGRESS NOTES
06/08/22 1348   Data Measured Before Walk   Height 5' 5.98\" (1.676 m)   Weight (!) 302 lb 4 oz (137.1 kg)      Blood Pressure (!) 136/102   O2 Saturation 95   O2 Device Room air   Ronald Dyspnea Scale 2   Ronald Fatigue Scale 6   Data Measured During the Walk   O2 Saturation 96   Symptoms Fatigue;SOB   Data Measured Immediately After Walk   Did Patient Stop or Pause Before 6 Minutes Yes   Why Patient Stopped or Paused seema ruiz, 1 stop   Predicted Distance (m) 308 Meters   Total Distance Walked (m) 229.45 Meters   % Predicted 74.5   Heart Rate 132   Blood Pressure (!) 146/99   O2 Saturation 93   Ronald Dyspnea Scale 4   Ronald Fatigue Scale 7   Data Measured 5 Minutes After Walk   Heart Rate 102   O2 Saturation 96

## 2022-06-08 NOTE — PROGRESS NOTES
06/08/22 1354   Resting (Room Air)   SpO2 96      During Walk (Room Air)   SpO2 93      Rate of Dyspnea 1   Symptoms Fatigue; Shortness of breath   After Walk   SpO2 96      Comments pt does not qualify   Does the Patient Qualify for Home O2 No

## 2022-06-09 ENCOUNTER — OFFICE VISIT (OUTPATIENT)
Dept: PRIMARY CARE CLINIC | Age: 67
End: 2022-06-09
Payer: COMMERCIAL

## 2022-06-09 VITALS
WEIGHT: 293 LBS | HEART RATE: 104 BPM | DIASTOLIC BLOOD PRESSURE: 82 MMHG | HEIGHT: 66 IN | SYSTOLIC BLOOD PRESSURE: 124 MMHG | BODY MASS INDEX: 47.09 KG/M2 | OXYGEN SATURATION: 95 % | TEMPERATURE: 96.8 F

## 2022-06-09 DIAGNOSIS — J02.9 SORE THROAT: ICD-10-CM

## 2022-06-09 DIAGNOSIS — Z13.31 POSITIVE DEPRESSION SCREENING: ICD-10-CM

## 2022-06-09 DIAGNOSIS — B37.0 ORAL THRUSH: Primary | ICD-10-CM

## 2022-06-09 PROCEDURE — 1123F ACP DISCUSS/DSCN MKR DOCD: CPT | Performed by: NURSE PRACTITIONER

## 2022-06-09 PROCEDURE — 87880 STREP A ASSAY W/OPTIC: CPT | Performed by: NURSE PRACTITIONER

## 2022-06-09 PROCEDURE — 99213 OFFICE O/P EST LOW 20 MIN: CPT | Performed by: NURSE PRACTITIONER

## 2022-06-09 SDOH — ECONOMIC STABILITY: TRANSPORTATION INSECURITY
IN THE PAST 12 MONTHS, HAS LACK OF TRANSPORTATION KEPT YOU FROM MEETINGS, WORK, OR FROM GETTING THINGS NEEDED FOR DAILY LIVING?: NO

## 2022-06-09 SDOH — ECONOMIC STABILITY: FOOD INSECURITY: WITHIN THE PAST 12 MONTHS, THE FOOD YOU BOUGHT JUST DIDN'T LAST AND YOU DIDN'T HAVE MONEY TO GET MORE.: NEVER TRUE

## 2022-06-09 SDOH — ECONOMIC STABILITY: FOOD INSECURITY: WITHIN THE PAST 12 MONTHS, YOU WORRIED THAT YOUR FOOD WOULD RUN OUT BEFORE YOU GOT MONEY TO BUY MORE.: NEVER TRUE

## 2022-06-09 SDOH — ECONOMIC STABILITY: TRANSPORTATION INSECURITY
IN THE PAST 12 MONTHS, HAS THE LACK OF TRANSPORTATION KEPT YOU FROM MEDICAL APPOINTMENTS OR FROM GETTING MEDICATIONS?: NO

## 2022-06-09 ASSESSMENT — ENCOUNTER SYMPTOMS
CHEST TIGHTNESS: 0
ABDOMINAL PAIN: 0
WHEEZING: 0
APNEA: 0
SORE THROAT: 1
CONSTIPATION: 0
EYE ITCHING: 0
COUGH: 0
DIARRHEA: 0
NAUSEA: 0
SINUS PAIN: 0
TROUBLE SWALLOWING: 0
VOMITING: 0
SHORTNESS OF BREATH: 0
EYE REDNESS: 0

## 2022-06-09 ASSESSMENT — COLUMBIA-SUICIDE SEVERITY RATING SCALE - C-SSRS
2. HAVE YOU ACTUALLY HAD ANY THOUGHTS OF KILLING YOURSELF?: NO
6. HAVE YOU EVER DONE ANYTHING, STARTED TO DO ANYTHING, OR PREPARED TO DO ANYTHING TO END YOUR LIFE?: NO
1. WITHIN THE PAST MONTH, HAVE YOU WISHED YOU WERE DEAD OR WISHED YOU COULD GO TO SLEEP AND NOT WAKE UP?: NO

## 2022-06-09 ASSESSMENT — PATIENT HEALTH QUESTIONNAIRE - PHQ9
4. FEELING TIRED OR HAVING LITTLE ENERGY: 3
1. LITTLE INTEREST OR PLEASURE IN DOING THINGS: 3
2. FEELING DOWN, DEPRESSED OR HOPELESS: 1
6. FEELING BAD ABOUT YOURSELF - OR THAT YOU ARE A FAILURE OR HAVE LET YOURSELF OR YOUR FAMILY DOWN: 0
SUM OF ALL RESPONSES TO PHQ QUESTIONS 1-9: 13
SUM OF ALL RESPONSES TO PHQ QUESTIONS 1-9: 14
3. TROUBLE FALLING OR STAYING ASLEEP: 3
5. POOR APPETITE OR OVEREATING: 2
9. THOUGHTS THAT YOU WOULD BE BETTER OFF DEAD, OR OF HURTING YOURSELF: 1
10. IF YOU CHECKED OFF ANY PROBLEMS, HOW DIFFICULT HAVE THESE PROBLEMS MADE IT FOR YOU TO DO YOUR WORK, TAKE CARE OF THINGS AT HOME, OR GET ALONG WITH OTHER PEOPLE: 0
7. TROUBLE CONCENTRATING ON THINGS, SUCH AS READING THE NEWSPAPER OR WATCHING TELEVISION: 1
SUM OF ALL RESPONSES TO PHQ9 QUESTIONS 1 & 2: 4
8. MOVING OR SPEAKING SO SLOWLY THAT OTHER PEOPLE COULD HAVE NOTICED. OR THE OPPOSITE, BEING SO FIGETY OR RESTLESS THAT YOU HAVE BEEN MOVING AROUND A LOT MORE THAN USUAL: 0

## 2022-06-09 ASSESSMENT — SOCIAL DETERMINANTS OF HEALTH (SDOH): HOW HARD IS IT FOR YOU TO PAY FOR THE VERY BASICS LIKE FOOD, HOUSING, MEDICAL CARE, AND HEATING?: NOT HARD AT ALL

## 2022-06-09 NOTE — PROGRESS NOTES
Subjective:      Patient ID: Simin Dangelo is a 69 y.o. female who presents today for:  Chief Complaint   Patient presents with    Pharyngitis     sore throat, Left ear ache, headache, allergies; Sx x 3 days   Pt here and reports she is not covid vaccinated. Pt declines any covid or Flu testing today. Pt has + depression screening and declines seeking a counselor at this time but declines any suicidal thoughts or plan to harm herself or an active plan. Pharyngitis  This is a new problem. The current episode started in the past 7 days (x 3 days). The problem occurs constantly. The problem has been unchanged. Associated symptoms include headaches (pt declines it is the worst HA of life or thunderclap in appearance) and a sore throat. Pertinent negatives include no abdominal pain, anorexia, arthralgias, chest pain, chills, coughing, fever, myalgias, nausea or vomiting. Nothing aggravates the symptoms. Treatments tried: cough drops. The treatment provided mild relief. Otalgia   There is pain in the left (hurts when she swallows per pt) ear. This is a new problem. The current episode started in the past 7 days (x 2 days). The problem occurs every few hours. The problem has been waxing and waning. There has been no fever. The pain is mild. Associated symptoms include headaches (pt declines it is the worst HA of life or thunderclap in appearance) and a sore throat. Pertinent negatives include no abdominal pain, coughing, diarrhea, hearing loss or vomiting. She has tried acetaminophen for the symptoms. There is no history of a chronic ear infection, hearing loss or a tympanostomy tube.        Past Medical History:   Diagnosis Date    Asthma     [facility]     Depression     Diabetes mellitus ([facility] [address] [facility]      Past Surgical History:   Procedure Laterality Date    CHOLECYSTECTOMY      COLONOSCOPY N/A 4/19/2021    COLORECTAL CANCER SCREENING with polypectomies performed by Brandy SALMERON Will Reilly [facility] at [facility] Marital status:      Spouse name: Not on file    Number of children: Not on file    Years of education: Not on file    Highest education level: Not on file   Occupational History    Not on file   Tobacco Use    Smoking status: Former Smoker     Packs/day: 1.00     Years: 40.00     Pack years: 40.00     Types: Cigarettes     Start date:      Quit date:      Years since quittin.4    Smokeless tobacco: Former User     Quit date:    Vaping Use    Vaping Use: Never used   Substance and Sexual Activity    Alcohol use: Not Currently     Comment: occass    Drug use: Never    Sexual activity: Not on file   Other Topics Concern    Not on file   Social History Narrative    Not on file     Social Determinants of Health     Financial Resource Strain: Low Risk     Difficulty of Paying Living Expenses: Not hard at all   Food Insecurity: No Food Insecurity    Worried About [address] in the Last Year: Never true    [facility] in the Last Year: Never true   Transportation Needs: No Transportation Needs    Lack of Transportation (Medical): No    Lack of Transportation (Non-Medical):  No   Physical Activity:     Days of Exercise per Week: Not on file    Minutes of Exercise per Session: Not on file   Stress:     Feeling of Stress : Not on file   Social Connections:     Frequency of Communication with Friends and Family: Not on file    Frequency of Social Gatherings with Friends and Family: Not on file    Attends Tenriism Services: Not on file    Active Member of Clubs or Organizations: Not on file    Attends Club or Organization Meetings: Not on file    Marital Status: Not on file   Intimate Partner Violence:     Fear of Current or Ex-Partner: Not on file    Emotionally Abused: Not on file    Physically Abused: Not on file    Sexually Abused: Not on file   Housing Stability:     Unable to Pay for Housing in the Last Year: Not on file    Number of Places Lived in the Last Year: Not on file    Unstable Housing in the Last Year: Not on file     Family History   Problem Relation Age of Onset    Breast Cancer Mother     Colon Cancer Neg Hx      Allergies   Allergen Reactions    Budesonide-Formoterol Fumarate      Other reaction(s): THRUSH  Other reaction(s): Other: See Comments  thrush         Review of Systems   Constitutional: Negative for activity change, appetite change, chills and fever. HENT: Positive for ear pain (pt reports every time she swallows ear hurts), postnasal drip and sore throat. Negative for drooling, hearing loss, sinus pain and trouble swallowing. Eyes: Negative for redness, itching and visual disturbance. Respiratory: Negative for apnea, cough, chest tightness, shortness of breath and wheezing. Cardiovascular: Negative for chest pain and palpitations. Gastrointestinal: Negative for abdominal pain, anorexia, constipation, diarrhea, nausea and vomiting. Endocrine: Negative for heat intolerance. Genitourinary: Negative for difficulty urinating, flank pain and genital sores. Musculoskeletal: Negative for arthralgias, gait problem, myalgias and neck stiffness. Neurological: Positive for headaches (pt declines it is the worst HA of life or thunderclap in appearance). Negative for tremors, seizures and facial asymmetry. Hematological: Negative for adenopathy. Psychiatric/Behavioral: Negative for behavioral problems and suicidal ideas. The patient is not hyperactive. All other systems reviewed and are negative. Objective:   /82 (Site: Right Upper Arm, Position: Sitting, Cuff Size: Large Adult)   Pulse (!) 104   Temp 96.8 °F (36 °C)   Ht 5' 6\" (1.676 m) Comment: per pt  Wt 296 lb 6.4 oz (134.4 kg)   LMP  (LMP Unknown)   SpO2 95%   Breastfeeding No   BMI 47.84 kg/m²     Physical Exam  Vitals and nursing note reviewed.    Constitutional: General: She is awake. She is not in acute distress. Appearance: Normal appearance. She is well-developed and well-groomed. She is obese. She is not ill-appearing, toxic-appearing or diaphoretic. HENT:      Head: Normocephalic and atraumatic. Right Ear: Hearing normal. No middle ear effusion. There is no impacted cerumen. Tympanic membrane is not injected, erythematous or bulging. Left Ear: Hearing normal.  No middle ear effusion. There is no impacted cerumen. Tympanic membrane is not injected, erythematous or bulging. Nose: Mucosal edema and rhinorrhea present. Rhinorrhea is clear. Right Turbinates: Swollen. Left Turbinates: Swollen. Mouth/Throat:      Lips: Pink. No lesions. Mouth: Mucous membranes are moist. No angioedema. Pharynx: Oropharynx is clear. Posterior oropharyngeal erythema present. No pharyngeal swelling or oropharyngeal exudate. Tonsils: No tonsillar exudate or tonsillar abscesses. Eyes:      Extraocular Movements: Extraocular movements intact. Conjunctiva/sclera: Conjunctivae normal.      Pupils: Pupils are equal, round, and reactive to light. Cardiovascular:      Rate and Rhythm: Regular rhythm. Tachycardia present. Pulses: Normal pulses. Heart sounds: Normal heart sounds. No murmur heard. Comments: Hr-104  Pulmonary:      Effort: Pulmonary effort is normal. No tachypnea, bradypnea or respiratory distress. Breath sounds: Normal breath sounds and air entry. No stridor or transmitted upper airway sounds. No decreased breath sounds, wheezing or rhonchi. Abdominal:      General: Abdomen is flat. Bowel sounds are normal. There is no distension. Palpations: Abdomen is soft. Tenderness: There is no abdominal tenderness. There is no right CVA tenderness, left CVA tenderness, guarding or rebound. Musculoskeletal:         General: No signs of injury. Normal range of motion.       Cervical back: Normal range of motion. Lymphadenopathy:      Cervical: No cervical adenopathy. Skin:     General: Skin is warm and dry. Capillary Refill: Capillary refill takes less than 2 seconds. Findings: No erythema or rash. Neurological:      General: No focal deficit present. Mental Status: She is alert and oriented to person, place, and time. Mental status is at baseline. Motor: No weakness. Psychiatric:         Attention and Perception: Attention and perception normal.         Mood and Affect: Mood and affect normal.         Speech: Speech normal.         Behavior: Behavior normal. Behavior is cooperative. Thought Content: Thought content normal.         Judgment: Judgment normal.         Assessment:       Diagnosis Orders   1. Oral thrush  nystatin (MYCOSTATIN) [id number] UNIT/ML suspension   2. Sore throat  POCT rapid strep A    Culture, Throat   3. Positive depression screening           Plan:      Orders Placed This Encounter   Procedures    Culture, Throat     Standing Status:   Future     Number of Occurrences:   1     Standing Expiration Date:   6/9/2023    POCT rapid strep A     Orders Placed This Encounter   Medications    nystatin (MYCOSTATIN) [id number] UNIT/ML suspension     Sig: Take 5 mLs by mouth 4 times daily for 14 days Swish and swallow over 1-2 minutes. Dispense:  280 mL     Refill:  0     Pt here today and she was tested for strep and this was Neg. Pt aware and that we will call her with the throat culture result when it comes back. PT aware of the red flag s/s that would warrant a trip to ER such as drooling, trouble breathing, swallowing, Chest pain, or SOB. Pt advised how to take the oral anti fungal med and if her s/s persist to follow up with her PCP. Pt verbalized understanding of the [address] plan at this time. Pt shows no distress today. Pt left the [facility] today in stable condition. Return if symptoms worsen or fail to improve.     Reviewed with the patient: current clinical status, medications, activities and diet. Side effects, adverse effects of the medication prescribed today, as well as treatment plan and result expectations have been discussed with the patient who expresses understanding and desires to proceed. Close follow up to evaluate treatment results and for coordination of care. I have reviewed the patient's medical history in detail and updated the computerized patient record.       Tamika Eduardo, APRN - CNP

## 2022-06-09 NOTE — PATIENT INSTRUCTIONS
Patient Education        Candidiasis: Care Instructions  Your Care Instructions  Candidiasis (say \"juq-qgn-AM-uh-aaron\") is a yeast infection. Yeast normally lives in your body. But it can cause problems if your body's defenses don'twork as they should. Some medicines can increase your chance of getting a yeast infection. These include antibiotics, steroids, and cancer drugs. And some diseases like AIDSand diabetes can make you more likely to get yeast infections. There are different types of yeast infections. Davi Rodriguez is a yeast infection in the mouth. It usually occurs in people with weakimmune systems. It causes white patches inside the mouth and throat. Yeast infections of the skin usually occur in skin folds where the skin stays moist. They cause red, oozing patches on your skin. Babies can get these infections under the diaper. Peoplewho often wear gloves can get them on their hands. Many women get vaginal yeast infections. They are most common when women take antibiotics. These infections can cause the vagina to itch and burn. They also cause white discharge that looks likecottage cheese. In rare cases, yeast infects the blood. This can cause serious disease. This kind of infection is treated with medicine given through a needle into a vein(IV). After you start treatment, a yeast infection usually goes away quickly. But if your immune system is weak, the infection may come back. Tell your doctor ifyou get yeast infections often. Follow-up care is a key part of your treatment and safety. Be sure to make and go to all appointments, and call your doctor if you are having problems. It's also a good idea to know your test results and keep alist of the medicines you take. How can you care for yourself at home?  Take your medicines exactly as prescribed. Call your doctor if you think you are having a problem with your medicine.  Use antibiotics only as directed by your doctor.    Eat yogurt with live cultures. It has bacteria called lactobacillus. It may help prevent some types of yeast infections.  Keep your skin clean and dry. Put powder on moist places.  If you are using a cream or suppository to treat a vaginal yeast infection, don't use condoms or a diaphragm. Use a different type of birth control.  Eat a healthy diet and get regular exercise. This will help keep your immune system strong. When should you call for help? Watch closely for changes in your health, and be sure to contact your doctor if:     You do not get better as expected. Where can you learn more? Go to https://Janrainpepiceweb.[email or web address]. org and sign in to your [employer] account. Enter I539 in the PopJam box to learn more about \"Candidiasis: Care Instructions. \"     If you do not have an account, please click on the \"Sign Up Now\" link. Current as of: November 22, 2021               Content Version: 13.2  © 2006-2022 [employer]. Care instructions adapted under license by [facility] ([facility]). If you have questions about a medical condition or this instruction, always ask your healthcare professional. [place] [address] any warranty or liability for your use of this information. Patient Education        Sore Throat: Care Instructions  Overview     Infection by bacteria or a virus causes most sore throats. Cigarette smoke, dry air, air pollution, allergies, and yelling can also cause a sore throat. Sore throats can be painful and annoying. Fortunately, most sore throats go away on their own. If you have a bacterial infection, your doctor may prescribeantibiotics. Follow-up care is a key part of your treatment and safety. Be sure to make and go to all appointments, and call your doctor if you are having problems. It's also a good idea to know your test results and keep alist of the medicines you take. How can you care for yourself at home?    If your doctor prescribed antibiotics, take them as directed. Do not stop taking them just because you feel better. You need to take the full course of antibiotics.  Gargle with warm salt water several times a day to help reduce swelling and relieve pain. Mix 1/2 teaspoon of salt in 1 cup of warm water.  Take an over-the-counter pain medicine, such as acetaminophen (Tylenol), ibuprofen (Advil, Motrin), or naproxen (Aleve). Read and follow all instructions on the label.  Be careful when taking over-the-counter cold or flu medicines and Tylenol at the same time. Many of these medicines have acetaminophen, which is Tylenol. Read the labels to make sure that you are not taking more than the recommended dose. Too much acetaminophen (Tylenol) can be harmful.  Drink plenty of fluids. Fluids may help soothe an irritated throat. Hot fluids, such as tea or soup, may help decrease throat pain.  Use over-the-counter throat lozenges to soothe pain. Regular cough drops or hard candy may also help. These should not be given to young children because of the risk of choking.  Do not smoke or allow others to smoke around you. If you need help quitting, talk to your doctor about stop-smoking programs and medicines. These can increase your chances of quitting for good.  Use a vaporizer or humidifier to add moisture to your bedroom. Follow the directions for cleaning the machine. When should you call for help? Call your doctor now or seek immediate medical care if:     You have trouble breathing.      Your sore throat gets much worse on one side.      You have new or worse trouble swallowing.      You have a new or higher fever. Watch closely for changes in your health, and be sure to contact your doctor ifyou do not get better as expected. Where can you learn more? Go to https://Pomogatelbisi.[employer]. org and sign in to your [email or web address] account. Enter K561 in the DokDok box to learn more about \"Sore Throat: Care Instructions. \" If you do not have an account, please click on the \"Sign Up Now\" link. Current as of: September 8, 2021               Content Version: 13.2  © 7830-9587 [employer]. Care instructions adapted under license by [employer]. If you have questions about a medical condition or this instruction, always ask your healthcare professional. [address] any warranty or liability for your use of this information. Discussed signs and symptoms which require immediate follow-up in ED/call to 911. Patient verbalized understanding.

## 2022-06-11 ENCOUNTER — TELEPHONE (OUTPATIENT)
Dept: PRIMARY CARE CLINIC | Age: 67
End: 2022-06-11

## 2022-06-11 LAB — THROAT CULTURE: NORMAL

## 2022-06-11 NOTE — TELEPHONE ENCOUNTER
Called and advised pt's daughter Randa Sandy of her moms throat culture result (Neg) and she will relay the msg to her mom.

## 2022-06-14 ENCOUNTER — OFFICE VISIT (OUTPATIENT)
Dept: PRIMARY CARE CLINIC | Age: 67
End: 2022-06-14
Payer: COMMERCIAL

## 2022-06-14 VITALS
HEIGHT: 66 IN | WEIGHT: 290.6 LBS | TEMPERATURE: 98.7 F | OXYGEN SATURATION: 98 % | RESPIRATION RATE: 18 BRPM | HEART RATE: 95 BPM | SYSTOLIC BLOOD PRESSURE: 126 MMHG | BODY MASS INDEX: 46.7 KG/M2 | DIASTOLIC BLOOD PRESSURE: 64 MMHG

## 2022-06-14 DIAGNOSIS — J01.90 ACUTE NON-RECURRENT SINUSITIS, UNSPECIFIED LOCATION: Primary | ICD-10-CM

## 2022-06-14 LAB
INFLUENZA A ANTIBODY: NORMAL
INFLUENZA B ANTIBODY: NORMAL
Lab: NORMAL
PERFORMING INSTRUMENT: NORMAL
QC PASS/FAIL: NORMAL
SARS-COV-2, POC: NORMAL

## 2022-06-14 PROCEDURE — 87804 INFLUENZA ASSAY W/OPTIC: CPT | Performed by: INTERNAL MEDICINE

## 2022-06-14 PROCEDURE — 1123F ACP DISCUSS/DSCN MKR DOCD: CPT | Performed by: INTERNAL MEDICINE

## 2022-06-14 PROCEDURE — 99213 OFFICE O/P EST LOW 20 MIN: CPT | Performed by: INTERNAL MEDICINE

## 2022-06-14 PROCEDURE — 87426 SARSCOV CORONAVIRUS AG IA: CPT | Performed by: INTERNAL MEDICINE

## 2022-06-14 RX ORDER — AMOXICILLIN AND CLAVULANATE POTASSIUM 875; 125 MG/1; MG/1
1 TABLET, FILM COATED ORAL 2 TIMES DAILY
Qty: 20 TABLET | Refills: 0 | Status: SHIPPED | OUTPATIENT
Start: 2022-06-14 | End: 2022-06-24

## 2022-06-14 RX ORDER — FLUTICASONE PROPIONATE 50 MCG
2 SPRAY, SUSPENSION (ML) NASAL DAILY
Qty: 1 EACH | Refills: 1 | Status: SHIPPED | OUTPATIENT
Start: 2022-06-14

## 2022-06-14 ASSESSMENT — ENCOUNTER SYMPTOMS
ABDOMINAL PAIN: 0
SORE THROAT: 0
SINUS PRESSURE: 1
DIARRHEA: 0
SHORTNESS OF BREATH: 0
SINUS PAIN: 1
COUGH: 1
RHINORRHEA: 0
WHEEZING: 0
NAUSEA: 0
VOMITING: 0

## 2022-06-14 NOTE — PROGRESS NOTES
Subjective:      Patient ID: Katie Hassan is a 69 y.o. female      Sinus congestion x 1 week    HPI  Pt presents with 1 weeks of sinus and chest congestion. There's been assoc cough productive of yellow sputum. No chest pain, no SOB or wheezing. Assoc fever and chills. No sore throat. No generalized body aches. Not yet  vaccinated against COVID. Past Medical History:   Diagnosis Date    Asthma     COPD (chronic obstructive pulmonary disease) ([facility] [place] [facility]     Depression     Diabetes mellitus ([facility] [place] [facility]      Past Surgical History:   Procedure Laterality Date    CHOLECYSTECTOMY      COLONOSCOPY N/A 2021    COLORECTAL CANCER SCREENING with polypectomies performed by Doretha Lubin MD at [facility] Marital status:      Spouse name: Not on file    Number of children: Not on file    Years of education: Not on file    Highest education level: Not on file   Occupational History    Not on file   Tobacco Use    Smoking status: Former Smoker     Packs/day: 1.00     Years: 40.00     Pack years: 40.00     Types: Cigarettes     Start date:      Quit date:      Years since quittin.4    Smokeless tobacco: Former User     Quit date:    Vaping Use    Vaping Use: Never used   Substance and Sexual Activity    Alcohol use: Not Currently     Comment: occass    Drug use: Never    Sexual activity: Not on file   Other Topics Concern    Not on file   Social History Narrative    Not on file     Social Determinants of Health     Financial Resource Strain: Low Risk     Difficulty of Paying Living Expenses: Not hard at all   Food Insecurity: No Food Insecurity    Worried About [address] in the Last Year: Never true    [address] in the Last Year: Never true   Transportation Needs: No Transportation Needs    Lack of Transportation (Medical): No    Lack of Transportation (Non-Medical):  No   Physical Activity:     Days of Exercise per Week: Not on file    Minutes of Exercise per Session: Not on file   Stress:     Feeling of Stress : Not on file   Social Connections:     Frequency of Communication with Friends and Family: Not on file    Frequency of Social Gatherings with Friends and Family: Not on file    Attends Orthodox Services: Not on file    Active Member of Clubs or Organizations: Not on file    Attends Club or Organization Meetings: Not on file    Marital Status: Not on file   Intimate Partner Violence:     Fear of Current or Ex-Partner: Not on file    Emotionally Abused: Not on file    Physically Abused: Not on file    Sexually Abused: Not on file   Housing Stability:     Unable to Pay for Housing in the Last Year: Not on file    Number of [place] in the Last Year: Not on file    Unstable Housing in the Last Year: Not on file     Family History   Problem Relation Age of Onset    Breast Cancer Mother     Colon Cancer Neg Hx      Allergies:  Budesonide-formoterol fumarate  Patient Active Problem List   Diagnosis    Left leg weakness    Inflammation of left sacroiliac joint ([address]    Piriformis syndrome of left side    Acute back pain with sciatica, left    Intervertebral disc stenosis of neural canal of lumbar region    Postlaminectomy syndrome, lumbar region    Adenomatous polyp of colon    Adenomatous polyp of ascending colon    Adenomatous polyp of transverse colon    Adenomatous polyp of sigmoid colon    Nicotine dependence    FROYLAN treated with BiPAP    Asthma with COPD with exacerbation ([address]    Controlled type 2 diabetes mellitus without complication, without long-term current use of insulin ([address]    Morbid obesity due to excess calories ([address]     Current Outpatient Medications on File Prior to Visit   Medication Sig Dispense Refill    nystatin (MYCOSTATIN) 164443 UNIT/ML suspension Take 5 mLs by mouth 4 times daily for 14 days Swish and swallow over 1-2 minutes.  280 mL 0    glimepiride (AMARYL) 1 MG tablet Take 3 tablets by mouth every morning 90 tablet 3    Lancets (ONETOUCH DELICA PLUS [id number]) MISC use as directed to Test fasting sugar and after largest meal of the day 200 each 10    torsemide (DEMADEX) 20 MG tablet Take 1 tablet by mouth daily 90 tablet 3    montelukast (SINGULAIR) 10 MG tablet Take 1 tablet by mouth nightly 90 tablet 3    metFORMIN (GLUCOPHAGE) 500 MG tablet Take 2 tablets by mouth Daily with supper 180 tablet 3    cholestyramine (QUESTRAN) 4 g packet Take 1 packet by mouth 2 times daily 30 packet 2    blood glucose monitor strips DX: diabetes mellitus. Use fasting and after largest meal of the day - Ok to substitute per insurance 100 strip 10    DULoxetine (CYMBALTA) 30 MG extended release capsule Take 3 capsules by mouth daily 180 capsule 3    meclizine (ANTIVERT) 25 MG tablet Take 1 tablet by mouth 3 times daily as needed for Dizziness 30 tablet 0    Fluticasone-Umeclidin-Vilant (TRELEGY ELLIPTA) 200-62.5-25 MCG/INH AEPB Inhale 1 puff into the lungs daily 3 each 1    Blood Glucose Monitoring Suppl (ONETOUCH VERIO FLEX SYSTEM) w/Device KIT USE AS DIRECTED      methocarbamol (ROBAXIN) 500 MG tablet Take 1 tablet by mouth twice a day      vitamin B-12 (CYANOCOBALAMIN) 500 MCG tablet Take 500 mcg by mouth daily      Omega-3 Fatty Acids (FISH OIL) 1000 MG CAPS Take 1 capsule by mouth 3 times daily 270 capsule 3    acetaminophen (TYLENOL) 500 MG tablet       blood glucose monitor kit and supplies Dispense sufficient amount for indicated testing frequency plus additional to accommodate PRN testing needs. Dispense all needed supplies to include: monitor, strips, lancing device, lancets, control solutions, alcohol swabs.  1 kit 0    albuterol (PROVENTIL) (2.5 MG/3ML) 0.083% nebulizer solution Take 2.5 mg by nebulization every 6 hours as needed for Wheezing      albuterol sulfate HFA (VENTOLIN HFA) 108 (90 Base) MCG/ACT inhaler Inhale 2 puffs into the lungs every 6 hours as needed for Wheezing      gabapentin (NEURONTIN) 100 MG capsule Take 1 capsule by mouth 3 times daily for 90 days. 270 capsule 3     No current facility-administered medications on file prior to visit. Review of Systems   Constitutional: Positive for chills and fever. Negative for diaphoresis and fatigue. HENT: Positive for congestion, sinus pressure and sinus pain. Negative for ear discharge, ear pain, rhinorrhea, sneezing and sore throat. Respiratory: Positive for cough. Negative for shortness of breath and wheezing. Cardiovascular: Negative for chest pain. Gastrointestinal: Negative for abdominal pain, diarrhea, nausea and vomiting. Endocrine: Negative for cold intolerance and heat intolerance. Genitourinary: Negative for dysuria and frequency. Neurological: Negative for dizziness and light-headedness. Objective:   /64   Pulse 95   Temp 98.7 °F (37.1 °C)   Resp 18   Ht 5' 6\" (1.676 m)   Wt 290 lb 9.6 oz (131.8 kg)   LMP  (LMP Unknown)   SpO2 98%   BMI 46.90 kg/m²     Physical Exam  Constitutional:       General: She is not in acute distress. Appearance: She is not diaphoretic. HENT:      Head:      Comments: No TM or pharyngeal erythema  Frontal and maxillary sinus TTP  Cardiovascular:      Rate and Rhythm: Normal rate and regular rhythm. Pulses: Normal pulses. Heart sounds: Normal heart sounds, S1 normal and S2 normal.   Pulmonary:      Effort: Pulmonary effort is normal. No respiratory distress. Breath sounds: Normal breath sounds. No wheezing or rales. Chest:      Chest wall: No tenderness. Abdominal:      General: Bowel sounds are normal.      Tenderness: There is no abdominal tenderness. Neurological:      Mental Status: She is alert. Assessment:       Diagnosis Orders   1.  Acute non-recurrent sinusitis, unspecified location  POCT Influenza A/B    POCT COVID-19, Antigen    amoxicillin-clavulanate (AUGMENTIN) 875-125 MG per tablet fluticasone (FLONASE) 50 MCG/ACT nasal spray     Plan:      Orders Placed This Encounter   Procedures    POCT Influenza A/B    POCT COVID-19, Antigen     Order Specific Question:   Is this test for diagnosis or screening? Answer:   Diagnosis of ill patient     Order Specific Question:   Symptomatic for COVID-19 as defined by CDC? Answer:   Yes     Order Specific Question:   Date of Symptom Onset     Answer:   6/7/2022     Order Specific Question:   Hospitalized for COVID-19? Answer:   No     Order Specific Question:   Admitted to ICU for COVID-19? Answer:   No     Order Specific Question:   Employed in healthcare setting? Answer:   Unknown     Order Specific Question:   Resident in a congregate (group) care setting? Answer:   Unknown     Order Specific Question:   Pregnant? Answer:   No     Order Specific Question:   Previously tested for COVID-19? Answer:   Yes      Results for POC orders placed in visit on 06/14/22   POCT Influenza A/B   Result Value Ref Range    Influenza A Ab neg     Influenza B Ab neg    COVID negative. Return if symptoms worsen or fail to improve, for follow up, with PCP.

## 2022-06-24 DIAGNOSIS — J44.9 CHRONIC OBSTRUCTIVE PULMONARY DISEASE, UNSPECIFIED COPD TYPE (HCC): ICD-10-CM

## 2022-06-24 RX ORDER — FLUTICASONE FUROATE, UMECLIDINIUM BROMIDE AND VILANTEROL TRIFENATATE 200; 62.5; 25 UG/1; UG/1; UG/1
1 POWDER RESPIRATORY (INHALATION) DAILY
Qty: 2 EACH | Refills: 0 | COMMUNITY
Start: 2022-06-24 | End: 2022-09-04 | Stop reason: SDUPTHER

## 2022-07-06 ENCOUNTER — OFFICE VISIT (OUTPATIENT)
Dept: PULMONOLOGY | Age: 67
End: 2022-07-06
Payer: COMMERCIAL

## 2022-07-06 ENCOUNTER — HOSPITAL ENCOUNTER (OUTPATIENT)
Dept: GENERAL RADIOLOGY | Age: 67
Discharge: HOME | End: 2022-07-08
Payer: COMMERCIAL

## 2022-07-06 VITALS
BODY MASS INDEX: 46.83 KG/M2 | SYSTOLIC BLOOD PRESSURE: 124 MMHG | HEART RATE: 90 BPM | TEMPERATURE: 96.9 F | WEIGHT: 291.4 LBS | RESPIRATION RATE: 16 BRPM | HEIGHT: 66 IN | OXYGEN SATURATION: 97 % | DIASTOLIC BLOOD PRESSURE: 72 MMHG

## 2022-07-06 DIAGNOSIS — J44.9 CHRONIC OBSTRUCTIVE PULMONARY DISEASE, UNSPECIFIED COPD TYPE (HCC): ICD-10-CM

## 2022-07-06 DIAGNOSIS — E66.01 CLASS 3 SEVERE OBESITY DUE TO EXCESS CALORIES WITH SERIOUS COMORBIDITY AND BODY MASS INDEX (BMI) OF 50.0 TO 59.9 IN ADULT (HCC): ICD-10-CM

## 2022-07-06 DIAGNOSIS — R05.9 COUGH: ICD-10-CM

## 2022-07-06 DIAGNOSIS — Z87.891 HISTORY OF SMOKING: ICD-10-CM

## 2022-07-06 DIAGNOSIS — G47.33 OSA (OBSTRUCTIVE SLEEP APNEA): ICD-10-CM

## 2022-07-06 DIAGNOSIS — I51.89 DIASTOLIC DYSFUNCTION: ICD-10-CM

## 2022-07-06 DIAGNOSIS — R05.9 COUGH: Primary | ICD-10-CM

## 2022-07-06 PROCEDURE — G8399 PT W/DXA RESULTS DOCUMENT: HCPCS | Performed by: INTERNAL MEDICINE

## 2022-07-06 PROCEDURE — 1123F ACP DISCUSS/DSCN MKR DOCD: CPT | Performed by: INTERNAL MEDICINE

## 2022-07-06 PROCEDURE — 3017F COLORECTAL CA SCREEN DOC REV: CPT | Performed by: INTERNAL MEDICINE

## 2022-07-06 PROCEDURE — G8427 DOCREV CUR MEDS BY ELIG CLIN: HCPCS | Performed by: INTERNAL MEDICINE

## 2022-07-06 PROCEDURE — 1090F PRES/ABSN URINE INCON ASSESS: CPT | Performed by: INTERNAL MEDICINE

## 2022-07-06 PROCEDURE — 71046 X-RAY EXAM CHEST 2 VIEWS: CPT

## 2022-07-06 PROCEDURE — G8417 CALC BMI ABV UP PARAM F/U: HCPCS | Performed by: INTERNAL MEDICINE

## 2022-07-06 PROCEDURE — 99214 OFFICE O/P EST MOD 30 MIN: CPT | Performed by: INTERNAL MEDICINE

## 2022-07-06 PROCEDURE — 3023F SPIROM DOC REV: CPT | Performed by: INTERNAL MEDICINE

## 2022-07-06 PROCEDURE — 1036F TOBACCO NON-USER: CPT | Performed by: INTERNAL MEDICINE

## 2022-07-06 RX ORDER — FLUTICASONE FUROATE, UMECLIDINIUM BROMIDE AND VILANTEROL TRIFENATATE 100; 62.5; 25 UG/1; UG/1; UG/1
1 POWDER RESPIRATORY (INHALATION) DAILY
Qty: 2 EACH | Refills: 0 | COMMUNITY
Start: 2022-07-06 | End: 2022-08-08 | Stop reason: SDUPTHER

## 2022-07-06 RX ORDER — ROSUVASTATIN CALCIUM 20 MG/1
TABLET, COATED ORAL
COMMUNITY
Start: 2022-06-26 | End: 2022-08-23 | Stop reason: SDUPTHER

## 2022-07-06 NOTE — PROGRESS NOTES
Subjective:     Zachery Ferro is a 69 y.o. female who complains today of:     Chief Complaint   Patient presents with    Follow-up     6 Month F/U for COPD and FROYLAN    Results     CT Lung Screening       HPI  Patient presents for  COPD and FROYLAN    She is doing okay in general, mild dyspnea on exertion, no chest pain, she has cough productive of clear phlegm, worse in the morning, occasional nasal congestion, no heartburn, no fever or chills, currently no lower extremity edema, but she does get lower extremity edema occasionally, she is compliant with CPAP and uses it every day, however she reports sleeping for long hours 10 to 12 hours to get sufficient refreshing sleep, during the day she has no excessive daytime sleepiness or tiredness. She is not sure if she is taking Neurontin or not, no fever or chills.          Allergies:  Budesonide-formoterol fumarate  Past Medical History:   Diagnosis Date    Asthma     [facility]     Depression     Diabetes mellitus ([facility]     Sleep apnea      Past Surgical History:   Procedure Laterality Date    CHOLECYSTECTOMY      COLONOSCOPY N/A 2021    COLORECTAL CANCER SCREENING with polypectomies performed by John Vu MD at [facility]     Family History   Problem Relation Age of Onset    Breast Cancer Mother     Colon Cancer Neg Hx      Social History     Socioeconomic History    Marital status:      Spouse name: Not on file    Number of children: Not on file    Years of education: Not on file    Highest education level: Not on file   Occupational History    Not on file   Tobacco Use    Smoking status: Former Smoker     Packs/day: 1.00     Years: 40.00     Pack years: 40.00     Types: Cigarettes     Start date:      Quit date:      Years since quittin.5    Smokeless tobacco: Former User     Quit date:    Vaping Use    Vaping Use: Never used   Substance and Sexual Activity    Alcohol use: Not Currently     Comment: occass    Drug use: Never    Sexual activity: Not on file   Other Topics Concern    Not on file   Social History Narrative    Not on file     Social Determinants of Health     Financial Resource Strain: Low Risk     Difficulty of Paying Living Expenses: Not hard at all   Food Insecurity: No Food Insecurity    Worried About Running Out of Food in the Last Year: Never true    [place] of Food in the Last Year: Never true   Transportation Needs: No Transportation Needs    Lack of Transportation (Medical): No    Lack of Transportation (Non-Medical): No   Physical Activity:     Days of Exercise per Week: Not on file    Minutes of Exercise per Session: Not on file   Stress:     Feeling of Stress : Not on file   Social Connections:     Frequency of Communication with Friends and Family: Not on file    Frequency of Social Gatherings with Friends and Family: Not on file    Attends Voodoo Services: Not on file    Active Member of [address] [place] [address] or Organizations: Not on file    Attends Club or Organization Meetings: Not on file    Marital Status: Not on file   Intimate Partner Violence:     Fear of Current or Ex-Partner: Not on file    Emotionally Abused: Not on file    Physically Abused: Not on file    Sexually Abused: Not on file   Housing Stability:     Unable to Pay for Housing in the Last Year: Not on file    Number of [place] in the Last Year: Not on file    Unstable Housing in the Last Year: Not on file         Review of Systems      ROS: 10 organs review of system is done including general, psychological, ENT, hematological, endocrine, respiratory, cardiovascular, gastrointestinal,musculoskeletal, neurological,  allergy and Immunology is done and is otherwise negative.     Current Outpatient Medications   Medication Sig Dispense Refill    rosuvastatin (CRESTOR) 20 MG tablet TAKE 1 TABLET BY MOUTH NIGHTLY      Fluticasone-Umeclidin-Vilant (TRELEGY ELLIPTA) 200-62.5-25 MCG/INH AEPB Inhale 1 puff into the lungs daily 2 each 0    fluticasone (FLONASE) 50 MCG/ACT nasal spray 2 sprays by Nasal route daily 1 each 1    glimepiride (AMARYL) 1 MG tablet Take 3 tablets by mouth every morning 90 tablet 3    Lancets (ONETOUCH DELICA PLUS [id number]) MISC use as directed to Test fasting sugar and after largest meal of the day 200 each 10    torsemide (DEMADEX) 20 MG tablet Take 1 tablet by mouth daily 90 tablet 3    montelukast (SINGULAIR) 10 MG tablet Take 1 tablet by mouth nightly 90 tablet 3    metFORMIN (GLUCOPHAGE) 500 MG tablet Take 2 tablets by mouth Daily with supper 180 tablet 3    blood glucose monitor strips DX: diabetes mellitus. Use fasting and after largest meal of the day - Ok to substitute per insurance 100 strip 10    DULoxetine (CYMBALTA) 30 MG extended release capsule Take 3 capsules by mouth daily 180 capsule 3    meclizine (ANTIVERT) 25 MG tablet Take 1 tablet by mouth 3 times daily as needed for Dizziness 30 tablet 0    Blood Glucose Monitoring Suppl (ONETOUCH VERIO FLEX SYSTEM) w/Device KIT USE AS DIRECTED      methocarbamol (ROBAXIN) 500 MG tablet Take 1 tablet by mouth twice a day      vitamin B-12 (CYANOCOBALAMIN) 500 MCG tablet Take 500 mcg by mouth daily      Omega-3 Fatty Acids (FISH OIL) 1000 MG CAPS Take 1 capsule by mouth 3 times daily 270 capsule 3    acetaminophen (TYLENOL) 500 MG tablet       blood glucose monitor kit and supplies Dispense sufficient amount for indicated testing frequency plus additional to accommodate PRN testing needs. Dispense all needed supplies to include: monitor, strips, lancing device, lancets, control solutions, alcohol swabs.  1 kit 0    albuterol (PROVENTIL) (2.5 MG/3ML) 0.083% nebulizer solution Take 2.5 mg by nebulization every 6 hours as needed for Wheezing      albuterol sulfate HFA (VENTOLIN HFA) 108 (90 Base) MCG/ACT inhaler Inhale 2 puffs into the lungs every 6 hours as needed for Wheezing      2021, FEV1 77%, FEV1/FVC 0.62  ECHO: April 2021, EF 02% with diastolic dysfunction  Sleep study AHI 14.5  Assessment and Plan       Diagnosis Orders   1. Cough  XR CHEST (2 VW)   2. Chronic obstructive pulmonary disease, unspecified COPD type (HCC)  Pulse oximetry, overnight   3. FROYLAN (obstructive sleep apnea)     4. Class 3 severe obesity due to excess calories with serious comorbidity and body mass index (BMI) of 50.0 to 59.9 in adult ([facility]     5. Diastolic dysfunction     6. History of smoking       · Cough, chronic, likely chronic bronchitis, no infiltrate on chest x-ray, continue current inhalers and monitor  · COPD, continue Trelegy, yearly flu shot  · Continue CPAP while asleep, will reorder nocturnal pulse oximetry to rule out need for oxygen while on CPAP  · Weight loss is recommended  · Continue cardioprotective medications and avoid volume overload      Orders Placed This Encounter   Procedures    XR CHEST (2 VW)     Standing Status:   Future     Standing Expiration Date:   7/6/2023    Pulse oximetry, overnight     Standing Status:   Future     Standing Expiration Date:   1/6/2024     No orders of the defined types were placed in this encounter. Discussed with patient the importance of exercise and weight control and  overall health and well-being. Reviewed with the patient: current clinical status, medications, activities and diet. Side effects, adverse effects of the medication prescribed today, as well as treatment plan and result expectations have been discussed with the patient who expresses understanding and desires to proceed. Return in about 4 months (around 11/6/2022).       Poonam El MD

## 2022-07-13 ENCOUNTER — OFFICE VISIT (OUTPATIENT)
Dept: PRIMARY CARE CLINIC | Age: 67
End: 2022-07-13

## 2022-07-13 ENCOUNTER — TELEPHONE (OUTPATIENT)
Dept: PRIMARY CARE CLINIC | Age: 67
End: 2022-07-13

## 2022-07-13 VITALS
HEART RATE: 89 BPM | SYSTOLIC BLOOD PRESSURE: 118 MMHG | RESPIRATION RATE: 18 BRPM | WEIGHT: 293 LBS | BODY MASS INDEX: 47.09 KG/M2 | HEIGHT: 66 IN | OXYGEN SATURATION: 99 % | DIASTOLIC BLOOD PRESSURE: 68 MMHG | TEMPERATURE: 97.8 F

## 2022-07-13 DIAGNOSIS — M46.1 INFLAMMATION OF LEFT SACROILIAC JOINT (HCC): ICD-10-CM

## 2022-07-13 DIAGNOSIS — M10.272 ACUTE DRUG-INDUCED GOUT INVOLVING TOE OF LEFT FOOT: Primary | ICD-10-CM

## 2022-07-13 DIAGNOSIS — M79.675 GREAT TOE PAIN, LEFT: ICD-10-CM

## 2022-07-13 DIAGNOSIS — K51.40 PSEUDOPOLYPOSIS OF COLON WITHOUT COMPLICATION, UNSPECIFIED PART OF COLON (HCC): ICD-10-CM

## 2022-07-13 DIAGNOSIS — Z00.00 MEDICARE ANNUAL WELLNESS VISIT, SUBSEQUENT: Primary | ICD-10-CM

## 2022-07-13 DIAGNOSIS — F33.0 MILD EPISODE OF RECURRENT MAJOR DEPRESSIVE DISORDER (HCC): ICD-10-CM

## 2022-07-13 DIAGNOSIS — I50.9 CONGESTIVE HEART FAILURE, UNSPECIFIED HF CHRONICITY, UNSPECIFIED HEART FAILURE TYPE (HCC): ICD-10-CM

## 2022-07-13 PROCEDURE — G8399 PT W/DXA RESULTS DOCUMENT: HCPCS | Performed by: INTERNAL MEDICINE

## 2022-07-13 PROCEDURE — 1090F PRES/ABSN URINE INCON ASSESS: CPT | Performed by: INTERNAL MEDICINE

## 2022-07-13 PROCEDURE — 1036F TOBACCO NON-USER: CPT | Performed by: INTERNAL MEDICINE

## 2022-07-13 PROCEDURE — G8427 DOCREV CUR MEDS BY ELIG CLIN: HCPCS | Performed by: INTERNAL MEDICINE

## 2022-07-13 PROCEDURE — G8417 CALC BMI ABV UP PARAM F/U: HCPCS | Performed by: INTERNAL MEDICINE

## 2022-07-13 PROCEDURE — 1123F ACP DISCUSS/DSCN MKR DOCD: CPT | Performed by: INTERNAL MEDICINE

## 2022-07-13 PROCEDURE — 3017F COLORECTAL CA SCREEN DOC REV: CPT | Performed by: INTERNAL MEDICINE

## 2022-07-13 PROCEDURE — G0439 PPPS, SUBSEQ VISIT: HCPCS | Performed by: INTERNAL MEDICINE

## 2022-07-13 PROCEDURE — 99214 OFFICE O/P EST MOD 30 MIN: CPT | Performed by: INTERNAL MEDICINE

## 2022-07-13 RX ORDER — PREDNISONE 20 MG/1
40 TABLET ORAL DAILY
Qty: 14 TABLET | Refills: 0 | Status: SHIPPED | OUTPATIENT
Start: 2022-07-13 | End: 2022-07-20

## 2022-07-13 ASSESSMENT — COLUMBIA-SUICIDE SEVERITY RATING SCALE - C-SSRS
6. HAVE YOU EVER DONE ANYTHING, STARTED TO DO ANYTHING, OR PREPARED TO DO ANYTHING TO END YOUR LIFE?: NO
1. WITHIN THE PAST MONTH, HAVE YOU WISHED YOU WERE DEAD OR WISHED YOU COULD GO TO SLEEP AND NOT WAKE UP?: NO
2. HAVE YOU ACTUALLY HAD ANY THOUGHTS OF KILLING YOURSELF?: NO

## 2022-07-13 ASSESSMENT — PATIENT HEALTH QUESTIONNAIRE - PHQ9
SUM OF ALL RESPONSES TO PHQ QUESTIONS 1-9: 0
SUM OF ALL RESPONSES TO PHQ QUESTIONS 1-9: 0
7. TROUBLE CONCENTRATING ON THINGS, SUCH AS READING THE NEWSPAPER OR WATCHING TELEVISION: 0
2. FEELING DOWN, DEPRESSED OR HOPELESS: 0
SUM OF ALL RESPONSES TO PHQ QUESTIONS 1-9: 0
9. THOUGHTS THAT YOU WOULD BE BETTER OFF DEAD, OR OF HURTING YOURSELF: 0
4. FEELING TIRED OR HAVING LITTLE ENERGY: 0
5. POOR APPETITE OR OVEREATING: 0
8. MOVING OR SPEAKING SO SLOWLY THAT OTHER PEOPLE COULD HAVE NOTICED. OR THE OPPOSITE, BEING SO FIGETY OR RESTLESS THAT YOU HAVE BEEN MOVING AROUND A LOT MORE THAN USUAL: 0
SUM OF ALL RESPONSES TO PHQ9 QUESTIONS 1 & 2: 0
SUM OF ALL RESPONSES TO PHQ QUESTIONS 1-9: 0
10. IF YOU CHECKED OFF ANY PROBLEMS, HOW DIFFICULT HAVE THESE PROBLEMS MADE IT FOR YOU TO DO YOUR WORK, TAKE CARE OF THINGS AT HOME, OR GET ALONG WITH OTHER PEOPLE: 0
3. TROUBLE FALLING OR STAYING ASLEEP: 0
1. LITTLE INTEREST OR PLEASURE IN DOING THINGS: 0
6. FEELING BAD ABOUT YOURSELF - OR THAT YOU ARE A FAILURE OR HAVE LET YOURSELF OR YOUR FAMILY DOWN: 0

## 2022-07-13 ASSESSMENT — ENCOUNTER SYMPTOMS
DIARRHEA: 0
WHEEZING: 0
NAUSEA: 0
VOMITING: 0
COUGH: 0
SHORTNESS OF BREATH: 0
SINUS PRESSURE: 0
ABDOMINAL PAIN: 0

## 2022-07-13 NOTE — PATIENT INSTRUCTIONS
Personalized Preventive Plan for Otander Leaks - 7/13/2022  Medicare offers a range of preventive health benefits. Some of the tests and screenings are paid in full while other may be subject to a deductible, co-insurance, and/or copay. Some of these benefits include a comprehensive review of your medical history including lifestyle, illnesses that may run in your family, and various assessments and screenings as appropriate. After reviewing your medical record and screening and assessments performed today your provider may have ordered immunizations, labs, imaging, and/or referrals for you. A list of these orders (if applicable) as well as your Preventive Care list are included within your After Visit Summary for your review. Other Preventive Recommendations:    · A preventive eye exam performed by an eye specialist is recommended every 1-2 years to screen for glaucoma; cataracts, macular degeneration, and other eye disorders. · A preventive dental visit is recommended every 6 months. · Try to get at least 150 minutes of exercise per week or 10,000 steps per day on a pedometer . · Order or download the FREE \"Exercise & Physical Activity: Your Everyday Guide\" from [employer]. Call 5-267.895.4465 or search [employer] online. · You need 3374-4786 mg of calcium and 7320-9303 IU of vitamin D per day. It is possible to meet your calcium requirement with diet alone, but a vitamin D supplement is usually necessary to meet this goal.  · When exposed to the sun, use a sunscreen that protects against both UVA and UVB radiation with an SPF of 30 or greater. Reapply every 2 to 3 hours or after sweating, drying off with a towel, or swimming. · Always wear a seat belt when traveling in a car. Always wear a helmet when riding a bicycle or motorcycle.

## 2022-07-13 NOTE — PROGRESS NOTES
No  Do you have difficulty driving, watching TV, or doing any of your daily activities because of your eyesight?: No  Have you had an eye exam within the past year?: (!) No  No exam data present    Hearing/Vision Interventions:  · none            Objective   There were no vitals filed for this visit. There is no height or weight on file to calculate BMI. General Appearance: alert and oriented to person, place and time, well developed and well- nourished, in no acute distress  Skin: warm and dry, no rash or erythema  Head: normocephalic and atraumatic  Eyes: pupils equal, round, and reactive to light, extraocular eye movements intact, conjunctivae normal  ENT: tympanic membrane, external ear and ear canal normal bilaterally, nose without deformity, nasal mucosa and turbinates normal without polyps  Neck: supple and non-tender without mass, no thyromegaly or thyroid nodules, no cervical lymphadenopathy  Pulmonary/Chest: clear to auscultation bilaterally- no wheezes, rales or rhonchi, normal air movement, no respiratory distress  Cardiovascular: normal rate, regular rhythm, normal S1 and S2, no murmurs, rubs, clicks, or gallops, distal pulses intact, no carotid bruits  Abdomen: soft, non-tender, non-distended, normal bowel sounds, no masses or organomegaly  Extremities: no cyanosis, clubbing or edema  Musculoskeletal: normal range of motion, no joint swelling, deformity or tenderness  Neurologic: reflexes normal and symmetric, no cranial nerve deficit, gait, coordination and speech normal       Allergies   Allergen Reactions    Budesonide-Formoterol Fumarate      Other reaction(s): THRUSH  Other reaction(s): Other: See Comments  thrush     Prior to Visit Medications    Medication Sig Taking?  Authorizing Provider   rosuvastatin (CRESTOR) 20 MG tablet TAKE 1 TABLET BY MOUTH NIGHTLY Yes Historical Provider, MD   fluticasone-umeclidin-vilant (TRELEGY ELLIPTA) 100-62.5-25 MCG/INH AEPB Inhale 1 puff into the lungs daily uf4h    10/2023 Yes Calli Fairbanks MD   Fluticasone-Umeclidin-Vilant (TRELEGY ELLIPTA) 200-62.5-25 MCG/INH AEPB Inhale 1 puff into the lungs daily Yes Calli Fairbanks MD   fluticasone (FLONASE) 50 MCG/ACT nasal spray 2 sprays by Nasal route daily Yes Jaky Harris MD   glimepiride (AMARYL) 1 MG tablet Take 3 tablets by mouth every morning Yes Jaky Harris MD   Lancets (Amos Null) MISC use as directed to Test fasting sugar and after largest meal of the day Yes Jaky Harris MD   torsemide (DEMADEX) 20 MG tablet Take 1 tablet by mouth daily Yes Jaky Harris MD   montelukast (SINGULAIR) 10 MG tablet Take 1 tablet by mouth nightly Yes Jaky Harris MD   metFORMIN (GLUCOPHAGE) 500 MG tablet Take 2 tablets by mouth Daily with supper Yes Jaky Harris MD   cholestyramine (QUESTRAN) 4 g packet Take 1 packet by mouth 2 times daily Yes Fransico Murcia MD   blood glucose monitor strips DX: diabetes mellitus. Use fasting and after largest meal of the day - Ok to substitute per insurance Yes Jaky Harris MD   DULoxetine (CYMBALTA) 30 MG extended release capsule Take 3 capsules by mouth daily Yes Jaky Harris MD   meclizine (ANTIVERT) 25 MG tablet Take 1 tablet by mouth 3 times daily as needed for Dizziness Yes Jaky Harris MD   Blood Glucose Monitoring Suppl ([address]) w/Device KIT USE AS DIRECTED Yes Historical Provider, MD   methocarbamol (ROBAXIN) 500 MG tablet Take 1 tablet by mouth twice a day Yes Historical Provider, MD   vitamin B-12 (CYANOCOBALAMIN) 500 MCG tablet Take 500 mcg by mouth daily Yes Historical Provider, MD   Omega-3 Fatty Acids (FISH OIL) 1000 MG CAPS Take 1 capsule by mouth 3 times daily Yes Jaky Harris MD   acetaminophen (TYLENOL) 500 MG tablet  Yes Historical Provider, MD   blood glucose monitor kit and supplies Dispense sufficient amount for indicated testing frequency plus additional to accommodate PRN testing needs.  Dispense all needed supplies to include: monitor, strips,

## 2022-07-13 NOTE — PROGRESS NOTES
Subjective:      Patient ID: Natalie Saldana is a 69 y.o. female    Left great toe pain x 3 days  HPI  Pt presents for sudden onset left great toe sharp burning pain rated 10/10, nonradiating. No swelling, no antecedent trauma. Attests to frequent red meat and occasional alcohol consumption.         CHF- controlled  Mild major depression-controlled   Pseudopolypsosi of colon-stable   Inflammation of sacroiliac joint-controlled   Hemoglobin A1C (%)   Date Value   2022 7.3 (H)   10/06/2021 7.5 (H)   2021 7.6 (H)   2021 7.5 (H)     Past Medical History:   Diagnosis Date    Asthma     [facility]     Depression     Diabetes mellitus ([facility]     Sleep apnea      Past Surgical History:   Procedure Laterality Date    CHOLECYSTECTOMY      COLONOSCOPY N/A 2021    COLORECTAL CANCER SCREENING with polypectomies performed by Connie Murphy MD at [facility] Marital status:      Spouse name: Not on file    Number of children: Not on file    Years of education: Not on file    Highest education level: Not on file   Occupational History    Not on file   Tobacco Use    Smoking status: Former Smoker     Packs/day: 1.00     Years: 40.00     Pack years: 40.00     Types: Cigarettes     Start date:      Quit date:      Years since quittin.5    Smokeless tobacco: Former User     Quit date:    Vaping Use    Vaping Use: Never used   Substance and Sexual Activity    Alcohol use: Not Currently     Comment: occass    Drug use: Never    Sexual activity: Not on file   Other Topics Concern    Not on file   Social History Narrative    Not on file     Social Determinants of Health     Financial Resource Strain: Low Risk     Difficulty of Paying Living Expenses: Not hard at all   Food Insecurity: No Food Insecurity    Worried About [address] in the Last Year: Never true    Ran Out of Food in the Last Year: Never true   Transportation Needs: No Transportation Needs    Lack of Transportation (Medical): No    Lack of Transportation (Non-Medical):  No   Physical Activity: Inactive    Days of Exercise per Week: 0 days    Minutes of Exercise per Session: 0 min   Stress:     Feeling of Stress : Not on file   Social Connections:     Frequency of Communication with Friends and Family: Not on file    Frequency of Social Gatherings with Friends and Family: Not on file    Attends Islam Services: Not on file    Active Member of Clubs or Organizations: Not on file    Attends Club or Organization Meetings: Not on file    Marital Status: Not on file   Intimate Partner Violence:     Fear of Current or Ex-Partner: Not on file    Emotionally Abused: Not on file    Physically Abused: Not on file    Sexually Abused: Not on file   Housing Stability:     Unable to Pay for Housing in the Last Year: Not on file    Number of [place] in the Last Year: Not on file    Unstable Housing in the Last Year: Not on file     Family History   Problem Relation Age of Onset    Breast Cancer Mother     Colon Cancer Neg Hx      Allergies:  Budesonide-formoterol fumarate  Patient Active Problem List   Diagnosis    Left leg weakness    Inflammation of left sacroiliac joint ([address]    Piriformis syndrome of left side    Acute back pain with sciatica, left    Intervertebral disc stenosis of neural canal of lumbar region    Postlaminectomy syndrome, lumbar region    Adenomatous polyp of colon    Adenomatous polyp of ascending colon    Adenomatous polyp of transverse colon    Adenomatous polyp of sigmoid colon    Nicotine dependence    FROYLAN treated with BiPAP    Asthma with COPD with exacerbation ([address]    Controlled type 2 diabetes mellitus without complication, without long-term current use of insulin ([address]    Morbid obesity due to excess calories ([address]    Congestive heart failure, unspecified HF chronicity, unspecified heart failure type ([facility]    Mild episode of recurrent major depressive disorder (HCC)    Pseudopolyposis of colon without complication, unspecified part of colon ([facility]     Current Outpatient Medications on File Prior to Visit   Medication Sig Dispense Refill    rosuvastatin (CRESTOR) 20 MG tablet TAKE 1 TABLET BY MOUTH NIGHTLY      fluticasone-umeclidin-vilant (TRELEGY ELLIPTA) 100-62.5-25 MCG/INH AEPB Inhale 1 puff into the lungs daily uf4h    10/2023 2 each 0    Fluticasone-Umeclidin-Vilant (TRELEGY ELLIPTA) 200-62.5-25 MCG/INH AEPB Inhale 1 puff into the lungs daily 2 each 0    fluticasone (FLONASE) 50 MCG/ACT nasal spray 2 sprays by Nasal route daily 1 each 1    glimepiride (AMARYL) 1 MG tablet Take 3 tablets by mouth every morning 90 tablet 3    Lancets (ONETOUCH DELICA PLUS [id number]) MISC use as directed to Test fasting sugar and after largest meal of the day 200 each 10    torsemide (DEMADEX) 20 MG tablet Take 1 tablet by mouth daily 90 tablet 3    montelukast (SINGULAIR) 10 MG tablet Take 1 tablet by mouth nightly 90 tablet 3    metFORMIN (GLUCOPHAGE) 500 MG tablet Take 2 tablets by mouth Daily with supper 180 tablet 3    cholestyramine (QUESTRAN) 4 g packet Take 1 packet by mouth 2 times daily 30 packet 2    blood glucose monitor strips DX: diabetes mellitus.  Use fasting and after largest meal of the day - Ok to substitute per insurance 100 strip 10    DULoxetine (CYMBALTA) 30 MG extended release capsule Take 3 capsules by mouth daily 180 capsule 3    meclizine (ANTIVERT) 25 MG tablet Take 1 tablet by mouth 3 times daily as needed for Dizziness 30 tablet 0    Blood Glucose Monitoring Suppl (ONETOUCH VERIO FLEX SYSTEM) w/Device KIT USE AS DIRECTED      methocarbamol (ROBAXIN) 500 MG tablet Take 1 tablet by mouth twice a day      vitamin B-12 (CYANOCOBALAMIN) 500 MCG tablet Take 500 mcg by mouth daily      Omega-3 Fatty Acids (FISH OIL) 1000 MG CAPS Take 1 capsule by mouth 3 times daily 270 capsule 3    acetaminophen (TYLENOL) 500 MG tablet       blood glucose monitor kit and supplies Dispense sufficient amount for indicated testing frequency plus additional to accommodate PRN testing needs. Dispense all needed supplies to include: monitor, strips, lancing device, lancets, control solutions, alcohol swabs. 1 kit 0    albuterol (PROVENTIL) (2.5 MG/3ML) 0.083% nebulizer solution Take 2.5 mg by nebulization every 6 hours as needed for Wheezing      albuterol sulfate HFA (VENTOLIN HFA) 108 (90 Base) MCG/ACT inhaler Inhale 2 puffs into the lungs every 6 hours as needed for Wheezing      gabapentin (NEURONTIN) 100 MG capsule Take 1 capsule by mouth 3 times daily for 90 days. 270 capsule 3     No current facility-administered medications on file prior to visit. Review of Systems   Constitutional: Negative for chills, diaphoresis, fatigue and fever. HENT: Negative for congestion, ear discharge, ear pain and sinus pressure. Respiratory: Negative for cough, shortness of breath and wheezing. Cardiovascular: Negative for chest pain. Gastrointestinal: Negative for abdominal pain, diarrhea, nausea and vomiting. Endocrine: Negative for cold intolerance and heat intolerance. Genitourinary: Negative for dysuria and frequency. Musculoskeletal: Positive for arthralgias. Negative for joint swelling. Neurological: Negative for dizziness and light-headedness. Objective:   /68   Pulse 89   Temp 97.8 °F (36.6 °C)   Resp 18   Ht 5' 6\" (1.676 m)   Wt 294 lb 9.6 oz (133.6 kg)   LMP  (LMP Unknown)   SpO2 99%   BMI 47.55 kg/m²     Physical Exam  Constitutional:       General: She is not in acute distress. Appearance: She is not diaphoretic. Cardiovascular:      Rate and Rhythm: Normal rate and regular rhythm. Pulses: Normal pulses.       Heart sounds: Normal heart sounds, S1 normal and S2 normal.   Pulmonary:      Effort: Pulmonary effort is normal. No respiratory distress. Breath sounds: Normal breath sounds. No wheezing or rales. Chest:      Chest wall: No tenderness. Abdominal:      General: Bowel sounds are normal.      Tenderness: There is no abdominal tenderness. Musculoskeletal:      Comments: B/l foot symmetry  Marked left first MTP and proximal IPJ TTP  Limited ROM in plantar flexion   Neurological:      Mental Status: She is alert. Assessment:       Diagnosis Orders   1. Acute drug-induced gout involving toe of left foot  predniSONE (DELTASONE) 20 MG tablet    Uric Acid    torsemide and high protein diet avoidance recommended    2. Great toe pain, left  XR FOOT LEFT (MIN 3 VIEWS)    ? ? fracture    3. Congestive heart failure, unspecified HF chronicity, unspecified heart failure type ([facility] Controlled    4. Mild episode of recurrent major depressive disorder (HCC) Stable    5. Pseudopolyposis of colon without complication, unspecified part of colon ([facility] Stable    6. Inflammation of left sacroiliac joint (HCC) Stable      Plan:      Orders Placed This Encounter   Procedures    XR FOOT LEFT (MIN 3 VIEWS)     Standing Status:   Future     Number of Occurrences:   1     Standing Expiration Date:   7/13/2023    Uric Acid     Standing Status:   Future     Standing Expiration Date:   7/13/2023     Orders Placed This Encounter   Medications    predniSONE (DELTASONE) 20 MG tablet     Sig: Take 2 tablets by mouth daily for 7 days     Dispense:  14 tablet     Refill:  0   Pt advised to hold torsemide. Return in about 6 weeks (around 8/24/2022).

## 2022-07-14 ENCOUNTER — TELEPHONE (OUTPATIENT)
Dept: PRIMARY CARE CLINIC | Age: 67
End: 2022-07-14

## 2022-07-21 DIAGNOSIS — J44.9 CHRONIC OBSTRUCTIVE PULMONARY DISEASE, UNSPECIFIED COPD TYPE (HCC): ICD-10-CM

## 2022-08-08 RX ORDER — FLUTICASONE FUROATE, UMECLIDINIUM BROMIDE AND VILANTEROL TRIFENATATE 100; 62.5; 25 UG/1; UG/1; UG/1
1 POWDER RESPIRATORY (INHALATION) DAILY
Qty: 2 EACH | Refills: 0 | COMMUNITY
Start: 2022-08-08 | End: 2022-09-02 | Stop reason: DRUGHIGH

## 2022-08-22 DIAGNOSIS — E11.9 TYPE 2 DIABETES MELLITUS WITHOUT COMPLICATION, WITHOUT LONG-TERM CURRENT USE OF INSULIN (HCC): ICD-10-CM

## 2022-08-23 ENCOUNTER — TELEPHONE (OUTPATIENT)
Dept: PRIMARY CARE CLINIC | Age: 67
End: 2022-08-23

## 2022-08-23 RX ORDER — GLIMEPIRIDE 1 MG/1
3 TABLET ORAL EVERY MORNING
Qty: 270 TABLET | Refills: 3 | Status: SHIPPED | OUTPATIENT
Start: 2022-08-23

## 2022-08-23 RX ORDER — ROSUVASTATIN CALCIUM 20 MG/1
TABLET, COATED ORAL
Qty: 90 TABLET | Refills: 3 | Status: SHIPPED | OUTPATIENT
Start: 2022-08-23

## 2022-08-23 NOTE — PROGRESS NOTES
[facility] OCCUPATIONAL THERAPY EVALUATION - ACUTE     NAME: Xenia Mcnair  : 1955 (74 y.o.)  MRN: [id number]  CODE STATUS: Full Code  [facility]    Date of Service: 3/19/2021    Patient Diagnosis(es): Left leg weakness [R29.898]   Chief Complaint   Patient presents with    Numbness     numbness to left leg and left arm starting 3 hours ago. Patient Active Problem List    Diagnosis Date Noted    Left leg weakness 2021        Past Medical History:   Diagnosis Date    Asthma     COPD [facility]chronic obstructive [facility] disease) [facility]     Depression     Diabetes mellitus ([facility]      Past Surgical History:   Procedure Laterality Date    CHOLECYSTECTOMY          Restrictions  Restrictions/Precautions: Fall Risk     Safety Devices: Safety Devices  Safety Devices in place: Yes        Subjective  Pre Treatment Pain Screening  Pain at present: 0  Scale Used: Numeric Score  Intervention List: Patient able to continue with treatment    Pain Reassessment:   Pain Assessment  Patient Currently in Pain: Denies       Prior Level of Function:  Social/Functional History  Lives With: Alone  Type of Home: House  Home Layout: One level  Home Access: Stairs to enter with rails  Entrance Stairs - Number of Steps: 4 in the back and 5 in the front - railing on both. Entrance Stairs - Rails: Both  Bathroom Shower/Tub: Tub/Shower unit  Bathroom Equipment: Grab bars in shower, Shower chair(Pt typically stands to shower but has shower chair if needed.)  Home Equipment: Cane, Standard walker, 4 wheeled walker(Pt has 2 walkers and a cane but reports she doesn't use them at home. Pt uses 4-wheeled walker when she leaves the house.)  Receives Help From: Family(Missael lives across the street.)  ADL Assistance: Independent  Homemaking Assistance: Needs assistance(Pt reports she \"doesn't like cleaning. \" and doesn't clean)  Homemaking Responsibilities: Yes  Ambulation Assistance: Independent  Transfer Assistance: Stand by assistance = Pt. does not perform task at an independent level but does not need physical assistance, requires verbal cues  Minimal, Moderate, Maximal Assistance = Pt. requires physical assistance (25%, 50%, 75% assist from helper) for task but is able to actively participate in task   Dependent = Pt. requires total assistance with task and is not able to actively participate with task completion     Functional Mobility:  Functional Mobility  Functional - Mobility Device: No device  Activity: Other  Assist Level: Supervision  Transfers  Sit to stand: Supervision  Stand to sit: Modified independent    Bed Mobility  Bed mobility  Rolling to Left: Modified independent  Supine to Sit: Modified independent  Sit to Supine: Independent  Scooting: Modified independent    Seated and Standing Balance:  Balance  Sitting Balance: Modified independent   Standing Balance: Supervision    Functional Endurance:  Activity Tolerance  Activity Tolerance: Patient Tolerated treatment well    D/C Recommendations:  OT D/C RECOMMENDATIONS  REQUIRES OT FOLLOW UP: Yes    Equipment Recommendations:  OT Equipment Recommendations  Other: Continue to assess    OT Education:   OT Education  OT Education: OT Role, Plan of Care  Barriers to Learning: None    OT Follow Up:  OT D/C RECOMMENDATIONS  REQUIRES OT FOLLOW UP: Yes       Assessment/Discharge Disposition:  Assessment: Pt is a 73 y/o female from home alone who presents to [facility] with the above deficits that affect her ability to complete ADL/IADL tasks. Pt would benefit from continued occupational therapy to maximize independence and safety at home.   Performance deficits / Impairments: Decreased functional mobility , Decreased safe awareness, Decreased endurance, Decreased sensation, Decreased balance, Decreased high-level IADLs  Prognosis: Good  Discharge Recommendations: Continue to assess pending progress  Decision Making: Low Complexity  History: Pt with low complexity medical history. Exam: 6 deficits  Assistance / Modification: Min A    Six Click Score   How much help for putting on and taking off regular lower body clothing?: A Little  How much help for Bathing?: A Little  How much help for Toileting?: A Little  How much help for putting on and taking off regular upper body clothing?: A Little  How much help for taking care of personal grooming?: None  How much help for eating meals?: None  AM-PAC Inpatient Daily Activity Raw Score: 20  AM-PAC Inpatient ADL T-Scale Score : 42.03  ADL Inpatient CMS 0-100% Score: 38.32    Plan:  Plan  Times per week: 1-3 x/ weekly  Plan weeks: Length of acute stay  Current Treatment Recommendations: Strengthening, Balance Training, Functional Mobility Training, Endurance Training, Safety Education & Training, Patient/Caregiver Education & Training, Equipment Evaluation, Education, & procurement, Positioning, Self-Care / ADL, Home Management Training    Goals:   Patient will:    - Improve functional endurance to tolerate/complete 30 mins of ADL's  - Be Ind in UB ADLs   - Be Mod I in LB ADLs  - Be Ind in ADL transfers without LOB  - Be Ind in toileting tasks  - Improve B UE Function (AROM, strength, motor control, tone normalization) to complete ADLs as projected. - Improve B UE strength and endurance to [facility] in order to participate in self-care activities as projected. - Access appropriate D/C site with as few architectural barriers as possible. Patient Goal: Patient goals :  To return home    Discussed and agreed upon: Yes   Therapy Time:   OT Individual Minutes  Time In: 1543  Time Out: 1556  Minutes: 13    Eval: 13 minutes   Electronically signed by Zander Franco OT on 3/19/2021 at 4:05 PM 98.3

## 2022-09-02 DIAGNOSIS — J44.9 CHRONIC OBSTRUCTIVE PULMONARY DISEASE, UNSPECIFIED COPD TYPE (HCC): ICD-10-CM

## 2022-09-02 NOTE — TELEPHONE ENCOUNTER
SAMPLES OF TRELEGY 200 GIVEN TO PATIENT BY [employer].    QTY: 2  LOT# [id number]  EXP 2/2024      PLEASE SIGN OFF ON SAMPLES

## 2022-09-04 RX ORDER — FLUTICASONE FUROATE, UMECLIDINIUM BROMIDE AND VILANTEROL TRIFENATATE 200; 62.5; 25 UG/1; UG/1; UG/1
1 POWDER RESPIRATORY (INHALATION) DAILY
Qty: 2 EACH | Refills: 0 | COMMUNITY
Start: 2022-09-04

## 2022-09-19 NOTE — TELEPHONE ENCOUNTER
Dr Gray Fink ordered a CT abd/pelvic for renal cysts wanted the patient to be aware. Let patient know nothing to be alarmed. Just wanted to do a follow up on it. I left message for patient to call back on machine. 28.8

## 2022-10-13 NOTE — TELEPHONE ENCOUNTER
SAMPLES OF TRELEGY 100 GIVEN TO PATIENT BY [employer].    QTY: 2  LOT# [id number]  EXP 3/2024      PLEASE SIGN OFF ON SAMPLES -- PLEASE DO NOT REFUSE SINCE THESE SAMPLES HAVE ALREADY BEEN GIVEN TO PATIENT

## 2022-10-17 DIAGNOSIS — H81.10 BENIGN PAROXYSMAL POSITIONAL VERTIGO, UNSPECIFIED LATERALITY: ICD-10-CM

## 2022-10-19 RX ORDER — MECLIZINE HYDROCHLORIDE 25 MG/1
25 TABLET ORAL 3 TIMES DAILY PRN
Qty: 30 TABLET | Refills: 0 | Status: SHIPPED | OUTPATIENT
Start: 2022-10-19

## 2022-11-06 DIAGNOSIS — F33.0 MILD EPISODE OF RECURRENT MAJOR DEPRESSIVE DISORDER (HCC): ICD-10-CM

## 2022-11-07 ENCOUNTER — OFFICE VISIT (OUTPATIENT)
Dept: PULMONOLOGY | Age: 67
End: 2022-11-07
Payer: COMMERCIAL

## 2022-11-07 VITALS
WEIGHT: 293 LBS | BODY MASS INDEX: 47.09 KG/M2 | TEMPERATURE: 96.9 F | HEART RATE: 96 BPM | SYSTOLIC BLOOD PRESSURE: 134 MMHG | RESPIRATION RATE: 16 BRPM | HEIGHT: 66 IN | OXYGEN SATURATION: 97 % | DIASTOLIC BLOOD PRESSURE: 72 MMHG

## 2022-11-07 DIAGNOSIS — I51.89 DIASTOLIC DYSFUNCTION: ICD-10-CM

## 2022-11-07 DIAGNOSIS — G47.33 OSA (OBSTRUCTIVE SLEEP APNEA): ICD-10-CM

## 2022-11-07 DIAGNOSIS — J45.40 MODERATE PERSISTENT ASTHMA WITHOUT COMPLICATION: ICD-10-CM

## 2022-11-07 DIAGNOSIS — Z87.891 HISTORY OF SMOKING: ICD-10-CM

## 2022-11-07 DIAGNOSIS — R93.89 ABNORMAL CT OF THE CHEST: ICD-10-CM

## 2022-11-07 DIAGNOSIS — E66.01 CLASS 3 SEVERE OBESITY DUE TO EXCESS CALORIES WITH SERIOUS COMORBIDITY AND BODY MASS INDEX (BMI) OF 50.0 TO 59.9 IN ADULT (HCC): ICD-10-CM

## 2022-11-07 DIAGNOSIS — J44.9 ASTHMA-COPD OVERLAP SYNDROME (HCC): ICD-10-CM

## 2022-11-07 DIAGNOSIS — G47.34 NOCTURNAL HYPOXIA: ICD-10-CM

## 2022-11-07 DIAGNOSIS — J45.40 MODERATE PERSISTENT ASTHMA WITHOUT COMPLICATION: Primary | ICD-10-CM

## 2022-11-07 LAB
BASOPHILS ABSOLUTE: 0.1 K/UL (ref 0–0.2)
BASOPHILS RELATIVE PERCENT: 0.8 %
EOSINOPHILS ABSOLUTE: 0.3 K/UL (ref 0–0.7)
EOSINOPHILS RELATIVE PERCENT: 3.4 %
HCT VFR BLD CALC: 41.4 % (ref 37–47)
HEMOGLOBIN: 14.1 G/DL (ref 12–16)
LYMPHOCYTES ABSOLUTE: 2.5 K/UL (ref 1–4.8)
LYMPHOCYTES RELATIVE PERCENT: 27.8 %
MCH RBC QN AUTO: 31.4 PG (ref 27–31.3)
MCHC RBC AUTO-ENTMCNC: 34 % (ref 33–37)
MCV RBC AUTO: 92.4 FL (ref 79.4–94.8)
MONOCYTES ABSOLUTE: 0.7 K/UL (ref 0.2–0.8)
MONOCYTES RELATIVE PERCENT: 7.5 %
NEUTROPHILS ABSOLUTE: 5.6 K/UL (ref 1.4–6.5)
NEUTROPHILS RELATIVE PERCENT: 60.5 %
PDW BLD-RTO: 13.4 % (ref 11.5–14.5)
PLATELET # BLD: 368 K/UL (ref 130–400)
RBC # BLD: 4.48 M/UL (ref 4.2–5.4)
WBC # BLD: 9.2 K/UL (ref 4.8–10.8)

## 2022-11-07 PROCEDURE — 1123F ACP DISCUSS/DSCN MKR DOCD: CPT | Performed by: INTERNAL MEDICINE

## 2022-11-07 PROCEDURE — 99214 OFFICE O/P EST MOD 30 MIN: CPT | Performed by: INTERNAL MEDICINE

## 2022-11-07 RX ORDER — ALBUTEROL SULFATE 2.5 MG/3ML
2.5 SOLUTION RESPIRATORY (INHALATION) EVERY 6 HOURS PRN
Qty: 120 EACH | Refills: 3 | Status: SHIPPED | OUTPATIENT
Start: 2022-11-07

## 2022-11-07 RX ORDER — FLUTICASONE FUROATE 100 UG/1
1 POWDER RESPIRATORY (INHALATION) DAILY
Qty: 1 EACH | Refills: 3 | Status: SHIPPED | OUTPATIENT
Start: 2022-11-07

## 2022-11-07 RX ORDER — ALBUTEROL SULFATE 90 UG/1
2 AEROSOL, METERED RESPIRATORY (INHALATION) EVERY 6 HOURS PRN
Qty: 1 EACH | Refills: 3 | Status: SHIPPED | OUTPATIENT
Start: 2022-11-07

## 2022-11-07 NOTE — PROGRESS NOTES
Subjective:     German Pettit is a 69 y.o. female who complains today of:     Chief Complaint   Patient presents with    Follow-up     4 Month F/U for COPD and FROYLAN on CPAP       HPI  Patient presents for asthma-COPD overlap syndrome      Doing good, symptoms not well controlled, gets dyspnea on significant exertion mainly when she works in the yard, no chest pain, no nasal congestion or postnasal drip, no heartburn, no lower extremity edema, she is compliant with CPAP, weight is stable, she is currently on Trelegy she uses 100-200 depending on what samples we have. She has been using as needed Dulera, maybe once a week, she has not been using as needed albuterol. She has an old [place] prescription which she has been using.           Allergies:  Budesonide-formoterol fumarate  Past Medical History:   Diagnosis Date    Asthma     COPD (chronic obstructive pulmonary disease) ([facility])     Depression     Diabetes mellitus ([facility] [address]     Sleep apnea      Past Surgical History:   Procedure Laterality Date    CHOLECYSTECTOMY      COLONOSCOPY N/A 2021    COLORECTAL CANCER SCREENING with polypectomies performed by Bud Guillen MD at [facility]     Family History   Problem Relation Age of Onset    Breast Cancer Mother     Colon Cancer Neg Hx      Social History     Socioeconomic History    Marital status:      Spouse name: Not on file    Number of children: Not on file    Years of education: Not on file    Highest education level: Not on file   Occupational History    Not on file   Tobacco Use    Smoking status: Former     Packs/day: 1.00     Years: 40.00     Pack years: 40.00     Types: Cigarettes     Start date:      Quit date:      Years since quittin.8    Smokeless tobacco: Former     Quit date:    Vaping Use    Vaping Use: Never used   Substance and Sexual Activity    Alcohol use: Not Currently     Comment: occass    Drug use: Never    Sexual activity: Not on file   Other Topics Concern Not on file   Social History Narrative    Not on file     Social Determinants of Health     Financial Resource Strain: Low Risk     Difficulty of Paying Living Expenses: Not hard at all   Food Insecurity: No Food Insecurity    Worried About [address] in the Last Year: Never true    [facility] in the Last Year: Never true   Transportation Needs: No Transportation Needs    Lack of Transportation (Medical): No    Lack of Transportation (Non-Medical): No   Physical Activity: Inactive    Days of Exercise per Week: 0 days    Minutes of Exercise per Session: 0 min   Stress: Not on file   Social Connections: Not on file   Intimate Partner Violence: Not on file   Housing Stability: Not on file         Review of Systems      ROS: 10 organs review of system is done including general, psychological, ENT, hematological, endocrine, respiratory, cardiovascular, gastrointestinal,musculoskeletal, neurological,  allergy and Immunology is done and is otherwise negative.     Current Outpatient Medications   Medication Sig Dispense Refill    fluticasone (ARNUITY ELLIPTA) 100 MCG/ACT AEPB Inhale 1 puff into the lungs daily 1 each 3    albuterol sulfate HFA (PROVENTIL;VENTOLIN;PROAIR) 108 (90 Base) MCG/ACT inhaler Inhale 2 puffs into the lungs every 6 hours as needed for Wheezing 1 each 3    albuterol (PROVENTIL) (2.5 MG/3ML) 0.083% nebulizer solution Take 3 mLs by nebulization every 6 hours as needed for Wheezing 120 each 3    meclizine (ANTIVERT) 25 MG tablet Take 1 tablet by mouth 3 times daily as needed for Dizziness 30 tablet 0    Fluticasone-Umeclidin-Vilant (TRELEGY ELLIPTA) 200-62.5-25 MCG/INH AEPB Inhale 1 puff into the lungs daily 2 each 0    glimepiride (AMARYL) 1 MG tablet Take 3 tablets by mouth every morning 270 tablet 3    rosuvastatin (CRESTOR) 20 MG tablet TAKE 1 TABLET BY MOUTH NIGHTLY 90 tablet 3    fluticasone (FLONASE) 50 MCG/ACT nasal spray 2 sprays by Nasal route daily 1 each 1    Lancets (ONETOUCH DELICA PLUS [id number]) MISC use as directed to Test fasting sugar and after largest meal of the day 200 each 10    torsemide (DEMADEX) 20 MG tablet Take 1 tablet by mouth daily 90 tablet 3    montelukast (SINGULAIR) 10 MG tablet Take 1 tablet by mouth nightly 90 tablet 3    metFORMIN (GLUCOPHAGE) 500 MG tablet Take 2 tablets by mouth Daily with supper 180 tablet 3    cholestyramine (QUESTRAN) 4 g packet Take 1 packet by mouth 2 times daily 30 packet 2    blood glucose monitor strips DX: diabetes mellitus. Use fasting and after largest meal of the day - Ok to substitute per insurance 100 strip 10    DULoxetine (CYMBALTA) 30 MG extended release capsule Take 3 capsules by mouth daily 180 capsule 3    Blood Glucose Monitoring Suppl (ONETOUCH VERIO FLEX SYSTEM) w/Device KIT USE AS DIRECTED      methocarbamol (ROBAXIN) 500 MG tablet Take 1 tablet by mouth twice a day      vitamin B-12 (CYANOCOBALAMIN) 500 MCG tablet Take 500 mcg by mouth daily      Omega-3 Fatty Acids (FISH OIL) 1000 MG CAPS Take 1 capsule by mouth 3 times daily 270 capsule 3    acetaminophen (TYLENOL) 500 MG tablet       blood glucose monitor kit and supplies Dispense sufficient amount for indicated testing frequency plus additional to accommodate PRN testing needs. Dispense all needed supplies to include: monitor, strips, lancing device, lancets, control solutions, alcohol swabs. 1 kit 0    gabapentin (NEURONTIN) 100 MG capsule Take 1 capsule by mouth 3 times daily for 90 days. 270 capsule 3     No current facility-administered medications for this visit. Objective:     Vitals:    11/07/22 1356   BP: 134/72   Site: Left Lower Arm   Position: Sitting   Cuff Size: Medium Adult   Pulse: 96   Resp: 16   Temp: 96.9 °F (36.1 °C)   TempSrc: Infrared   SpO2: 97%   Weight: (!) 313 lb 6.4 oz (142.2 kg)   Height: 5' 6\" (1.676 m)         Physical Exam  Constitutional:       General: She is not in acute distress. Appearance: She is well-developed.  She is not diaphoretic. HENT:      Head: Normocephalic and atraumatic. Eyes:      Conjunctiva/sclera: Conjunctivae normal.      Pupils: Pupils are equal, round, and reactive to light. Cardiovascular:      Rate and Rhythm: Normal rate and regular rhythm. Heart sounds: No murmur heard. No friction rub. No gallop. Pulmonary:      Effort: Pulmonary effort is normal. No respiratory distress. Breath sounds: Normal breath sounds. No wheezing or rales. Chest:      Chest wall: No tenderness. Abdominal:      General: There is no distension. Palpations: Abdomen is soft. Tenderness: There is no abdominal tenderness. There is no rebound. Musculoskeletal:         General: No tenderness. Cervical back: Normal range of motion and neck supple. Right lower leg: No edema. Left lower leg: No edema. Lymphadenopathy:      Cervical: No cervical adenopathy. Skin:     General: Skin is warm and dry. Findings: No erythema. Neurological:      Mental Status: She is alert and oriented to person, place, and time. Psychiatric:         Judgment: Judgment normal.       Imaging studies reviewed and interpreted by me CT lung cancer screening June 2022, no mass or nodule, bandlike atelectasis left upper lobe  Lab results reviewed in chart  PFT May 2021, FEV1 77% FEV1/FVC 0.62 with positive response to bronchodilator   ECHO: April 4057, EF 19%, diastolic dysfunction  Sleep study shows AHI 14.5  Assessment and Plan       Diagnosis Orders   1. Moderate persistent asthma without complication  CBC with Auto Differential    Allergen, Respiratory, Region 5 Panel    fluticasone (ARNUITY ELLIPTA) 100 MCG/ACT AEPB    albuterol sulfate HFA (PROVENTIL;VENTOLIN;PROAIR) 108 (90 Base) MCG/ACT inhaler    albuterol (PROVENTIL) (2.5 MG/3ML) 0.083% nebulizer solution    Fluticasone-Umeclidin-Vilant (TRELEGY ELLIPTA) 200-62.5-25 MCG/INH AEPB      2. Nocturnal hypoxia  DME Order for Home Oxygen as OP      3. Abnormal CT of the chest  CT CHEST WO CONTRAST      4. FROYLAN (obstructive sleep apnea)        5. Class 3 severe obesity due to excess calories with serious comorbidity and body mass index (BMI) of 50.0 to 59.9 in adult ([facility] [address]        6. Diastolic dysfunction        7. History of smoking        8. Asthma-COPD overlap syndrome (HCC)          Asthma, symptoms not well controlled, continue Trelegy, add Arnuity, stop Dulera, continue as needed albuterol, will check Asthma allergy profile and CBC with differential.  Nocturnal hypoxia, with underlying COPD/asthma and FROYLAN,   will start O2 bleed while asleep at 2 L/min. Weight loss is recommended   Diastolic dysfunction , continue cardioprotective medications   abnormal CT chest, atelectatic changes, will repeat CT chest in December for monitoring      Orders Placed This Encounter   Procedures    CBC with Auto Differential     Standing Status:   Future     Standing Expiration Date:   11/7/2023    Allergen, Respiratory, Region 5 Panel     Standing Status:   Future     Standing Expiration Date:   11/7/2023    DME Order for Home Oxygen as OP     You must complete the order parameters below and add the medical necessity documentation for this DME in a separate note.     Stationary Oxygen Concentrator at 2 lpm via Nasal Cannula    Stationary Prescribed at:  Non Continuous while sleeping    No Portable O2 System Ordered    Non Applicable    Diagnosis: Nocturnal hypoxia   Length of need: Lifetime     Orders Placed This Encounter   Medications    fluticasone (ARNUITY ELLIPTA) 100 MCG/ACT AEPB     Sig: Inhale 1 puff into the lungs daily     Dispense:  1 each     Refill:  3    albuterol sulfate HFA (PROVENTIL;VENTOLIN;PROAIR) 108 (90 Base) MCG/ACT inhaler     Sig: Inhale 2 puffs into the lungs every 6 hours as needed for Wheezing     Dispense:  1 each     Refill:  3    albuterol (PROVENTIL) (2.5 MG/3ML) 0.083% nebulizer solution     Sig: Take 3 mLs by nebulization every 6 hours as needed for Wheezing     Dispense:  120 each     Refill:  3            Discussed with patient the importance of exercise and weight control and  overall health and well-being. Reviewed with the patient: current clinical status, medications, activities and diet. Side effects, adverse effects of the medication prescribed today, as well as treatment plan and result expectations have been discussed with the patient who expresses understanding and desires to proceed. Return in about 3 months (around 2/7/2023).       Lianna Cardenas MD no

## 2022-11-08 ENCOUNTER — TELEPHONE (OUTPATIENT)
Dept: PULMONOLOGY | Age: 67
End: 2022-11-08

## 2022-11-08 RX ORDER — DULOXETIN HYDROCHLORIDE 30 MG/1
90 CAPSULE, DELAYED RELEASE ORAL DAILY
Qty: 180 CAPSULE | Refills: 3 | Status: SHIPPED | OUTPATIENT
Start: 2022-11-08

## 2022-11-08 NOTE — TELEPHONE ENCOUNTER
PT CALLED IN TO THE OFFICE BECAUSE HER ARNUITY ELLIPTA IS NOT COVERED BY HER INSURANCE SHE CALLED [employer] TO SEE WHAT ELSE THEY PREFERRED AND GOT NO WHERE.          SHE STATED DR MALONEY SAID SHE COULD DO AN INJECTION INSTEAD         PLEASE ADVISE

## 2022-11-08 NOTE — TELEPHONE ENCOUNTER
Patient has to talk to insurance first so we know what is covered. I haven't received anything for a prior auth from her pharmacy. Tell her to try her insurance again.

## 2022-11-09 LAB
2000687N OAK TREE IGE: <0.1 KU/L (ref 0–0.34)
ALLERGEN BERMUDA GRASS IGE: <0.1 KU/L (ref 0–0.34)
ALLERGEN BIRCH IGE: <0.1 KU/L (ref 0–0.34)
ALLERGEN DOG DANDER IGE: <0.1 KU/L (ref 0–0.34)
ALLERGEN GERMAN COCKROACH IGE: <0.1 KU/L (ref 0–0.34)
ALLERGEN HORMODENDRUM IGE: <0.1 KUL/L (ref 0–0.34)
ALLERGEN MOUSE EPITHELIA IGE: <0.1 KU/L (ref 0–0.34)
ALLERGEN PECAN TREE IGE: <0.1 KU/L (ref 0–0.34)
ALLERGEN PIGWEED ROUGH IGE: <0.1 KU/L (ref 0–0.34)
ALLERGEN SHEEP SORREL (W18) IGE: <0.1 KU/L (ref 0–0.34)
ALLERGEN TREE SYCAMORE: <0.1 KU/L (ref 0–0.34)
ALLERGEN WALNUT TREE IGE: <0.1 KU/L (ref 0–0.34)
ALLERGEN WHITE MULBERRY TREE, IGE: <0.1 KU/L (ref 0–0.34)
ALLERGEN, TREE, WHITE ASH IGE: <0.1 KU/L (ref 0–0.34)
ALTERNARIA ALTERNATA: <0.1 KU/L (ref 0–0.34)
ASPERGILLUS FUMIGATUS: <0.1 KU/L (ref 0–0.34)
CAT DANDER ANTIBODY: <0.1 KU/L (ref 0–0.34)
COTTONWOOD TREE: <0.1 KU/L (ref 0–0.34)
D. FARINAE: <0.1 KU/L (ref 0–0.34)
D. PTERONYSSINUS: <0.1 KU/L (ref 0–0.34)
ELM TREE: <0.1 KU/L (ref 0–0.34)
IGE: <1 IU/ML
MAPLE/BOXELDER TREE: <0.1 KU/L (ref 0–0.34)
MOUNTAIN CEDAR TREE: <0.1 KU/L (ref 0–0.34)
MUCOR RACEMOSUS: <0.1 KU/L (ref 0–0.34)
P. NOTATUM: <0.1 KU/L (ref 0–0.34)
RUSSIAN THISTLE: <0.1 KU/L (ref 0–0.34)
SHORT RAGWD(A ARTEMIS.) IGE: <0.1 KU/L (ref 0–0.34)
TIMOTHY GRASS: <0.1 KU/L (ref 0–0.34)

## 2022-11-23 ENCOUNTER — OFFICE VISIT (OUTPATIENT)
Dept: PRIMARY CARE CLINIC | Age: 67
End: 2022-11-23
Payer: MEDICARE

## 2022-11-23 VITALS
OXYGEN SATURATION: 97 % | SYSTOLIC BLOOD PRESSURE: 126 MMHG | BODY MASS INDEX: 47.09 KG/M2 | HEART RATE: 81 BPM | WEIGHT: 293 LBS | RESPIRATION RATE: 16 BRPM | HEIGHT: 66 IN | DIASTOLIC BLOOD PRESSURE: 70 MMHG | TEMPERATURE: 97.2 F

## 2022-11-23 DIAGNOSIS — M79.602 PARESTHESIA AND PAIN OF BOTH UPPER EXTREMITIES: Primary | ICD-10-CM

## 2022-11-23 DIAGNOSIS — R41.3 MEMORY LOSS: ICD-10-CM

## 2022-11-23 DIAGNOSIS — R20.2 PARESTHESIA AND PAIN OF BOTH UPPER EXTREMITIES: Primary | ICD-10-CM

## 2022-11-23 DIAGNOSIS — E11.9 TYPE 2 DIABETES MELLITUS WITHOUT COMPLICATION, WITHOUT LONG-TERM CURRENT USE OF INSULIN (HCC): ICD-10-CM

## 2022-11-23 DIAGNOSIS — I50.9 CONGESTIVE HEART FAILURE, UNSPECIFIED HF CHRONICITY, UNSPECIFIED HEART FAILURE TYPE (HCC): ICD-10-CM

## 2022-11-23 DIAGNOSIS — M79.601 PARESTHESIA AND PAIN OF BOTH UPPER EXTREMITIES: Primary | ICD-10-CM

## 2022-11-23 DIAGNOSIS — Z00.00 HEALTH CARE MAINTENANCE: ICD-10-CM

## 2022-11-23 LAB
HBA1C MFR BLD: 7.2 % (ref 4.8–5.9)
TSH REFLEX: 1.42 UIU/ML (ref 0.44–3.86)

## 2022-11-23 PROCEDURE — G0008 ADMIN INFLUENZA VIRUS VAC: HCPCS | Performed by: INTERNAL MEDICINE

## 2022-11-23 PROCEDURE — 1123F ACP DISCUSS/DSCN MKR DOCD: CPT | Performed by: INTERNAL MEDICINE

## 2022-11-23 PROCEDURE — 90694 VACC AIIV4 NO PRSRV 0.5ML IM: CPT | Performed by: INTERNAL MEDICINE

## 2022-11-23 PROCEDURE — 99214 OFFICE O/P EST MOD 30 MIN: CPT | Performed by: INTERNAL MEDICINE

## 2022-11-23 PROCEDURE — 3051F HG A1C>EQUAL 7.0%<8.0%: CPT | Performed by: INTERNAL MEDICINE

## 2022-11-23 RX ORDER — SPIRONOLACTONE 25 MG/1
TABLET ORAL
COMMUNITY
Start: 2022-11-07

## 2022-11-23 RX ORDER — NITROGLYCERIN 0.4 MG/1
0.4 TABLET SUBLINGUAL EVERY 5 MIN PRN
Qty: 25 TABLET | Refills: 3 | Status: SHIPPED | OUTPATIENT
Start: 2022-11-23

## 2022-11-23 NOTE — PROGRESS NOTES
Subjective:      Patient ID: Jeanette Flores is a 69 y.o. female    Tingling of fingers x 1 month   HPI  Pt presents with 1 month of tingling and twitching of fingers of both hands. No such in feet. NO weakness. Assoc memory loss. Lives alone and functions well at home. Not a vegetarian. Occasional visual hallucinations present. Narcolepsy ruled out by neuro in 2022-?? Lewy body dementia. Brain MRI suggested. T2DM. Home BG low 200s   Hemoglobin A1C (%)   Date Value   2022 7.2 (H)   2022 7.3 (H)   10/06/2021 7.5 (H)   2021 7.6 (H)   2021 7.5 (H)   CHF- not in exacerbation.     Past Medical History:   Diagnosis Date    Asthma     COPD (chronic obstructive pulmonary disease) ([facility] [address]     Depression     [facility]     Sleep apnea      Past Surgical History:   Procedure Laterality Date    CHOLECYSTECTOMY      COLONOSCOPY N/A 2021    COLORECTAL CANCER SCREENING with polypectomies performed by Beckey Gaucher, MD at [address] [place] [address] History    Marital status:      Spouse name: Not on file    Number of children: Not on file    Years of education: Not on file    Highest education level: Not on file   Occupational History    Not on file   Tobacco Use    Smoking status: Former     Packs/day: 1.00     Years: 40.00     Pack years: 40.00     Types: Cigarettes     Start date:      Quit date:      Years since quittin.9    Smokeless tobacco: Former     Quit date:    Vaping Use    Vaping Use: Never used   Substance and Sexual Activity    Alcohol use: Not Currently     Comment: occass    Drug use: Never    Sexual activity: Not on file   Other Topics Concern    Not on file   Social History Narrative    Not on file     Social Determinants of Health     Financial Resource Strain: Low Risk     Difficulty of Paying Living Expenses: Not hard at all   Food Insecurity: No Food Insecurity    Worried About [address] in the Last Year: Never true    [facility] in the Last Year: Never true   Transportation Needs: No Transportation Needs    Lack of Transportation (Medical): No    Lack of Transportation (Non-Medical):  No   Physical Activity: Inactive    Days of Exercise per Week: 0 days    Minutes of Exercise per Session: 0 min   Stress: Not on file   Social Connections: Not on file   Intimate Partner Violence: Not on file   Housing Stability: Not on file     Family History   Problem Relation Age of Onset    Breast Cancer Mother     Colon Cancer Neg Hx      Allergies:  Budesonide-formoterol fumarate  Patient Active Problem List   Diagnosis    Left leg weakness    Inflammation of left sacroiliac joint (HCC)    Piriformis syndrome of left side    Acute back pain with sciatica, left    Intervertebral disc stenosis of neural canal of lumbar region    Postlaminectomy syndrome, lumbar region    Adenomatous polyp of colon    Adenomatous polyp of ascending colon    Adenomatous polyp of transverse colon    Adenomatous polyp of sigmoid colon    Nicotine dependence    FROYLAN treated with BiPAP    Asthma with COPD with exacerbation ([facility] [address]    Controlled type 2 diabetes mellitus without complication, without long-term current use of insulin ([facility] [address]    Morbid obesity due to excess calories (HCC)    Congestive heart failure, unspecified HF chronicity, unspecified heart failure type (HCC)    Mild episode of recurrent major depressive disorder (HCC)    Pseudopolyposis of colon without complication, unspecified part of colon ([facility] [address]     Current Outpatient Medications on File Prior to Visit   Medication Sig Dispense Refill    spironolactone (ALDACTONE) 25 MG tablet TAKE 1 TABLET BY MOUTH DAILY      DULoxetine (CYMBALTA) 30 MG extended release capsule Take 3 capsules by mouth daily 180 capsule 3    fluticasone (ARNUITY ELLIPTA) 100 MCG/ACT AEPB Inhale 1 puff into the lungs daily 1 each 3    albuterol sulfate HFA (PROVENTIL;VENTOLIN;PROAIR) 108 (90 Base) MCG/ACT inhaler Inhale 2 puffs into the lungs every 6 hours as needed for Wheezing 1 each 3    albuterol (PROVENTIL) (2.5 MG/3ML) 0.083% nebulizer solution Take 3 mLs by nebulization every 6 hours as needed for Wheezing 120 each 3    Fluticasone-Umeclidin-Vilant (TRELEGY ELLIPTA) 200-62.5-25 MCG/INH AEPB Inhale 1 puff into the lungs daily 2 each 0    meclizine (ANTIVERT) 25 MG tablet Take 1 tablet by mouth 3 times daily as needed for Dizziness 30 tablet 0    Fluticasone-Umeclidin-Vilant (TRELEGY ELLIPTA) 200-62.5-25 MCG/INH AEPB Inhale 1 puff into the lungs daily 2 each 0    glimepiride (AMARYL) 1 MG tablet Take 3 tablets by mouth every morning 270 tablet 3    rosuvastatin (CRESTOR) 20 MG tablet TAKE 1 TABLET BY MOUTH NIGHTLY 90 tablet 3    fluticasone (FLONASE) 50 MCG/ACT nasal spray 2 sprays by Nasal route daily 1 each 1    Lancets (ONETOUCH DELICA PLUS [id number]) MISC use as directed to Test fasting sugar and after largest meal of the day 200 each 10    torsemide (DEMADEX) 20 MG tablet Take 1 tablet by mouth daily 90 tablet 3    montelukast (SINGULAIR) 10 MG tablet Take 1 tablet by mouth nightly 90 tablet 3    metFORMIN (GLUCOPHAGE) 500 MG tablet Take 2 tablets by mouth Daily with supper 180 tablet 3    cholestyramine (QUESTRAN) 4 g packet Take 1 packet by mouth 2 times daily 30 packet 2    blood glucose monitor strips DX: diabetes mellitus.  Use fasting and after largest meal of the day - Ok to substitute per insurance 100 strip 10    Blood Glucose Monitoring Suppl ([address]) w/Device KIT USE AS DIRECTED      methocarbamol (ROBAXIN) 500 MG tablet Take 1 tablet by mouth twice a day      vitamin B-12 (CYANOCOBALAMIN) 500 MCG tablet Take 500 mcg by mouth daily      Omega-3 Fatty Acids (FISH OIL) 1000 MG CAPS Take 1 capsule by mouth 3 times daily 270 capsule 3    acetaminophen (TYLENOL) 500 MG tablet       blood glucose monitor kit and supplies Dispense sufficient amount for indicated testing frequency plus additional to accommodate PRN testing needs. Dispense all needed supplies to include: monitor, strips, lancing device, lancets, control solutions, alcohol swabs. 1 kit 0    gabapentin (NEURONTIN) 100 MG capsule Take 1 capsule by mouth 3 times daily for 90 days. 270 capsule 3     No current facility-administered medications on file prior to visit. Review of Systems   Constitutional:  Negative for chills, diaphoresis, fatigue and fever. HENT:  Negative for congestion, ear discharge, ear pain, rhinorrhea, sinus pressure, sinus pain, sneezing and sore throat. Respiratory:  Negative for cough, shortness of breath and wheezing. Cardiovascular:  Negative for chest pain. Gastrointestinal:  Negative for abdominal pain, diarrhea, nausea and vomiting. Endocrine: Negative for cold intolerance and heat intolerance. Genitourinary:  Negative for dysuria and frequency. Neurological:  Positive for numbness. Negative for dizziness and light-headedness. Psychiatric/Behavioral:  Positive for hallucinations. Objective:   /70   Pulse 81   Temp 97.2 °F (36.2 °C)   Resp 16   Ht 5' 6\" (1.676 m)   Wt (!) 313 lb 12.8 oz (142.3 kg)   LMP  (LMP Unknown)   SpO2 97%   BMI 50.65 kg/m²     Physical Exam  Constitutional:       General: She is not in acute distress. Appearance: She is not diaphoretic. Cardiovascular:      Rate and Rhythm: Normal rate and regular rhythm. Pulses: Normal pulses. Heart sounds: Normal heart sounds, S1 normal and S2 normal.   Pulmonary:      Effort: Pulmonary effort is normal. No respiratory distress. Breath sounds: Normal breath sounds. No wheezing or rales. Chest:      Chest wall: No tenderness. Abdominal:      General: Bowel sounds are normal.      Tenderness: There is no abdominal tenderness. Neurological:      General: No focal deficit present. Mental Status: She is alert. Cranial Nerves: No cranial nerve deficit. Motor: No weakness. Gait: Gait normal.   Psychiatric:         Mood and Affect: Mood normal.         Thought Content: Thought content normal.     Assessment:       Diagnosis Orders   1. Paresthesia and pain of both upper extremities  MRI BRAIN WO CONTRAST    TAMMI David MD, Neurology, [facility]      2. Memory loss  Vitamin B12    TSH with Reflex    Rpr    MRI Eliezer Reynolds MD, Neurology, [facility]    Hemoglobin A1C    Methylmalonic Acid, Serum      3. Type 2 diabetes mellitus without complication, without long-term current use of insulin ([facility]) Controlled       4. Health care maintenance  Influenza, FLUAD, (age 72 y+), IM, Preservative Free, 0.5 mL      5.  Congestive heart failure, unspecified HF chronicity, unspecified heart failure type ([facility])  nitroGLYCERIN (NITROSTAT) 0.4 MG SL tablet    not in exacerbation         Plan:      Orders Placed This Encounter   Procedures    MRI BRAIN WO CONTRAST     Standing Status:   Future     Standing Expiration Date:   11/23/2023    Influenza, FLUAD, (age 72 y+), IM, Preservative Free, 0.5 mL    Vitamin B12     Standing Status:   Future     Number of Occurrences:   1     Standing Expiration Date:   11/23/2023    TSH with Reflex     Standing Status:   Future     Number of Occurrences:   1     Standing Expiration Date:   11/23/2023    Rpr     Standing Status:   Future     Number of Occurrences:   1     Standing Expiration Date:   11/23/2023    Hemoglobin A1C     Standing Status:   Future     Number of Occurrences:   1     Standing Expiration Date:   11/23/2023    Methylmalonic Acid, Serum     Standing Status:   Future     Number of Occurrences:   1     Standing Expiration Date:   11/23/2023    TAMMI David MD, Neurology, [facility]     Referral Priority:   Routine     Referral Type:   Eval and Treat     Referral Reason:   Specialty Services Required     Referred to Provider:   Ammy Menchaca MD     Requested Specialty:   Neurology     Number of Visits Requested:   1     Orders Placed This Encounter   Medications    nitroGLYCERIN (NITROSTAT) 0.4 MG SL tablet     Sig: Place 1 tablet under the tongue every 5 minutes as needed for Chest pain up to max of 3 total doses. If no relief after 1 dose, call 911. Dispense:  25 tablet     Refill:  3       Return in about 1 month (around 12/23/2022) for follow up, with PCP.

## 2022-11-24 LAB — VITAMIN B-12: 1045 PG/ML (ref 232–1245)

## 2022-11-25 LAB — RPR: NORMAL

## 2022-11-25 ASSESSMENT — ENCOUNTER SYMPTOMS
NAUSEA: 0
RHINORRHEA: 0
WHEEZING: 0
SINUS PRESSURE: 0
SORE THROAT: 0
SHORTNESS OF BREATH: 0
ABDOMINAL PAIN: 0
DIARRHEA: 0
COUGH: 0
SINUS PAIN: 0
VOMITING: 0

## 2022-11-27 LAB — METHYLMALONIC ACID: 0.2 UMOL/L (ref 0–0.4)

## 2022-12-01 ENCOUNTER — HOSPITAL ENCOUNTER (OUTPATIENT)
Dept: MRI IMAGING | Age: 67
Discharge: HOME | End: 2022-12-03
Payer: MEDICARE

## 2022-12-01 DIAGNOSIS — M79.602 PARESTHESIA AND PAIN OF BOTH UPPER EXTREMITIES: ICD-10-CM

## 2022-12-01 DIAGNOSIS — M79.601 PARESTHESIA AND PAIN OF BOTH UPPER EXTREMITIES: ICD-10-CM

## 2022-12-01 DIAGNOSIS — R41.3 MEMORY LOSS: ICD-10-CM

## 2022-12-01 DIAGNOSIS — R20.2 PARESTHESIA AND PAIN OF BOTH UPPER EXTREMITIES: ICD-10-CM

## 2022-12-01 PROCEDURE — 70551 MRI BRAIN STEM W/O DYE: CPT

## 2022-12-03 DIAGNOSIS — H81.10 BENIGN PAROXYSMAL POSITIONAL VERTIGO, UNSPECIFIED LATERALITY: ICD-10-CM

## 2022-12-08 ENCOUNTER — HOSPITAL ENCOUNTER (OUTPATIENT)
Dept: CT IMAGING | Age: 67
Discharge: HOME | End: 2022-12-10
Payer: MEDICARE

## 2022-12-08 DIAGNOSIS — R93.89 ABNORMAL CT OF THE CHEST: ICD-10-CM

## 2022-12-08 PROCEDURE — 71250 CT THORAX DX C-: CPT

## 2022-12-08 RX ORDER — MECLIZINE HYDROCHLORIDE 25 MG/1
25 TABLET ORAL 3 TIMES DAILY PRN
Qty: 30 TABLET | Refills: 1 | Status: SHIPPED | OUTPATIENT
Start: 2022-12-08

## 2022-12-16 ENCOUNTER — OFFICE VISIT (OUTPATIENT)
Dept: PRIMARY CARE CLINIC | Age: 67
End: 2022-12-16

## 2022-12-16 VITALS
HEART RATE: 95 BPM | OXYGEN SATURATION: 96 % | BODY MASS INDEX: 47.09 KG/M2 | DIASTOLIC BLOOD PRESSURE: 70 MMHG | SYSTOLIC BLOOD PRESSURE: 124 MMHG | RESPIRATION RATE: 18 BRPM | WEIGHT: 293 LBS | HEIGHT: 66 IN | TEMPERATURE: 97.9 F

## 2022-12-16 DIAGNOSIS — R41.3 MEMORY LOSS: ICD-10-CM

## 2022-12-16 DIAGNOSIS — E11.9 TYPE 2 DIABETES MELLITUS WITHOUT COMPLICATION, WITHOUT LONG-TERM CURRENT USE OF INSULIN (HCC): ICD-10-CM

## 2022-12-16 DIAGNOSIS — G56.03 CARPAL TUNNEL SYNDROME, BILATERAL: Primary | ICD-10-CM

## 2022-12-16 NOTE — PROGRESS NOTES
Subjective:      Patient ID: Ammy Harvey is a 69 y.o. female    Follow up   HPI  Pt presents for follow up regarding neuropathy and memory loss. TSH, B12 normal. Brain MRI revealed mild atrophy. Yet to make neuro appt. Neuropathy is in both hands, not arms. Hx chronic computer work for many years.    Hemoglobin A1C (%)   Date Value   2022 7.2 (H)   2022 7.3 (H)   10/06/2021 7.5 (H)   2021 7.6 (H)   2021 7.5 (H)     Past Medical History:   Diagnosis Date    Asthma     COPD (chronic obstructive pulmonary disease) (HCC)     Depression     Diabetes mellitus ([facility]     Sleep apnea      Past Surgical History:   Procedure Laterality Date    CHOLECYSTECTOMY      COLONOSCOPY N/A 2021    COLORECTAL CANCER SCREENING with polypectomies performed by Dionne Bernheim, MD at [address] [place] [address] History    Marital status:      Spouse name: Not on file    Number of children: Not on file    Years of education: Not on file    Highest education level: Not on file   Occupational History    Not on file   Tobacco Use    Smoking status: Former     Packs/day: 1.00     Years: 40.00     Pack years: 40.00     Types: Cigarettes     Start date:      Quit date:      Years since quittin.9    Smokeless tobacco: Former     Quit date:    Vaping Use    Vaping Use: Never used   Substance and Sexual Activity    Alcohol use: Not Currently     Comment: occass    Drug use: Never    Sexual activity: Not on file   Other Topics Concern    Not on file   Social History Narrative    Not on file     Social Determinants of Health     Financial Resource Strain: Low Risk     Difficulty of Paying Living Expenses: Not hard at all   Food Insecurity: No Food Insecurity    Worried About Running Out of Food in the Last Year: Never true    [address] in the Last Year: Never true   Transportation Needs: No Transportation Needs    Lack of Transportation (Medical): No    Lack of Transportation (Non-Medical): No   Physical Activity: Inactive    Days of Exercise per Week: 0 days    Minutes of Exercise per Session: 0 min   Stress: Not on file   Social Connections: Not on file   Intimate Partner Violence: Not on file   Housing Stability: Not on file     Family History   Problem Relation Age of Onset    Breast Cancer Mother     Colon Cancer Neg Hx      Allergies:  Budesonide-formoterol fumarate  Patient Active Problem List   Diagnosis    Left leg weakness    Inflammation of left sacroiliac joint (HCC)    Piriformis syndrome of left side    Acute back pain with sciatica, left    Intervertebral disc stenosis of neural canal of lumbar region    Postlaminectomy syndrome, lumbar region    Adenomatous polyp of colon    Adenomatous polyp of ascending colon    Adenomatous polyp of transverse colon    Adenomatous polyp of sigmoid colon    Nicotine dependence    FROYLAN treated with BiPAP    Asthma with COPD with exacerbation ([facility] [address]    Controlled type 2 diabetes mellitus without complication, without long-term current use of insulin ([facility] [address] [facility]    Morbid obesity due to excess calories ([facility])    Congestive heart failure, unspecified HF chronicity, unspecified heart failure type ([facility])    Mild episode of recurrent major depressive disorder ([facility])    Pseudopolyposis of colon without complication, unspecified part of colon [facility]     Current Outpatient Medications on File Prior to Visit   Medication Sig Dispense Refill    meclizine (ANTIVERT) 25 MG tablet Take 1 tablet by mouth 3 times daily as needed for Dizziness 30 tablet 1    spironolactone (ALDACTONE) 25 MG tablet TAKE 1 TABLET BY MOUTH DAILY      nitroGLYCERIN (NITROSTAT) 0.4 MG SL tablet Place 1 tablet under the tongue every 5 minutes as needed for Chest pain up to max of 3 total doses.  If no relief after 1 dose, call 911. 25 tablet 3    DULoxetine (CYMBALTA) 30 MG extended release capsule Take 3 capsules by mouth daily 180 capsule 3    fluticasone (ARNUITY ELLIPTA) 100 MCG/ACT AEPB Inhale 1 puff into the lungs daily 1 each 3    albuterol sulfate HFA (PROVENTIL;VENTOLIN;PROAIR) 108 (90 Base) MCG/ACT inhaler Inhale 2 puffs into the lungs every 6 hours as needed for Wheezing 1 each 3    albuterol (PROVENTIL) (2.5 MG/3ML) 0.083% nebulizer solution Take 3 mLs by nebulization every 6 hours as needed for Wheezing 120 each 3    Fluticasone-Umeclidin-Vilant (TRELEGY ELLIPTA) 200-62.5-25 MCG/INH AEPB Inhale 1 puff into the lungs daily 2 each 0    Fluticasone-Umeclidin-Vilant (TRELEGY ELLIPTA) 200-62.5-25 MCG/INH AEPB Inhale 1 puff into the lungs daily 2 each 0    glimepiride (AMARYL) 1 MG tablet Take 3 tablets by mouth every morning 270 tablet 3    rosuvastatin (CRESTOR) 20 MG tablet TAKE 1 TABLET BY MOUTH NIGHTLY 90 tablet 3    fluticasone (FLONASE) 50 MCG/ACT nasal spray 2 sprays by Nasal route daily 1 each 1    Lancets (ONETOUCH DELICA PLUS [id number]) MISC use as directed to Test fasting sugar and after largest meal of the day 200 each 10    torsemide (DEMADEX) 20 MG tablet Take 1 tablet by mouth daily 90 tablet 3    montelukast (SINGULAIR) 10 MG tablet Take 1 tablet by mouth nightly 90 tablet 3    metFORMIN (GLUCOPHAGE) 500 MG tablet Take 2 tablets by mouth Daily with supper 180 tablet 3    cholestyramine (QUESTRAN) 4 g packet Take 1 packet by mouth 2 times daily 30 packet 2    blood glucose monitor strips DX: diabetes mellitus.  Use fasting and after largest meal of the day - Ok to substitute per insurance 100 strip 10    Blood Glucose Monitoring Suppl ([address]) w/Device KIT USE AS DIRECTED      methocarbamol (ROBAXIN) 500 MG tablet Take 1 tablet by mouth twice a day      vitamin B-12 (CYANOCOBALAMIN) 500 MCG tablet Take 500 mcg by mouth daily      Omega-3 Fatty Acids (FISH OIL) 1000 MG CAPS Take 1 capsule by mouth 3 times daily 270 capsule 3    acetaminophen (TYLENOL) 500 MG tablet       blood glucose monitor kit and supplies Dispense sufficient amount for indicated testing frequency plus additional to accommodate PRN testing needs. Dispense all needed supplies to include: monitor, strips, lancing device, lancets, control solutions, alcohol swabs. 1 kit 0    gabapentin (NEURONTIN) 100 MG capsule Take 1 capsule by mouth 3 times daily for 90 days. 270 capsule 3     No current facility-administered medications on file prior to visit. Review of Systems   Constitutional:  Negative for chills, diaphoresis, fatigue and fever. HENT:  Negative for congestion, ear discharge, ear pain, rhinorrhea, sinus pressure, sinus pain, sneezing and sore throat. Respiratory:  Negative for cough, shortness of breath and wheezing. Cardiovascular:  Negative for chest pain. Gastrointestinal:  Negative for abdominal pain, diarrhea, nausea and vomiting. Endocrine: Negative for cold intolerance and heat intolerance. Genitourinary:  Negative for dysuria and frequency. Neurological:  Positive for numbness. Negative for dizziness and light-headedness. Objective:   /70   Pulse 95   Temp 97.9 °F (36.6 °C)   Resp 18   Ht 5' 6\" (1.676 m)   Wt (!) 317 lb 9.6 oz (144.1 kg)   LMP  (LMP Unknown)   SpO2 96%   BMI 51.26 kg/m²     Physical Exam  Constitutional:       General: She is not in acute distress. Appearance: She is not diaphoretic. Cardiovascular:      Rate and Rhythm: Normal rate and regular rhythm. Heart sounds: Normal heart sounds, S1 normal and S2 normal.   Pulmonary:      Effort: Pulmonary effort is normal. No respiratory distress. Breath sounds: Normal breath sounds. No wheezing or rales. Chest:      Chest wall: No tenderness. Abdominal:      General: Bowel sounds are normal.      Tenderness: There is no abdominal tenderness.    Musculoskeletal:      Comments: B/l radial pulses palpable  No erythema, swelling or ulcers b/l  No joint hypertrophy or TTP, joint ROM full in flexion, extension adduction and abduction  B/l thenar atrophy  Negative Phalen and Tinel b/l     Neurological:      Mental Status: She is alert. Assessment:       Diagnosis Orders   1. Carpal tunnel syndrome, bilateral  EMG    [address]    likely from T2DM       2. Type 2 diabetes mellitus without complication, without long-term current use of insulin (HCC) Controlled       3. Memory loss          Plan:      Orders Placed This Encounter   Procedures    [address], [address]     Referral Priority:   Routine     Referral Type:   Eval and Treat     Referral Reason:   Specialty Services Required     Referred to Provider:   Odilon Aj MD     Requested Specialty:   Orthopedic Surgery Sports Medicine     Number of Visits Requested:   1    EMG     Standing Status:   Future     Standing Expiration Date:   2/14/2023     Order Specific Question:   Which body part? Answer:   b/l hands     No orders of the defined types were placed in this encounter. Will make neuro appt   Return in about 6 months (around 6/16/2023) for follow up, with PCP.

## 2022-12-17 ASSESSMENT — ENCOUNTER SYMPTOMS
SHORTNESS OF BREATH: 0
DIARRHEA: 0
COUGH: 0
SINUS PAIN: 0
WHEEZING: 0
RHINORRHEA: 0
SORE THROAT: 0
SINUS PRESSURE: 0
ABDOMINAL PAIN: 0
VOMITING: 0
NAUSEA: 0

## 2022-12-30 ENCOUNTER — OFFICE VISIT (OUTPATIENT)
Dept: ORTHOPEDIC SURGERY | Age: 67
End: 2022-12-30

## 2022-12-30 VITALS
TEMPERATURE: 98.7 F | OXYGEN SATURATION: 96 % | WEIGHT: 293 LBS | HEIGHT: 66 IN | HEART RATE: 82 BPM | BODY MASS INDEX: 47.09 KG/M2

## 2022-12-30 DIAGNOSIS — R20.0 BILATERAL HAND NUMBNESS: Primary | ICD-10-CM

## 2022-12-30 ASSESSMENT — ENCOUNTER SYMPTOMS
EYES NEGATIVE: 1
ALLERGIC/IMMUNOLOGIC NEGATIVE: 1
RESPIRATORY NEGATIVE: 1
GASTROINTESTINAL NEGATIVE: 1

## 2022-12-30 NOTE — PROGRESS NOTES
Orthopedic Surgery and Sports Medicine    Subjective:      Patient ID: Saidnsharri Mcdaniels is a 69 y.o. female who presents today for:  Chief Complaint   Patient presents with    New Patient     Patient presents for bi-lateral carpal tunnel. CHARLES Herron is a 78-year-old female who presents today for evaluation of her bilateral hand numbness. It is worse on the left than right. She states that this has been present for approximately 4-5 months. She states that the numbness is in the tips of all of her fingertips. She also states that she has numbness in the tips of her toes. She does have some memory loss, so has difficulty with some details. She states that she thinks she had carpal tunnel in the past and wore bilateral wrist braces. She states that she does not know where these are. Otherwise she does not report any weakness in the hand. She denies dropping any objects or clumsiness in the hands. She previously saw Dr. Tyrone Sin who ordered an EMG. However this has not been done yet. Previous treatment: Bilateral wrist splints in the past  NSAIDs: No  Physical therapy: No    Hand Dominance: right  Occupation: Retired  Workers Compensation:   Have you missed work for this issue? No  Is this issue being addressed under a worker's compensation claim?  No    Past Medical History:   Diagnosis Date    Asthma     COPD (chronic obstructive pulmonary disease) ([facility] [address]     Depression     Diabetes mellitus ([facility] [address]     Sleep apnea       Past Surgical History:   Procedure Laterality Date    CHOLECYSTECTOMY      COLONOSCOPY N/A 4/19/2021    COLORECTAL CANCER SCREENING with polypectomies performed by Eloisa Santos MD at [address] [place] [address] History    Marital status:      Spouse name: Not on file    Number of children: Not on file    Years of education: Not on file    Highest education level: Not on file   Occupational History    Not on file   Tobacco Use    Smoking status: Former Packs/day: 1.00     Years: 40.00     Pack years: 40.00     Types: Cigarettes     Start date:      Quit date:      Years since quittin.0    Smokeless tobacco: Former     Quit date:    Vaping Use    Vaping Use: Never used   Substance and Sexual Activity    Alcohol use: Not Currently     Comment: occass    Drug use: Never    Sexual activity: Not on file   Other Topics Concern    Not on file   Social History Narrative    Not on file     Social Determinants of Health     Financial Resource Strain: Low Risk     Difficulty of Paying Living Expenses: Not hard at all   Food Insecurity: No Food Insecurity    Worried About Running Out of Food in the Last Year: Never true    [address] in the Last Year: Never true   Transportation Needs: No Transportation Needs    Lack of Transportation (Medical): No    Lack of Transportation (Non-Medical): No   Physical Activity: Inactive    Days of Exercise per Week: 0 days    Minutes of Exercise per Session: 0 min   Stress: Not on file   Social Connections: Not on file   Intimate Partner Violence: Not on file   Housing Stability: Not on file     Family History   Problem Relation Age of Onset    Breast Cancer Mother     Colon Cancer Neg Hx      Allergies   Allergen Reactions    Budesonide-Formoterol Fumarate      Other reaction(s): THRUSH  Other reaction(s): Other: See Comments  thrush     Current Outpatient Medications on File Prior to Visit   Medication Sig Dispense Refill    meclizine (ANTIVERT) 25 MG tablet Take 1 tablet by mouth 3 times daily as needed for Dizziness 30 tablet 1    spironolactone (ALDACTONE) 25 MG tablet TAKE 1 TABLET BY MOUTH DAILY      nitroGLYCERIN (NITROSTAT) 0.4 MG SL tablet Place 1 tablet under the tongue every 5 minutes as needed for Chest pain up to max of 3 total doses.  If no relief after 1 dose, call 911. 25 tablet 3    DULoxetine (CYMBALTA) 30 MG extended release capsule Take 3 capsules by mouth daily 180 capsule 3    fluticasone (ARNUITY ELLIPTA) 100 MCG/ACT AEPB Inhale 1 puff into the lungs daily 1 each 3    albuterol sulfate HFA (PROVENTIL;VENTOLIN;PROAIR) 108 (90 Base) MCG/ACT inhaler Inhale 2 puffs into the lungs every 6 hours as needed for Wheezing 1 each 3    albuterol (PROVENTIL) (2.5 MG/3ML) 0.083% nebulizer solution Take 3 mLs by nebulization every 6 hours as needed for Wheezing 120 each 3    Fluticasone-Umeclidin-Vilant (TRELEGY ELLIPTA) 200-62.5-25 MCG/INH AEPB Inhale 1 puff into the lungs daily 2 each 0    Fluticasone-Umeclidin-Vilant (TRELEGY ELLIPTA) 200-62.5-25 MCG/INH AEPB Inhale 1 puff into the lungs daily 2 each 0    glimepiride (AMARYL) 1 MG tablet Take 3 tablets by mouth every morning 270 tablet 3    rosuvastatin (CRESTOR) 20 MG tablet TAKE 1 TABLET BY MOUTH NIGHTLY 90 tablet 3    fluticasone (FLONASE) 50 MCG/ACT nasal spray 2 sprays by Nasal route daily 1 each 1    Lancets (ONETOUCH DELICA PLUS [id number]) MISC use as directed to Test fasting sugar and after largest meal of the day 200 each 10    torsemide (DEMADEX) 20 MG tablet Take 1 tablet by mouth daily 90 tablet 3    montelukast (SINGULAIR) 10 MG tablet Take 1 tablet by mouth nightly 90 tablet 3    metFORMIN (GLUCOPHAGE) 500 MG tablet Take 2 tablets by mouth Daily with supper 180 tablet 3    cholestyramine (QUESTRAN) 4 g packet Take 1 packet by mouth 2 times daily 30 packet 2    blood glucose monitor strips DX: diabetes mellitus.  Use fasting and after largest meal of the day - Ok to substitute per insurance 100 strip 10    Blood Glucose Monitoring Suppl ([address]) w/Device KIT USE AS DIRECTED      methocarbamol (ROBAXIN) 500 MG tablet Take 1 tablet by mouth twice a day      vitamin B-12 (CYANOCOBALAMIN) 500 MCG tablet Take 500 mcg by mouth daily      Omega-3 Fatty Acids (FISH OIL) 1000 MG CAPS Take 1 capsule by mouth 3 times daily 270 capsule 3    acetaminophen (TYLENOL) 500 MG tablet       blood glucose monitor kit and supplies Dispense sufficient amount for indicated testing frequency plus additional to accommodate PRN testing needs. Dispense all needed supplies to include: monitor, strips, lancing device, lancets, control solutions, alcohol swabs. 1 kit 0    gabapentin (NEURONTIN) 100 MG capsule Take 1 capsule by mouth 3 times daily for 90 days. 270 capsule 3     No current facility-administered medications on file prior to visit. Review of Systems   Constitutional: Negative. HENT: Negative. Eyes: Negative. Respiratory: Negative. Cardiovascular: Negative. Gastrointestinal: Negative. Endocrine: Negative. Genitourinary: Negative. Musculoskeletal: Negative. Skin: Negative. Allergic/Immunologic: Negative. Neurological:  Positive for numbness. Hematological: Negative. Psychiatric/Behavioral: Negative. Objective:   Pulse 82   Temp 98.7 °F (37.1 °C) (Temporal)   Ht 5' 6\" (1.676 m)   Wt (!) 317 lb (143.8 kg)   LMP  (LMP Unknown)   SpO2 96%   BMI 51.17 kg/m²     Physical Exam:  Ortho Exam    Right upper extremity: Skin intact. Brisk capillary refill to all digits. Able to demonstrate finger flexion/extension/abduction/adduction of fingers and flexion/extension at IP joint of thumb. Wrist with 50 degrees extension, 50 degrees flexion. Reports sensation intact to light touch across all fingers/thumb/hand. Able to make a full composite fist and extend to neutral. No instability across the fingers hand or wrist. No catching or clicking noted. No tenderness at the anatomic snuffbox, TFCC, radiocarpal joint, thumb CMC. 5/5 APB strength. Negative tinel's at the carpal tunnel. Negative tinels at the pronator, radial tunnel, cubital tunnel. Negative compression test, phalen's maneuver. Decreased sensation in the tips of all fingers. Left upper extremity: Skin intact. Brisk capillary refill to all digits.  Able to demonstrate finger flexion/extension/abduction/adduction of fingers and flexion/extension at IP joint of thumb. Wrist with 50 degrees extension, 50 degrees flexion. Reports sensation intact to light touch across all fingers/thumb/hand. Able to make a full composite fist and extend to neutral. No instability across the fingers hand or wrist. No catching or clicking noted. No tenderness at the anatomic snuffbox, TFCC, radiocarpal joint, thumb CMC. 5/5 APB strength. Negative tinel's at the carpal tunnel. Negative tinels at the pronator, radial tunnel, cubital tunnel. Negative compression test, phalen's maneuver. Decreased sensation in the tips of all fingers. Radiographs and Laboratory Studies:     Diagnostic Imaging Studies:    None    Laboratory Studies:   Lab Results   Component Value Date    WBC 9.2 11/07/2022    HGB 14.1 11/07/2022    HCT 41.4 11/07/2022    MCV 92.4 11/07/2022     11/07/2022     No results found for: SEDRATE  No results found for: CRP    Assessment:      Diagnosis Orders   1. Bilateral hand numbness  Who cock-up nonmolde pre ots    EMG        Constanza  is a 69 y.o. female with a history and exam consistent with bilateral hand numbness. This is an acute exacerbation of a chronic condition . Plan:     I had a discussion with the patient regarding her exam and diagnosis. Overall the numbness in her bilateral hands is somewhat vague. She reports that this is present in all fingertips. She also does report that this is present in her toes. Therefore I recommend that she get a bilateral EMG to further determine if this is indeed carpal tunnel syndrome versus some sort of other peripheral neuropathy. She does report a history of carpal tunnel syndrome. In the meantime she was provided with bilateral Velcro removable wrist braces. She should follow-up with me after EMG to discuss the results. Orders Placed This Encounter   Procedures    EMG     Standing Status:   Future     Standing Expiration Date:   2/28/2023     Order Specific Question:   Which body part?      Answer: bilateral upper extremities    Who cock-up nonmolde pre ots     Patient was prescribed a Breg Wrist Cock-Up Brace . The bilateral wrist will require stabilization / immobilization from this semi-rigid / rigid orthosis to improve their function. The orthosis will assist in protecting the affected area, provide functional support and facilitate healing. The patient was educated and fit by a healthcare professional with expert knowledge and specialization in brace application while under the direct supervision of the treating physician. Verbal and written instructions for the use of and application of this item were provided. They were instructed to contact the office immediately should the brace result in increased pain, decreased sensation, increased swelling or worsening of the condition. Return in about 3 weeks (around 1/20/2023) for after EMG for review.       Morgan Dunlap MD

## 2023-01-09 DIAGNOSIS — E11.9 TYPE 2 DIABETES MELLITUS WITHOUT COMPLICATION, WITHOUT LONG-TERM CURRENT USE OF INSULIN (HCC): ICD-10-CM

## 2023-01-10 DIAGNOSIS — E11.9 TYPE 2 DIABETES MELLITUS WITHOUT COMPLICATION, WITHOUT LONG-TERM CURRENT USE OF INSULIN (HCC): ICD-10-CM

## 2023-01-11 RX ORDER — BLOOD SUGAR DIAGNOSTIC
STRIP MISCELLANEOUS
Qty: 100 STRIP | Refills: 10 | OUTPATIENT
Start: 2023-01-11

## 2023-01-11 RX ORDER — METHOCARBAMOL 500 MG/1
TABLET, FILM COATED ORAL
Qty: 60 TABLET | Refills: 4 | Status: CANCELLED | OUTPATIENT
Start: 2023-01-11

## 2023-01-11 RX ORDER — BACLOFEN 10 MG/1
5 TABLET ORAL 2 TIMES DAILY PRN
Qty: 30 TABLET | Refills: 3 | Status: CANCELLED | OUTPATIENT
Start: 2023-01-11

## 2023-01-11 RX ORDER — TIZANIDINE 2 MG/1
2 TABLET ORAL EVERY 8 HOURS PRN
Qty: 60 TABLET | Refills: 3 | Status: SHIPPED | OUTPATIENT
Start: 2023-01-11

## 2023-01-11 RX ORDER — GLUCOSAMINE HCL/CHONDROITIN SU 500-400 MG
CAPSULE ORAL
Qty: 100 STRIP | Refills: 10 | Status: SHIPPED | OUTPATIENT
Start: 2023-01-11

## 2023-01-11 NOTE — TELEPHONE ENCOUNTER
So I see she requested 3 different muscle relaxants: Baclofen, zanaflex and Robaxin. She really doesn't need all 3. So I refilled just zanaflex and Dced the other 2. But is there a preference for any of the other 2?

## 2023-02-01 ENCOUNTER — HOSPITAL ENCOUNTER (OUTPATIENT)
Dept: NEUROLOGY | Age: 68
Discharge: HOME | End: 2023-02-01
Payer: MEDICARE

## 2023-02-01 DIAGNOSIS — G56.03 CARPAL TUNNEL SYNDROME, BILATERAL: ICD-10-CM

## 2023-02-01 DIAGNOSIS — R20.0 BILATERAL HAND NUMBNESS: ICD-10-CM

## 2023-02-01 PROCEDURE — 95911 NRV CNDJ TEST 9-10 STUDIES: CPT

## 2023-02-01 PROCEDURE — 95886 MUSC TEST DONE W/N TEST COMP: CPT

## 2023-02-01 NOTE — PROCEDURES
[address]                      [facility], [address] [facility]                             ELECTROMYOGRAM REPORT    PATIENT NAME: Lindajo Leyden                     :        1955  MED REC NO:   [id number]                            ROOM:  ACCOUNT NO:   [de-identified]                           ADMIT DATE: 2023  PROVIDER:     Evelyn Montez MD    DATE OF EM2023    REASON FOR STUDY:  Neurophysiological studies are requested for the  patient's numbness in her hands. FINDINGS:  MOTOR NERVE CONDUCTION STUDIES:  Motor nerve conduction study of the  right median nerve shows normal peak latency, amplitudes, and conduction  velocity. Motor nerve conduction study of the left median nerve shows  normal-to-borderline peak latencies with borderline amplitudes and  normal conduction velocity. The right ulnar nerve motor nerve  conduction study shows normal amplitudes, latency, and conduction  velocity. The left ulnar nerve motor nerve conduction study shows  normal peak latencies and amplitudes and decreased conduction velocities  seen throughout. SENSORY NERVE CONDUCTION STUDIES:  Sensory nerve conduction study of the  right median nerve recorded in digit 2 shows prolonged latency, normal  amplitudes, and decreased conduction velocity. Further mid palm  stimulation was obtained to confirm findings and shows prolonged  latencies, normal amplitudes, and decreased conduction velocity. The  left median digit 2 examination shows prolonged latency, low amplitudes,  and decreased conduction velocity. The left median mid palm stimulation  shows prolonged latencies, normal amplitudes, and decreased conduction  velocity. The right ulnar digit 2 examination shows normal amplitudes,  latency, and conduction velocity. The left ulnar digit 2 examination  shows normal amplitudes, latency, and conduction velocity.   The right  ulnar mixed orthodromic study shows normal peak latency, low amplitudes,  and normal conduction velocity. The left ulnar mixed orthodromic study  shows minimally prolonged latencies, normal amplitudes, and decreased  conduction velocity. The radial sensory nerve conduction studies are  normal.    Needle electrode examination of the above-sampled muscles shows  decreased activated units seen in the abductor pollicis brevis muscle,  mostly on the left. IMPRESSION:  Moderate bilateral median nerve neuropathies at the wrists  suggesting carpal tunnel syndrome. These findings are based on  prolonged latencies seen both in the antidromic and orthodromic sensory  nerve recordings. There is no suggestion of any ulnar nerve  dysfunctions to suggest a _____ neuropathy. Multiple sensory nerve conduction studies are evaluated to avoid any  artifact of temperature differences. This test is personally performed and interpreted by me.         Aashish Ferrera MD    D: 02/01/2023 12:07:16       T: 02/01/2023 12:09:38     [email or web address]  Job#: [id number]     Doc#: [id number]    CC:

## 2023-02-06 ENCOUNTER — OFFICE VISIT (OUTPATIENT)
Dept: ORTHOPEDIC SURGERY | Age: 68
End: 2023-02-06

## 2023-02-06 DIAGNOSIS — G56.03 BILATERAL CARPAL TUNNEL SYNDROME: Primary | ICD-10-CM

## 2023-02-06 ASSESSMENT — ENCOUNTER SYMPTOMS
GASTROINTESTINAL NEGATIVE: 1
RESPIRATORY NEGATIVE: 1
EYES NEGATIVE: 1
ALLERGIC/IMMUNOLOGIC NEGATIVE: 1

## 2023-02-06 NOTE — PROGRESS NOTES
Orthopedic Surgery and Sports Medicine    Subjective:      Patient ID: Cristino Monge is a 77 y.o. female who presents today for:  Chief Complaint   Patient presents with    Follow-up     Follow up visit for Bilateral hand numbness and EMG results       HPI  Jonathan meyer is a 73-year-old female who presents today for follow-up evaluation of her bilateral hand numbness. In brief she had had bilateral hand numbness, worse on the left than the right for approximately 5 months. The numbness was in the tips of all of her fingers. She was sent for an EMG after her last visit. She comes in today for EMG result review. She has been wearing bilateral Velcro wrist splints. She reports improvement in the numbness with the splints. She states that the numbness did completely resolved but has returned slightly. Of note she also does think that she had carpal tunnel in the past with a prior cortisone injection and relief of symptoms. Previous treatment: Bilateral wrist splints  NSAIDs: No  Physical therapy: No     Hand Dominance: right  Occupation: Retired  Workers Compensation:   Have you missed work for this issue? No  Is this issue being addressed under a worker's compensation claim?  No    Past Medical History:   Diagnosis Date    Asthma     COPD (chronic obstructive pulmonary disease) ([facility] [address]     Depression     Diabetes mellitus ([facility] [address]     Sleep apnea       Past Surgical History:   Procedure Laterality Date    CHOLECYSTECTOMY      COLONOSCOPY N/A 4/19/2021    COLORECTAL CANCER SCREENING with polypectomies performed by Samantha Garza MD at [address] [place] [address] History    Marital status:      Spouse name: Not on file    Number of children: Not on file    Years of education: Not on file    Highest education level: Not on file   Occupational History    Not on file   Tobacco Use    Smoking status: Former     Packs/day: 1.00     Years: 40.00     Pack years: 40.00     Types: Cigarettes Start date:      Quit date:      Years since quittin.1    Smokeless tobacco: Former     Quit date:    Vaping Use    Vaping Use: Never used   Substance and Sexual Activity    Alcohol use: Not Currently     Comment: occass    Drug use: Never    Sexual activity: Not on file   Other Topics Concern    Not on file   Social History Narrative    Not on file     Social Determinants of Health     Financial Resource Strain: Low Risk     Difficulty of Paying Living Expenses: Not hard at all   Food Insecurity: No Food Insecurity    Worried About [employer] [employer] in the Last Year: Never true    [address] [employer] in the Last Year: Never true   Transportation Needs: No Transportation Needs    Lack of Transportation (Medical): No    Lack of Transportation (Non-Medical): No   Physical Activity: Inactive    Days of Exercise per Week: 0 days    Minutes of Exercise per Session: 0 min   Stress: Not on file   Social Connections: Not on file   Intimate Partner Violence: Not on file   Housing Stability: Not on file     Family History   Problem Relation Age of Onset    Breast Cancer Mother     Colon Cancer Neg Hx      Allergies   Allergen Reactions    Budesonide-Formoterol Fumarate      Other reaction(s): THRUSH  Other reaction(s): Other: See Comments  thrush     Current Outpatient Medications on File Prior to Visit   Medication Sig Dispense Refill    blood glucose monitor strips DX: diabetes mellitus.  Use fasting and after largest meal of the day - Ok to substitute per insurance 100 strip 10    tiZANidine (ZANAFLEX) 2 MG tablet Take 1 tablet by mouth every 8 hours as needed (muscle spasms) 60 tablet 3    meclizine (ANTIVERT) 25 MG tablet Take 1 tablet by mouth 3 times daily as needed for Dizziness 30 tablet 1    spironolactone (ALDACTONE) 25 MG tablet TAKE 1 TABLET BY MOUTH DAILY      nitroGLYCERIN (NITROSTAT) 0.4 MG SL tablet Place 1 tablet under the tongue every 5 minutes as needed for Chest pain up to max of 3 total doses. If no relief after 1 dose, call 911. 25 tablet 3    DULoxetine (CYMBALTA) 30 MG extended release capsule Take 3 capsules by mouth daily 180 capsule 3    fluticasone (ARNUITY ELLIPTA) 100 MCG/ACT AEPB Inhale 1 puff into the lungs daily 1 each 3    albuterol sulfate HFA (PROVENTIL;VENTOLIN;PROAIR) 108 (90 Base) MCG/ACT inhaler Inhale 2 puffs into the lungs every 6 hours as needed for Wheezing 1 each 3    albuterol (PROVENTIL) (2.5 MG/3ML) 0.083% nebulizer solution Take 3 mLs by nebulization every 6 hours as needed for Wheezing 120 each 3    Fluticasone-Umeclidin-Vilant (TRELEGY ELLIPTA) 200-62.5-25 MCG/INH AEPB Inhale 1 puff into the lungs daily 2 each 0    glimepiride (AMARYL) 1 MG tablet Take 3 tablets by mouth every morning 270 tablet 3    rosuvastatin (CRESTOR) 20 MG tablet TAKE 1 TABLET BY MOUTH NIGHTLY 90 tablet 3    fluticasone (FLONASE) 50 MCG/ACT nasal spray 2 sprays by Nasal route daily 1 each 1    Lancets (ONETOUCH DELICA PLUS [id number]) MISC use as directed to Test fasting sugar and after largest meal of the day 200 each 10    torsemide (DEMADEX) 20 MG tablet Take 1 tablet by mouth daily 90 tablet 3    montelukast (SINGULAIR) 10 MG tablet Take 1 tablet by mouth nightly 90 tablet 3    metFORMIN (GLUCOPHAGE) 500 MG tablet Take 2 tablets by mouth Daily with supper 180 tablet 3    cholestyramine (QUESTRAN) 4 g packet Take 1 packet by mouth 2 times daily 30 packet 2    Blood Glucose Monitoring Suppl (ONETOUCH VERIO FLEX SYSTEM) w/Device KIT USE AS DIRECTED      vitamin B-12 (CYANOCOBALAMIN) 500 MCG tablet Take 500 mcg by mouth daily      Omega-3 Fatty Acids (FISH OIL) 1000 MG CAPS Take 1 capsule by mouth 3 times daily 270 capsule 3    acetaminophen (TYLENOL) 500 MG tablet       blood glucose monitor kit and supplies Dispense sufficient amount for indicated testing frequency plus additional to accommodate PRN testing needs.  Dispense all needed supplies to include: monitor, strips, lancing device, lancets, control solutions, alcohol swabs. 1 kit 0    gabapentin (NEURONTIN) 100 MG capsule Take 1 capsule by mouth 3 times daily for 90 days. 270 capsule 3     No current facility-administered medications on file prior to visit. Review of Systems   Constitutional: Negative. HENT: Negative. Eyes: Negative. Respiratory: Negative. Cardiovascular: Negative. Gastrointestinal: Negative. Genitourinary: Negative. Musculoskeletal: Negative. Skin: Negative. Allergic/Immunologic: Negative. Neurological:  Positive for numbness. Hematological: Negative. Psychiatric/Behavioral: Negative. Objective:   LMP  (LMP Unknown)     Physical Exam:  Ortho Exam    Right upper extremity: Skin intact. Brisk capillary refill to all digits. Able to demonstrate finger flexion/extension/abduction/adduction of fingers and flexion/extension at IP joint of thumb. Wrist with 50 degrees extension, 50 degrees flexion. Reports sensation intact to light touch across all fingers/thumb/hand. Able to make a full composite fist and extend to neutral. No instability across the fingers hand or wrist. No catching or clicking noted. No tenderness at the anatomic snuffbox, TFCC, radiocarpal joint, thumb CMC. 5/5 APB strength. Negative tinel's at the carpal tunnel. Negative tinels at the pronator, radial tunnel, cubital tunnel. Negative compression test, phalen's maneuver. Decreased sensation in the tips of all fingers. Left upper extremity: Skin intact. Brisk capillary refill to all digits. Able to demonstrate finger flexion/extension/abduction/adduction of fingers and flexion/extension at IP joint of thumb. Wrist with 50 degrees extension, 50 degrees flexion. Reports sensation intact to light touch across all fingers/thumb/hand. Able to make a full composite fist and extend to neutral. No instability across the fingers hand or wrist. No catching or clicking noted.  No tenderness at the anatomic snuffbox, TFCC, radiocarpal joint, thumb CMC. 5/5 APB strength. Negative tinel's at the carpal tunnel. Negative tinels at the pronator, radial tunnel, cubital tunnel. Negative compression test, phalen's maneuver. Decreased sensation in the tips of all fingers. Radiographs and Laboratory Studies:     Diagnostic Imaging Studies:    EMG dated 2/1/2023  IMPRESSION:  Moderate bilateral median nerve neuropathies at the wrists  suggesting carpal tunnel syndrome. These findings are based on  prolonged latencies seen both in the antidromic and orthodromic sensory  nerve recordings. There is no suggestion of any ulnar nerve  dysfunctions to suggest a neuropathy. Laboratory Studies:   Lab Results   Component Value Date    WBC 9.2 11/07/2022    HGB 14.1 11/07/2022    HCT 41.4 11/07/2022    MCV 92.4 11/07/2022     11/07/2022     No results found for: SEDRATE  No results found for: CRP    Assessment:      Diagnosis Orders   1. Bilateral carpal tunnel syndrome          Lawrence Levine is a 77 y.o. female with a history and exam consistent with bilateral carpal tunnel syndrome. Plan:     I had a discussion with the patient regarding her exam, EMG findings, diagnosis. Overall clinically she reports improvement in her symptoms with the wrist braces. Her EMG shows moderate bilateral carpal tunnel syndrome. We discussed continuing with nonoperative treatment versus surgical intervention. She has had good response with the wrist braces. She also states that she had a good response to a cortisone injection years ago. After discussion, we have decided to continue with conservative treatment for her carpal tunnel syndrome. She is going to continue wearing her wrist braces. We discussed the utility of a cortisone injection in the future if needed. We also discussed surgical intervention which would be an open carpal tunnel release.   At this time she is going to continue with conservative treatment and she will follow-up with me as needed if her symptoms do not improve or worsen. Return if symptoms worsen or fail to improve.       Adrian Main MD

## 2023-02-07 ENCOUNTER — OFFICE VISIT (OUTPATIENT)
Dept: PULMONOLOGY | Age: 68
End: 2023-02-07
Payer: MEDICARE

## 2023-02-07 VITALS
OXYGEN SATURATION: 97 % | HEIGHT: 66 IN | BODY MASS INDEX: 47.09 KG/M2 | SYSTOLIC BLOOD PRESSURE: 130 MMHG | DIASTOLIC BLOOD PRESSURE: 64 MMHG | HEART RATE: 86 BPM | WEIGHT: 293 LBS

## 2023-02-07 DIAGNOSIS — Z87.891 PERSONAL HISTORY OF TOBACCO USE: ICD-10-CM

## 2023-02-07 DIAGNOSIS — I51.89 DIASTOLIC DYSFUNCTION: ICD-10-CM

## 2023-02-07 DIAGNOSIS — J45.40 MODERATE PERSISTENT ASTHMA WITHOUT COMPLICATION: ICD-10-CM

## 2023-02-07 DIAGNOSIS — E66.01 CLASS 3 SEVERE OBESITY DUE TO EXCESS CALORIES WITH SERIOUS COMORBIDITY AND BODY MASS INDEX (BMI) OF 50.0 TO 59.9 IN ADULT (HCC): ICD-10-CM

## 2023-02-07 DIAGNOSIS — R91.1 LUNG NODULE: ICD-10-CM

## 2023-02-07 DIAGNOSIS — G47.34 NOCTURNAL HYPOXIA: ICD-10-CM

## 2023-02-07 DIAGNOSIS — J44.9 ASTHMA-COPD OVERLAP SYNDROME (HCC): Primary | ICD-10-CM

## 2023-02-07 DIAGNOSIS — G47.33 OSA (OBSTRUCTIVE SLEEP APNEA): ICD-10-CM

## 2023-02-07 PROCEDURE — 1123F ACP DISCUSS/DSCN MKR DOCD: CPT | Performed by: INTERNAL MEDICINE

## 2023-02-07 PROCEDURE — 99214 OFFICE O/P EST MOD 30 MIN: CPT | Performed by: INTERNAL MEDICINE

## 2023-02-07 PROCEDURE — G0296 VISIT TO DETERM LDCT ELIG: HCPCS | Performed by: INTERNAL MEDICINE

## 2023-02-07 RX ORDER — FLUTICASONE FUROATE 100 UG/1
1 POWDER RESPIRATORY (INHALATION) DAILY
Qty: 1 EACH | Refills: 3 | Status: SHIPPED | OUTPATIENT
Start: 2023-02-07

## 2023-02-07 NOTE — PROGRESS NOTES
Subjective:     Damion Lubin is a 77 y.o. female who complains today of:     Chief Complaint   Patient presents with    Follow-up    Cough    Asthma       HPI  Patient presents for asthma COPD overlap syndrome    Symptoms controlled, she has dyspnea mainly on exertion, minimal coughing, no chest pain, weight is stable, no lower extremity edema, no fever no chills, no nasal congestion or postnasal drip and no heartburn. She is currently on Trelegy and Singulair, she has not started Arnuity yet  She uses CPAP every day.       Allergies:  Budesonide-formoterol fumarate  Past Medical History:   Diagnosis Date    Asthma     COPD (chronic obstructive pulmonary disease) ([facility])     Depression     Diabetes mellitus ([facility] [address]     Sleep apnea      Past Surgical History:   Procedure Laterality Date    CHOLECYSTECTOMY      COLONOSCOPY N/A 2021    COLORECTAL CANCER SCREENING with polypectomies performed by Sarkis De Los Santos MD at [facility]     Family History   Problem Relation Age of Onset    Breast Cancer Mother     Colon Cancer Neg Hx      Social History     Socioeconomic History    Marital status:      Spouse name: Not on file    Number of children: Not on file    Years of education: Not on file    Highest education level: Not on file   Occupational History    Not on file   Tobacco Use    Smoking status: Former     Packs/day: 1.00     Years: 40.00     Pack years: 40.00     Types: Cigarettes     Start date:      Quit date:      Years since quittin.1    Smokeless tobacco: Former     Quit date:    Vaping Use    Vaping Use: Never used   Substance and Sexual Activity    Alcohol use: Not Currently     Comment: occass    Drug use: Never    Sexual activity: Not on file   Other Topics Concern    Not on file   Social History Narrative    Not on file     Social Determinants of Health     Financial Resource Strain: Low Risk     Difficulty of Paying Living Expenses: Not hard at all   Food Insecurity: No Food Insecurity    Worried About Running Out of Food in the Last Year: Never true    Ran Out of Food in the Last Year: Never true   Transportation Needs: No Transportation Needs    Lack of Transportation (Medical): No    Lack of Transportation (Non-Medical): No   Physical Activity: Inactive    Days of Exercise per Week: 0 days    Minutes of Exercise per Session: 0 min   Stress: Not on file   Social Connections: Not on file   Intimate Partner Violence: Not on file   Housing Stability: Not on file         Review of Systems      ROS: 10 organs review of system is done including general, psychological, ENT, hematological, endocrine, respiratory, cardiovascular, gastrointestinal,musculoskeletal, neurological,  allergy and Immunology is done and is otherwise negative. Current Outpatient Medications   Medication Sig Dispense Refill    fluticasone (ARNUITY ELLIPTA) 100 MCG/ACT AEPB Inhale 1 puff into the lungs daily 1 each 3    blood glucose monitor strips DX: diabetes mellitus. Use fasting and after largest meal of the day - Ok to substitute per insurance 100 strip 10    tiZANidine (ZANAFLEX) 2 MG tablet Take 1 tablet by mouth every 8 hours as needed (muscle spasms) 60 tablet 3    meclizine (ANTIVERT) 25 MG tablet Take 1 tablet by mouth 3 times daily as needed for Dizziness 30 tablet 1    spironolactone (ALDACTONE) 25 MG tablet TAKE 1 TABLET BY MOUTH DAILY      nitroGLYCERIN (NITROSTAT) 0.4 MG SL tablet Place 1 tablet under the tongue every 5 minutes as needed for Chest pain up to max of 3 total doses.  If no relief after 1 dose, call 911. 25 tablet 3    DULoxetine (CYMBALTA) 30 MG extended release capsule Take 3 capsules by mouth daily 180 capsule 3    albuterol sulfate HFA (PROVENTIL;VENTOLIN;PROAIR) 108 (90 Base) MCG/ACT inhaler Inhale 2 puffs into the lungs every 6 hours as needed for Wheezing 1 each 3    albuterol (PROVENTIL) (2.5 MG/3ML) 0.083% nebulizer solution Take 3 mLs by nebulization every 6 hours as needed for Wheezing 120 each 3    Fluticasone-Umeclidin-Vilant (TRELEGY ELLIPTA) 200-62.5-25 MCG/INH AEPB Inhale 1 puff into the lungs daily 2 each 0    glimepiride (AMARYL) 1 MG tablet Take 3 tablets by mouth every morning 270 tablet 3    rosuvastatin (CRESTOR) 20 MG tablet TAKE 1 TABLET BY MOUTH NIGHTLY 90 tablet 3    fluticasone (FLONASE) 50 MCG/ACT nasal spray 2 sprays by Nasal route daily 1 each 1    Lancets (ONETOUCH DELICA PLUS [id number]) MISC use as directed to Test fasting sugar and after largest meal of the day 200 each 10    torsemide (DEMADEX) 20 MG tablet Take 1 tablet by mouth daily 90 tablet 3    montelukast (SINGULAIR) 10 MG tablet Take 1 tablet by mouth nightly 90 tablet 3    metFORMIN (GLUCOPHAGE) 500 MG tablet Take 2 tablets by mouth Daily with supper 180 tablet 3    cholestyramine (QUESTRAN) 4 g packet Take 1 packet by mouth 2 times daily 30 packet 2    Blood Glucose Monitoring Suppl (ONETOUCH VERIO FLEX SYSTEM) w/Device KIT USE AS DIRECTED      vitamin B-12 (CYANOCOBALAMIN) 500 MCG tablet Take 500 mcg by mouth daily      Omega-3 Fatty Acids (FISH OIL) 1000 MG CAPS Take 1 capsule by mouth 3 times daily 270 capsule 3    acetaminophen (TYLENOL) 500 MG tablet       blood glucose monitor kit and supplies Dispense sufficient amount for indicated testing frequency plus additional to accommodate PRN testing needs. Dispense all needed supplies to include: monitor, strips, lancing device, lancets, control solutions, alcohol swabs. 1 kit 0    gabapentin (NEURONTIN) 100 MG capsule Take 1 capsule by mouth 3 times daily for 90 days. 270 capsule 3     No current facility-administered medications for this visit. Objective:     Vitals:    02/07/23 1356   BP: 130/64   Site: Right Lower Arm   Position: Sitting   Cuff Size: Medium Adult   Pulse: 86   SpO2: 97%   Weight: (!) 322 lb (146.1 kg)   Height: 5' 6\" (1.676 m)         Physical Exam  Constitutional:       General: She is not in acute distress.      Appearance: She is well-developed. She is not diaphoretic. HENT:      Head: Normocephalic and atraumatic. Eyes:      Conjunctiva/sclera: Conjunctivae normal.      Pupils: Pupils are equal, round, and reactive to light. Cardiovascular:      Rate and Rhythm: Normal rate and regular rhythm. Heart sounds: No murmur heard. No friction rub. No gallop. Pulmonary:      Effort: Pulmonary effort is normal. No respiratory distress. Breath sounds: Normal breath sounds. No wheezing or rales. Chest:      Chest wall: No tenderness. Abdominal:      General: There is no distension. Palpations: Abdomen is soft. Tenderness: There is no abdominal tenderness. There is no rebound. Musculoskeletal:         General: No tenderness. Cervical back: Normal range of motion and neck supple. Right lower leg: No edema. Left lower leg: No edema. Lymphadenopathy:      Cervical: No cervical adenopathy. Skin:     General: Skin is warm and dry. Findings: No erythema. Neurological:      Mental Status: She is alert and oriented to person, place, and time. Psychiatric:         Judgment: Judgment normal.       Imaging studies reviewed and interpreted by me CT chest December 2022, tiny groundglass nodule in the left upper lobe. Lab results reviewed in chart  PFT May 2021, FEV1 77% FEV1/FVC 0.62 with positive response to bronchodilator   ECHO: April 8939, EF 14%, diastolic dysfunction  Sleep study shows AHI 14.5      Assessment and Plan       Diagnosis Orders   1. Asthma-COPD overlap syndrome (HCC)  fluticasone (ARNUITY ELLIPTA) 100 MCG/ACT AEPB    Full PFT Study With Bronchodilator      2. Moderate persistent asthma without complication  fluticasone (ARNUITY ELLIPTA) 100 MCG/ACT AEPB    Full PFT Study With Bronchodilator      3. FROYLAN (obstructive sleep apnea)        4. Nocturnal hypoxia        5.  Class 3 severe obesity due to excess calories with serious comorbidity and body mass index (BMI) of 50.0 to 59.9 in adult [facility])        6. Diastolic dysfunction        7. Personal history of tobacco use  [facility] VISIT TO DISCUSS LUNG CA SCREEN W LDCT    CT Lung Screen (Annual/Baseline)      8. Lung nodule          Continue Trelegy   We will reorder Arnuity,  Repeat PFT  Continue CPAP while asleep, patient reports compliance, requested compliance report  O2 bleed 2 L/min while asleep  Continue cardioprotective medications and avoid volume overload  Weight loss is recommended  CT lung cancer screening in December of this year  Your flu shot, COVID-19 vaccination and pneumonia vaccine is recommended        Orders Placed This Encounter   Procedures    Full PFT Study With Bronchodilator     Standing Status:   Future     Standing Expiration Date:   8/7/2024     Orders Placed This Encounter   Medications    fluticasone (ARNUITY ELLIPTA) 100 MCG/ACT AEPB     Sig: Inhale 1 puff into the lungs daily     Dispense:  1 each     Refill:  3            Discussed with patient the importance of exercise and weight control and  overall health and well-being. Reviewed with the patient: current clinical status, medications, activities and diet. Side effects, adverse effects of the medication prescribed today, as well as treatment plan and result expectations have been discussed with the patient who expresses understanding and desires to proceed. Return in about 6 months (around 8/7/2023). Chelsey Hall MD  Discussed with the patient the current USPSTF guidelines released March 9, 2021 for screening for lung cancer. For adults aged 48 to [de-identified] years who have a 20 pack-year smoking history and currently smoke or have quit within the past 15 years the grade B recommendation is to:  Screen for lung cancer with low-dose computed tomography (LDCT) every year. Stop screening once a person has not smoked for 15 years or has a health problem that limits life expectancy or the ability to have lung surgery.     The patient  reports that she quit smoking about 12 years ago. Her smoking use included cigarettes. She started smoking about 52 years ago. She has a 40.00 pack-year smoking history. She quit smokeless tobacco use about 12 years ago. . Discussed with patient the risks and benefits of screening, including over-diagnosis, false positive rate, and total radiation exposure. The patient currently exhibits no signs or symptoms suggestive of lung cancer. Discussed with patient the importance of compliance with yearly annual lung cancer screenings and willingness to undergo diagnosis and treatment if screening scan is positive. In addition, the patient was counseled regarding the importance of remaining smoke free and/or total smoking cessation.     Also reviewed the following if the patient has Medicare that as of February 10, 2022, Medicare only covers LDCT screening in patients aged 51-72 with at least a 20 pack-year smoking history who currently smoke or have quit in the last 15 years

## 2023-02-07 NOTE — PATIENT INSTRUCTIONS

## 2023-02-09 NOTE — TELEPHONE ENCOUNTER
Rx requested:    LOT # [id number]    EXP:  7/24      Requested Prescriptions     Pending Prescriptions Disp Refills    fluticasone-umeclidin-vilant ([place]) 200-62.5-25 MCG/ACT AEPB inhaler       Sig: Inhale 1 puff into the lungs daily       Last Office Visit:   2/7/2023      Next Visit Date:  Future Appointments   Date Time Provider Preet [place]   2/14/2023  8:00 AM LUIS PFT ROOM 1 MLOZ PFT [address] [place] [address]   6/16/2023 11:30 [facility]   8/8/2023  1:15 PM Millicent Magana MD [facility]

## 2023-02-14 ENCOUNTER — HOSPITAL ENCOUNTER (OUTPATIENT)
Dept: PULMONOLOGY | Age: 68
Discharge: HOME | End: 2023-02-14
Payer: MEDICARE

## 2023-02-14 DIAGNOSIS — J44.9 ASTHMA-COPD OVERLAP SYNDROME (HCC): ICD-10-CM

## 2023-02-14 DIAGNOSIS — J45.40 MODERATE PERSISTENT ASTHMA WITHOUT COMPLICATION: ICD-10-CM

## 2023-02-14 PROCEDURE — 94726 PLETHYSMOGRAPHY LUNG VOLUMES: CPT

## 2023-02-14 PROCEDURE — 94060 EVALUATION OF WHEEZING: CPT

## 2023-02-14 PROCEDURE — 94729 DIFFUSING CAPACITY: CPT

## 2023-02-14 PROCEDURE — 6360000002 HC RX W HCPCS

## 2023-02-14 RX ORDER — ALBUTEROL SULFATE 2.5 MG/3ML
SOLUTION RESPIRATORY (INHALATION)
Status: COMPLETED
Start: 2023-02-14 | End: 2023-02-14

## 2023-02-14 RX ADMIN — ALBUTEROL SULFATE 2.5 MG: 2.5 SOLUTION RESPIRATORY (INHALATION) at 08:12

## 2023-02-17 NOTE — PROCEDURES
[address] [address]                    PULMONARY FUNCTION  Carrie Clark   68 y.o.   female     Test interpretation     Spirometry meets ATS criteria for mild obstructive airway disease with no significant response to bronchodilator  Lung volume are normal except for reduced ERV due to obesity  Diffusion capacity is normal       Clinical correlation is recommended     Chirag Mensah MD [facility], 2/17/2023 9:08 AM

## 2023-02-24 PROBLEM — R20.0 BILATERAL HAND NUMBNESS: Status: ACTIVE | Noted: 2023-02-24

## 2023-03-07 RX ORDER — MONTELUKAST SODIUM 10 MG/1
10 TABLET ORAL NIGHTLY
Qty: 90 TABLET | Refills: 3 | Status: SHIPPED | OUTPATIENT
Start: 2023-03-07

## 2023-03-15 DIAGNOSIS — J44.9 ASTHMA-COPD OVERLAP SYNDROME (HCC): Primary | ICD-10-CM

## 2023-03-15 NOTE — TELEPHONE ENCOUNTER
Please approve for samples    Rx requested:  Requested Prescriptions     Pending Prescriptions Disp Refills    fluticasone-umeclidin-vilant (Celine Gather) 200-62.5-25 MCG/ACT AEPB inhaler 2 each 0     Sig: Inhale 1 puff into the lungs daily       Last Office Visit:   2/7/2023      Next Visit Date:  Future Appointments   Date Time Provider Preet [place]   6/16/2023 11:30 [facility]   8/8/2023  1:15 PM Dominique Medina MD [facility]

## 2023-03-16 RX ORDER — FLUTICASONE FUROATE, UMECLIDINIUM BROMIDE AND VILANTEROL TRIFENATATE 200; 62.5; 25 UG/1; UG/1; UG/1
1 POWDER RESPIRATORY (INHALATION) DAILY
Qty: 2 EACH | Refills: 0 | COMMUNITY
Start: 2023-03-16

## 2023-03-21 NOTE — TELEPHONE ENCOUNTER
Patient is requesting medication refill.  Please approve or deny this request.    Rx requested:  Requested Prescriptions     Pending Prescriptions Disp Refills    metFORMIN (GLUCOPHAGE) 500 MG tablet [Pharmacy Med Name: metformin 500 mg tablet] 180 tablet 3     Sig: TAKE 2 TABLETS BY MOUTH DAILY WITH SUPPER         Last Office Visit:   12/16/2022      Next Visit Date:  Future Appointments   Date Time Provider Preet [place]   6/16/2023 11:30 AM Erin Willingham MD [facility]   8/8/2023  1:15 PM Imelda Li MD [facility]

## 2023-05-08 RX ORDER — SPIRONOLACTONE 25 MG/1
25 TABLET ORAL DAILY
Qty: 30 TABLET | Status: CANCELLED | OUTPATIENT
Start: 2023-05-08

## 2023-05-09 ENCOUNTER — TELEPHONE (OUTPATIENT)
Dept: PRIMARY CARE CLINIC | Age: 68
End: 2023-05-09

## 2023-05-09 ENCOUNTER — OFFICE VISIT (OUTPATIENT)
Dept: PRIMARY CARE CLINIC | Age: 68
End: 2023-05-09
Payer: COMMERCIAL

## 2023-05-09 VITALS
DIASTOLIC BLOOD PRESSURE: 66 MMHG | HEIGHT: 66 IN | BODY MASS INDEX: 47.09 KG/M2 | HEART RATE: 95 BPM | RESPIRATION RATE: 18 BRPM | TEMPERATURE: 97.1 F | SYSTOLIC BLOOD PRESSURE: 124 MMHG | OXYGEN SATURATION: 95 % | WEIGHT: 293 LBS

## 2023-05-09 DIAGNOSIS — M79.609 PAIN IN EXTREMITY, UNSPECIFIED EXTREMITY: ICD-10-CM

## 2023-05-09 DIAGNOSIS — F33.0 MILD EPISODE OF RECURRENT MAJOR DEPRESSIVE DISORDER (HCC): Primary | ICD-10-CM

## 2023-05-09 DIAGNOSIS — Z12.31 ENCOUNTER FOR SCREENING MAMMOGRAM FOR MALIGNANT NEOPLASM OF BREAST: ICD-10-CM

## 2023-05-09 DIAGNOSIS — E11.9 TYPE 2 DIABETES MELLITUS WITHOUT COMPLICATION, WITHOUT LONG-TERM CURRENT USE OF INSULIN (HCC): ICD-10-CM

## 2023-05-09 LAB
CREAT UR-MCNC: 203.7 MG/DL
MICROALBUMIN UR-MCNC: 8.8 MG/DL
MICROALBUMIN/CREAT UR-RTO: 43.2 MG/G (ref 0–30)

## 2023-05-09 PROCEDURE — 1123F ACP DISCUSS/DSCN MKR DOCD: CPT | Performed by: INTERNAL MEDICINE

## 2023-05-09 PROCEDURE — 99214 OFFICE O/P EST MOD 30 MIN: CPT | Performed by: INTERNAL MEDICINE

## 2023-05-09 PROCEDURE — 3051F HG A1C>EQUAL 7.0%<8.0%: CPT | Performed by: INTERNAL MEDICINE

## 2023-05-09 RX ORDER — SPIRONOLACTONE 25 MG/1
25 TABLET ORAL DAILY
Qty: 90 TABLET | Refills: 3 | Status: SHIPPED | OUTPATIENT
Start: 2023-05-09

## 2023-05-09 RX ORDER — PROCHLORPERAZINE 25 MG/1
SUPPOSITORY RECTAL
Qty: 4 EACH | Refills: 10 | Status: SHIPPED | OUTPATIENT
Start: 2023-05-09 | End: 2023-05-09

## 2023-05-09 RX ORDER — BLOOD-GLUCOSE,RECEIVER,CONT
EACH MISCELLANEOUS
Qty: 2 EACH | Refills: 5 | Status: SHIPPED | OUTPATIENT
Start: 2023-05-09 | End: 2023-05-09

## 2023-05-09 SDOH — ECONOMIC STABILITY: INCOME INSECURITY: HOW HARD IS IT FOR YOU TO PAY FOR THE VERY BASICS LIKE FOOD, HOUSING, MEDICAL CARE, AND HEATING?: NOT HARD AT ALL

## 2023-05-09 SDOH — ECONOMIC STABILITY: FOOD INSECURITY: WITHIN THE PAST 12 MONTHS, THE FOOD YOU BOUGHT JUST DIDN'T LAST AND YOU DIDN'T HAVE MONEY TO GET MORE.: NEVER TRUE

## 2023-05-09 SDOH — ECONOMIC STABILITY: HOUSING INSECURITY
IN THE LAST 12 MONTHS, WAS THERE A TIME WHEN YOU DID NOT HAVE A STEADY PLACE TO SLEEP OR SLEPT IN A SHELTER (INCLUDING NOW)?: NO

## 2023-05-09 SDOH — ECONOMIC STABILITY: FOOD INSECURITY: WITHIN THE PAST 12 MONTHS, YOU WORRIED THAT YOUR FOOD WOULD RUN OUT BEFORE YOU GOT MONEY TO BUY MORE.: NEVER TRUE

## 2023-05-09 ASSESSMENT — PATIENT HEALTH QUESTIONNAIRE - PHQ9
8. MOVING OR SPEAKING SO SLOWLY THAT OTHER PEOPLE COULD HAVE NOTICED. OR THE OPPOSITE, BEING SO FIGETY OR RESTLESS THAT YOU HAVE BEEN MOVING AROUND A LOT MORE THAN USUAL: 2
4. FEELING TIRED OR HAVING LITTLE ENERGY: 2
SUM OF ALL RESPONSES TO PHQ QUESTIONS 1-9: 12
SUM OF ALL RESPONSES TO PHQ QUESTIONS 1-9: 13
SUM OF ALL RESPONSES TO PHQ QUESTIONS 1-9: 13
2. FEELING DOWN, DEPRESSED OR HOPELESS: 2
9. THOUGHTS THAT YOU WOULD BE BETTER OFF DEAD, OR OF HURTING YOURSELF: 1
6. FEELING BAD ABOUT YOURSELF - OR THAT YOU ARE A FAILURE OR HAVE LET YOURSELF OR YOUR FAMILY DOWN: 1
7. TROUBLE CONCENTRATING ON THINGS, SUCH AS READING THE NEWSPAPER OR WATCHING TELEVISION: 3
10. IF YOU CHECKED OFF ANY PROBLEMS, HOW DIFFICULT HAVE THESE PROBLEMS MADE IT FOR YOU TO DO YOUR WORK, TAKE CARE OF THINGS AT HOME, OR GET ALONG WITH OTHER PEOPLE: 2
1. LITTLE INTEREST OR PLEASURE IN DOING THINGS: 2
3. TROUBLE FALLING OR STAYING ASLEEP: 0
5. POOR APPETITE OR OVEREATING: 0
SUM OF ALL RESPONSES TO PHQ QUESTIONS 1-9: 13
SUM OF ALL RESPONSES TO PHQ9 QUESTIONS 1 & 2: 4

## 2023-05-09 ASSESSMENT — ENCOUNTER SYMPTOMS
VOMITING: 0
SHORTNESS OF BREATH: 0
DIARRHEA: 0
COUGH: 0
ABDOMINAL PAIN: 0
WHEEZING: 0
NAUSEA: 0

## 2023-05-09 ASSESSMENT — COLUMBIA-SUICIDE SEVERITY RATING SCALE - C-SSRS
2. HAVE YOU ACTUALLY HAD ANY THOUGHTS OF KILLING YOURSELF?: NO
1. WITHIN THE PAST MONTH, HAVE YOU WISHED YOU WERE DEAD OR WISHED YOU COULD GO TO SLEEP AND NOT WAKE UP?: YES
6. HAVE YOU EVER DONE ANYTHING, STARTED TO DO ANYTHING, OR PREPARED TO DO ANYTHING TO END YOUR LIFE?: NO

## 2023-05-09 NOTE — TELEPHONE ENCOUNTER
Lisbet Swenson from [facility] is calling regarding the dexcom. The two prescriptions don't match. She needs to know if you want the dexcom 6 or 7? Her # is [phone].

## 2023-05-09 NOTE — TELEPHONE ENCOUNTER
Actually pls tell them to disregard and cancel the order on their end.  Pt has no smart phone to use for the reader

## 2023-05-09 NOTE — PROGRESS NOTES
sounds: Normal breath sounds. No wheezing or rales. Chest:      Chest wall: No tenderness. Abdominal:      General: Bowel sounds are normal.      Tenderness: There is no abdominal tenderness. Musculoskeletal:      Comments: B/l arm symmetry,  left arm TTP, no erythema or swelling  Elbow ROM full in flexion, extension, inversion and eversion    B/l hip:   No erythema or swelling, no TTP   Hip ROM full in flexion, extension, internal and external rotations     No swelling, erythema or rash, no TTP  ROM normal in flexion and extension   Anterior and posterior drawer negative, varus and valgus negative. Madiha and Lachman negative   Neurological:      Mental Status: She is alert. Assessment:       Diagnosis Orders   1. Mild episode of recurrent major depressive disorder ([facility] [address] Inadequately Controlled Ambulatory referral to Psychology    will connect with care coordinator for community social activities       2. Type 2 diabetes mellitus without complication, without long-term current use of insulin (HCC) Inadequately Controlled Microalbumin / Creatinine Urine Ratio    DISCONTINUED: Continuous Blood Gluc  (DEXCOM G7 ) EVE    DISCONTINUED: Continuous Blood Gluc Sensor (DEXCOM G6 SENSOR) MISC    will add Jardiance   BG uncontrolled       3. Pain in extremity, unspecified extremity  Desirae Ferro MD, Sports Medicine, ESPOO      4.  Encounter for screening mammogram for malignant neoplasm of breast  LIA CAMRON DIGITAL SCREEN BILATERAL        Plan:      Orders Placed This Encounter   Procedures    LIA CAMRON DIGITAL SCREEN BILATERAL     Standing Status:   Future     Standing Expiration Date:   7/9/2024    Microalbumin / Creatinine Urine Ratio     Standing Status:   Future     Number of Occurrences:   1     Standing Expiration Date:   5/9/2024    Ambulatory referral to Psychology     Referral Priority:   Routine     Referral Type:   Eval and Treat     Referral Reason:   Specialty Services

## 2023-05-11 ENCOUNTER — CARE COORDINATION (OUTPATIENT)
Dept: CARE COORDINATION | Age: 68
End: 2023-05-11

## 2023-05-11 ENCOUNTER — INITIAL CONSULT (OUTPATIENT)
Dept: SPORTS MEDICINE | Age: 68
End: 2023-05-11
Payer: COMMERCIAL

## 2023-05-11 VITALS — WEIGHT: 293 LBS | HEART RATE: 90 BPM | HEIGHT: 66 IN | OXYGEN SATURATION: 97 % | BODY MASS INDEX: 47.09 KG/M2

## 2023-05-11 DIAGNOSIS — M47.22 OSTEOARTHRITIS OF SPINE WITH RADICULOPATHY, CERVICAL REGION: Primary | ICD-10-CM

## 2023-05-11 DIAGNOSIS — M47.26 OSTEOARTHRITIS OF SPINE WITH RADICULOPATHY, LUMBAR REGION: ICD-10-CM

## 2023-05-11 PROCEDURE — 1123F ACP DISCUSS/DSCN MKR DOCD: CPT | Performed by: FAMILY MEDICINE

## 2023-05-11 PROCEDURE — 99204 OFFICE O/P NEW MOD 45 MIN: CPT | Performed by: FAMILY MEDICINE

## 2023-05-11 ASSESSMENT — ENCOUNTER SYMPTOMS
NAUSEA: 0
CONSTIPATION: 0
BACK PAIN: 0
DIARRHEA: 0
SHORTNESS OF BREATH: 0

## 2023-05-11 NOTE — CARE COORDINATION
ACM called patient left message requesting a return call for [facility] out reach. Contact information supplied. Letter sent via [address] mail into [facility].

## 2023-05-11 NOTE — PROGRESS NOTES
facility-administered medications on file prior to visit. Review of Systems   Constitutional:  Negative for fever. HENT:  Negative for hearing loss. Respiratory:  Negative for shortness of breath. Gastrointestinal:  Negative for constipation, diarrhea and nausea. Genitourinary:  Negative for difficulty urinating. Musculoskeletal:  Negative for back pain and neck pain. Skin:  Negative for rash. Neurological:  Negative for headaches. Hematological:  Does not bruise/bleed easily. Psychiatric/Behavioral:  Negative for sleep disturbance. Objective:     Vitals:  Pulse 90   Ht 5' 6\" (1.676 m)   Wt (!) 329 lb (149.2 kg)   LMP  (LMP Unknown)   SpO2 97%   BMI 53.10 kg/m² Pain Score:   0 - No pain      Physical Exam  Constitutional:       Appearance: Normal appearance. HENT:      Head: Normocephalic. Nose: No rhinorrhea. Mouth/Throat:      Pharynx: Oropharynx is clear. Eyes:      Pupils: Pupils are equal, round, and reactive to light. Cardiovascular:      Rate and Rhythm: Normal rate. Pulses: Normal pulses. Pulmonary:      Breath sounds: No wheezing. Abdominal:      Palpations: Abdomen is soft. Musculoskeletal:         General: No deformity. Cervical back: No rigidity. Lymphadenopathy:      Cervical: No cervical adenopathy. Skin:     General: Skin is warm and dry. Findings: No rash. Neurological:      Mental Status: She is alert and oriented to person, place, and time.    Psychiatric:         Mood and Affect: Mood normal.         Behavior: Behavior normal.       Ortho Exam   Examination of the cervical spine revealed the neurovascular muscle status to be intact patient had some limited rotation of only about 70 degrees near full flexion and extension equivocal Spurling sign lateralizing to the left palpable tenderness in the lower left paracervicals  Examination of the lumbar spine revealed the neurovascular status to be intact positive tension

## 2023-05-12 ENCOUNTER — CARE COORDINATION (OUTPATIENT)
Dept: CARE COORDINATION | Age: 68
End: 2023-05-12

## 2023-05-12 NOTE — CARE COORDINATION
Case referred to this writer by KRISH Hackett to assist patient with community resources and transportation. Telephone call to [facility] to discuss transportation benefits. Patient has unlimited one way rides to PCP office and 24 other plan approved in network providers. Patient would need to call [phone] 48 hours in advance to schedule transportation.

## 2023-05-12 NOTE — CARE COORDINATION
Initial Contact Social Work Note - Ambulatory  5/12/2023      Date of referral: 5/11/2023  Referral received from: KRISH Lopez  Reason for referral: Transportation and community resources. Previous SW referral: No  If yes, brief summary of outcome:     Two Identifiers Verified: Yes    Insurance Provider: [address] [place] [address] [address]: Adult Child/Children    [place] Status:  Not [employer] MXP4 Providers:  None    ADL Assistance Needed:  Independent    Housing/Living Concerns or Home Modification Needs:  Lives independently. Transportation Concern: Patient is still driving. No transportation needs. Medication Cost Concern:  No problems affording medications at this time. Medication Adherence Concern:   Financial Concern(s): Income (only if applicable):     Ability to Read/Write: Yes    Advance Care Plan:   Not discussed    Other: Patient is feeling lonely and is looking for social outlets at this time.     Identified Needs:  Social Outlets        Social Work Plan:  Provided patient with contact information for [employer] on [employer] Jewell and [employer] and "[employer]"ation Department    Next Steps: Provided patient with contact information for Nain Spencer and [employer] Department  Method of Communication With Provider (if appropriate): None       Goals Addressed    None

## 2023-05-15 ENCOUNTER — CARE COORDINATION (OUTPATIENT)
Dept: CARE COORDINATION | Age: 68
End: 2023-05-15

## 2023-05-15 RX ORDER — ASCORBIC ACID 1000 MG
TABLET, EXTENDED RELEASE ORAL
COMMUNITY

## 2023-05-15 NOTE — CARE COORDINATION
ACM spoke to patient. Introduced [facility] program role of ACM goals and benefits. Patient was advised referral was provided by PCP. Patient feels she manages very well her daughter lives across the street. She is aware of her chronic conditions of diabetes COPD and CHF and kidney disease she follows up with all of her providers. Patient did allow ACM to review her medications. Patient also states she did receive a call from care coordination LSW. ACM will not sign on for care coordination as patient feels she has no care coordination needs at this time. Patient encouraged to keep contact information if her needs change.

## 2023-05-18 DIAGNOSIS — E11.9 TYPE 2 DIABETES MELLITUS WITHOUT COMPLICATION, WITHOUT LONG-TERM CURRENT USE OF INSULIN (HCC): ICD-10-CM

## 2023-05-19 RX ORDER — LANCETS 30 GAUGE
EACH MISCELLANEOUS
Qty: 200 EACH | Refills: 10 | Status: SHIPPED | OUTPATIENT
Start: 2023-05-19

## 2023-05-22 ENCOUNTER — CARE COORDINATION (OUTPATIENT)
Dept: CARE COORDINATION | Age: 68
End: 2023-05-22

## 2023-05-22 NOTE — CARE COORDINATION
Telephone call with patient. She stated that she has not called the resources for Senior Programs yet as daughter was in the hospital.  She is going for outpatient physical therapy . She is interested in some of the programs with [address] [facility].

## 2023-05-24 ENCOUNTER — HOSPITAL ENCOUNTER (OUTPATIENT)
Dept: PHYSICAL THERAPY | Age: 68
Setting detail: THERAPIES SERIES
Discharge: HOME | End: 2023-05-24
Payer: COMMERCIAL

## 2023-05-24 PROCEDURE — 97162 PT EVAL MOD COMPLEX 30 MIN: CPT

## 2023-05-24 ASSESSMENT — PAIN SCALES - GENERAL: PAINLEVEL_OUTOF10: 2

## 2023-05-24 ASSESSMENT — PAIN DESCRIPTION - LOCATION: LOCATION: BACK;NECK

## 2023-05-24 ASSESSMENT — PAIN DESCRIPTION - DESCRIPTORS: DESCRIPTORS: ACHING

## 2023-05-24 NOTE — PROGRESS NOTES
[address] [address]   [address]      [phone]    [x] Certification  [] Recertification [x]  Plan of Care  [] Progress Note [] Discharge      Referring Provider: Paul García DO     From:  Trevor Adames, PT    Patient: Celena Leo (29 y.o. 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 Plan of Care/Certification Expiration Date: 23   Progress Report Period from:  2023  to 2023    Visits to Date: 1 No Show: 0 Cancelled Appts: 0    OBJECTIVE:   Short Term Goals - Time Frame for Short Term Goals: 2-3 weeks    Goals Current/Discharge status  Status   Short Term Goal 1: The pt will demonstrate improved postural awareness requiring <25% VC's with exercises  General Observations  Description: Fwd head position, rounded shoulders, flexed trunk, kyphosis, dowager hump  STG Goal 1 Status[de-identified] New   Short Term Goal 2: Decrease Cervical and low back pain 50% to assist with improved functional gains. Pain Screening  Patient Currently in Pain: Yes  Pain Assessment: 0-10  Pain Level: 2 (0/10 neck pain, back 2/10)  Best Pain Level: 0  Worst Pain Level: 7 (neck and back)  Pain Location: Back, Neck  Pain Descriptors: Aching   STG Goal 2 Status[de-identified] New   Long Term Goals - Time Frame for Long Term Goals : 4-8 weeks  Goals Current/ Discharge status Met   Long Term Goal 1: Indep HEP for symptom management Need Written HEP initiated  for symptom management  Needs progression for comprehensive program development.  LTG Goal 1 Status[de-identified] New   Long Term Goal 2: Pt demo improved overall function by reporting greater than 80-95% per NDI and Mod Oswestry functional survey score Exam: NDI
Falls [x] Yes  [] No 25  0 25   Secondary Diagnosis [] Yes  [x] No 15  0 0   Ambulatory Aid [] Furniture  [x] Crutches/cane/walker  [] None/bedrest/wheelchair/nurse 30  15  0 15   IV/Heparin Lock [] Yes  [x] No 20  0 0   Gait/Transferring [] Impaired  [x] Weak  [] Normal/bedrest/immobile 20  10  0 10   Mental Status [] Forgets limitations  [x] Oriented to own ability 15  0 0      Total:50     Based on the Assessment score: check the appropriate box.   []  No intervention needed   Low =   Score of 0-24  [x]  Use standard prevention interventions Moderate =  Score of 24-44   [x] Discuss fall prevention strategies   [x] Indicate moderate falls risk on eval  []  Use high risk prevention interventions High = Score of 45 and higher   [] Discuss fall prevention strategies   [] Provide supervision during treatment time      Minutes:  PT Individual Minutes  Time In: 1018  Time Out: 1055  Minutes: 37     Procedure Minutes:37 min     Electronically signed by Presley Alfonso PT on 5/24/23 at 2:32 PM EDT

## 2023-05-31 ENCOUNTER — HOSPITAL ENCOUNTER (OUTPATIENT)
Dept: PHYSICAL THERAPY | Age: 68
Setting detail: THERAPIES SERIES
Discharge: HOME | End: 2023-05-31
Payer: COMMERCIAL

## 2023-05-31 ENCOUNTER — CARE COORDINATION (OUTPATIENT)
Dept: CARE COORDINATION | Age: 68
End: 2023-05-31

## 2023-05-31 PROCEDURE — 97110 THERAPEUTIC EXERCISES: CPT

## 2023-05-31 PROCEDURE — 97140 MANUAL THERAPY 1/> REGIONS: CPT

## 2023-05-31 PROCEDURE — G0283 ELEC STIM OTHER THAN WOUND: HCPCS

## 2023-05-31 ASSESSMENT — PAIN DESCRIPTION - DESCRIPTORS: DESCRIPTORS: ACHING

## 2023-05-31 ASSESSMENT — PAIN DESCRIPTION - LOCATION: LOCATION: BACK;NECK

## 2023-05-31 ASSESSMENT — PAIN SCALES - GENERAL: PAINLEVEL_OUTOF10: 3

## 2023-05-31 NOTE — CARE COORDINATION
Telephone call with patient. Provided patient with  contact  information on [address] [place] [address] on Aging.

## 2023-05-31 NOTE — PROGRESS NOTES
[address] [place]   [place]  VUOIV:[phone]      Physical TherapyTreatment Note        Date: 2023  Patient: Lelia Juarez  : 1955   Confirmed: Yes  MRN: [id number]  Referring Provider: Sandra Mcbride DO      Medical Diagnosis: Osteoarthritis of spine with radiculopathy, cervical region [M47.22]  Osteoarthritis of spine with radiculopathy, lumbar region [M47.26]      Treatment Diagnosis: Cervical and LBP with strength and ROM deficits affecting overall functional tolerance. Visit Information:  Insurance: Payor: [facility] HEALTH PLAN / Plan:  [address] / Product Type: *No Product type* /   PT Visit Information  Onset Date: 23  PT Insurance Information: [place]  Total # of Visits to Date: 2  Plan of Care/Certification Expiration Date: 23  No Show: 0  Progress Note Due Date: 23  Canceled Appointment: 0  Progress Note Counter:  (PN due 23)    Subjective Information:  Subjective: Pt reported there was one day in the past week where she was not able to move much or leave the house. Her son and daughter in law opened their pool and she will use the pool for some water therapy.   HEP Compliance:  [] Good [] Fair [] Poor [x] Reports not doing due to: initiated this date    Pain Screening  Patient Currently in Pain: Yes  Pain Assessment: 0-10  Pain Level: 3 (2/10 back 0/10 neck)  Pain Location: Back, Neck  Pain Descriptors: Aching    Treatment:  Exercises:  Exercises  Exercise 1: UT stretch// levator 30\" x3 ea  Exercise 2: chin tuck x10  Exercise 3: shoulder shrugs x10  scap retraction x10  Exercise 4: hamstring stretch seated 30\" x3  Exercise 5: hip add with ball x10 , hip abd RTB x10  Exercise 6: seated march x10  Exercise 7: standing sink ex: hip ext, hip ABD, HS curls, mini squat x10  Exercise 8: HEP: UT stretch, levator stretch, scap retraction, chin tucks, shoulder shrugs  Treatment Reasoning  Limitations

## 2023-06-02 ENCOUNTER — HOSPITAL ENCOUNTER (OUTPATIENT)
Dept: PHYSICAL THERAPY | Age: 68
Setting detail: THERAPIES SERIES
Discharge: HOME | End: 2023-06-02
Payer: COMMERCIAL

## 2023-06-02 PROCEDURE — G0283 ELEC STIM OTHER THAN WOUND: HCPCS

## 2023-06-02 PROCEDURE — 97140 MANUAL THERAPY 1/> REGIONS: CPT

## 2023-06-02 PROCEDURE — 97110 THERAPEUTIC EXERCISES: CPT

## 2023-06-02 ASSESSMENT — PAIN SCALES - GENERAL: PAINLEVEL_OUTOF10: 1

## 2023-06-02 ASSESSMENT — PAIN DESCRIPTION - DESCRIPTORS: DESCRIPTORS: ACHING

## 2023-06-02 ASSESSMENT — PAIN DESCRIPTION - LOCATION: LOCATION: BACK;NECK

## 2023-06-02 NOTE — PROGRESS NOTES
Therapy                            Cancellation/No-show Note      Date: 2023  Patient: Nakul Thompson (62 y.o. female)  : 1955  MRN:  [id number]  Referring Physician: Akira Morales DO    Medical Diagnosis: Osteoarthritis of spine with radiculopathy, cervical region [M47.22]  Osteoarthritis of spine with radiculopathy, lumbar region [M47.26]      Visit Information:  Visits to Date 2   No Show/Cancelled Appts: 0       For today's appointment patient:  [x]  Cancelled  []  Rescheduled appointment  []  No-show   []  Called pt to remind of next appointment     Reason given by patient:  []  Patient ill  []  Conflicting appointment  []  No transportation    []  Conflict with work  []  No reason given  [x]  Other:  Sick child at home     [] Pt has future appointments scheduled, no follow up needed  [] Pt requests to be on hold.     Reason:   If > 2 weeks please discuss with therapist.  [] Therapist to call pt for follow up     Comments:       Signature: Electronically signed by Casandra Castellanos PTA on 23 at 9:37 AM EDT

## 2023-06-02 NOTE — PROGRESS NOTES
[address] [facility] [address] [facility] [facility] [address] [place]   [place]  [phone]      Physical TherapyTreatment Note        Date: 2023  Patient: Jaz Mcdaniels  : 1955   Confirmed: Yes  MRN: [id number]  Referring Provider: Nancy Lindsay DO      Medical Diagnosis: Osteoarthritis of spine with radiculopathy, cervical region [M47.22]  Osteoarthritis of spine with radiculopathy, lumbar region [M47.26]      Treatment Diagnosis: Cervical and LBP with strength and ROM deficits affecting overall functional tolerance. Visit Information:  Insurance: Payor: [facility] HEALTH PLAN / Plan:  [address] / Product Type: *No Product type* /   PT Visit Information  Onset Date: 23  PT Insurance Information: [place]  Total # of Visits to Date: 3  Plan of Care/Certification Expiration Date: 23  No Show: 0  Progress Note Due Date: 23  Canceled Appointment: 1  Progress Note Counter: 3/8 (PN due 23)    Subjective Information:  Subjective: PT reported she felt amazing after therapy last session. She has been completing HEP daily and been swimming that past two days with no pain. HEP Compliance:  [x] Good [] Fair [] Poor [] Reports not doing due to:    Pain Screening  Patient Currently in Pain: Yes  Pain Level: 1 (1/10 LB 0/10 neck)  Pain Location: Back, Neck  Pain Descriptors: Aching    Treatment:  Exercises:  Exercises  Exercise 1: UT stretch// levator 30\" x3 ea  Exercise 2: chin tuck x10  Exercise 3: shoulder shrugs x10  scap retraction x10  Exercise 4: hamstring stretch seated with stool  30\" x3  Exercise 5: hip add with ball x10 , hip abd GTB x10  Exercise 6: Row BTB x10/ Lat pull GTB x10  Exercise 7: standing sink ex: hip ext, hip ABD, HS curls, marching, mini squat x10  Exercise 8: HEP: UT stretch, levator stretch, scap retraction, chin tucks, shoulder shrugs  Treatment Reasoning  Limitations addressed:  Mobility, Strength, Flexibility    Manual:   Manual Therapy  Soft

## 2023-06-05 ENCOUNTER — HOSPITAL ENCOUNTER (OUTPATIENT)
Dept: WOMENS IMAGING | Age: 68
Discharge: HOME | End: 2023-06-07
Payer: COMMERCIAL

## 2023-06-05 VITALS — BODY MASS INDEX: 51.53 KG/M2 | HEIGHT: 67 IN

## 2023-06-05 DIAGNOSIS — Z12.31 ENCOUNTER FOR SCREENING MAMMOGRAM FOR MALIGNANT NEOPLASM OF BREAST: ICD-10-CM

## 2023-06-05 PROCEDURE — 77063 BREAST TOMOSYNTHESIS BI: CPT

## 2023-06-06 ENCOUNTER — HOSPITAL ENCOUNTER (OUTPATIENT)
Dept: PHYSICAL THERAPY | Age: 68
Setting detail: THERAPIES SERIES
Discharge: HOME | End: 2023-06-06
Payer: COMMERCIAL

## 2023-06-06 PROCEDURE — 97110 THERAPEUTIC EXERCISES: CPT

## 2023-06-06 PROCEDURE — G0283 ELEC STIM OTHER THAN WOUND: HCPCS

## 2023-06-06 ASSESSMENT — PAIN DESCRIPTION - LOCATION: LOCATION: BACK

## 2023-06-06 ASSESSMENT — PAIN DESCRIPTION - DESCRIPTORS: DESCRIPTORS: ACHING

## 2023-06-06 NOTE — PROGRESS NOTES
assessment: Intact  Limitations addressed: Pain modulation       *Indicates exercise, modality, or manual techniques to be initiated when appropriate    Objective Measures:       STG 1 Current Status[de-identified] 6/2/23 25-50% VC's with exercises for postural awareness  STG 2 Current Status[de-identified] Pt reports 80% better in neck and back. LTG 3 Current Status[de-identified] cervical ROM flexion 24 extension 34 L sb 23 R 25      HEP GTB BTB hip abduction rows and lats       Assessment: Body Structures, Functions, Activity Limitations Requiring Skilled Therapeutic Intervention: Decreased functional mobility , Decreased ROM, Decreased strength, Decreased balance, Decreased posture, Increased pain  Assessment: Pt with sig decrease in everyday pain. Pt with cont difficulty engaging core with seated exercises for up right posture. Pt with a slight increase in cervical ROM this date. Pt applied for financial assistance to cont therapy will call to schedule more apt when she hears back. Post-Pain Assessment:       Pain Rating (0-10 pain scale): 0  /10   Location and pain description same as pre-treatment unless indicated. Action: [] NA   [x] Perform HEP  [] Meds as prescribed  [] Modalities as prescribed   [] Call Physician     GOALS   Patient Goal(s): Patient Goals : Decrease pain    Short Term Goals Completed by 2-3 weeks Goal Status   STG 1 The pt will demonstrate improved postural awareness requiring <25% VC's with exercises In progress   STG 2 Decrease Cervical and low back pain 50% to assist with improved functional gains. Met     Long Term Goals Completed by 4-8 weeks Goal Status   LTG 1 Indep HEP for symptom management In progress   LTG 2 Pt demo improved overall function by reporting greater than 80-95% per NDI and Mod Oswestry functional survey score In progress   LTG 3 Pain-free Lumbar AROM 50-75% and Cervical AROM to increase 5-10 deg allowing an increase in ADL tolerance.  In progress, Partially met   LTG 4 Improve core strength

## 2023-06-07 ENCOUNTER — CARE COORDINATION (OUTPATIENT)
Dept: CARE COORDINATION | Age: 68
End: 2023-06-07

## 2023-06-08 ENCOUNTER — OFFICE VISIT (OUTPATIENT)
Dept: SPORTS MEDICINE | Age: 68
End: 2023-06-08
Payer: COMMERCIAL

## 2023-06-08 VITALS
WEIGHT: 293 LBS | DIASTOLIC BLOOD PRESSURE: 64 MMHG | HEIGHT: 67 IN | SYSTOLIC BLOOD PRESSURE: 108 MMHG | TEMPERATURE: 97.8 F | BODY MASS INDEX: 45.99 KG/M2

## 2023-06-08 DIAGNOSIS — M47.26 OSTEOARTHRITIS OF SPINE WITH RADICULOPATHY, LUMBAR REGION: ICD-10-CM

## 2023-06-08 DIAGNOSIS — M47.22 OSTEOARTHRITIS OF SPINE WITH RADICULOPATHY, CERVICAL REGION: Primary | ICD-10-CM

## 2023-06-08 PROCEDURE — 1123F ACP DISCUSS/DSCN MKR DOCD: CPT | Performed by: FAMILY MEDICINE

## 2023-06-08 PROCEDURE — 99213 OFFICE O/P EST LOW 20 MIN: CPT | Performed by: FAMILY MEDICINE

## 2023-06-08 ASSESSMENT — ENCOUNTER SYMPTOMS
DIARRHEA: 0
BACK PAIN: 0
SHORTNESS OF BREATH: 0
NAUSEA: 0
CONSTIPATION: 0

## 2023-06-08 NOTE — PROGRESS NOTES
[facility]) Physicians  Neurosurgery and Pain [facility]  [address] [facility] [facility] [address] [facility] [facility] [address] [facility] , [address] [place], [place]  P: [phone]  F: [phone]      Antonietta Jerome  (1955)    2023    Subjective:     Antonietta Jerome is 77 y.o. female who complains today of:    Chief Complaint   Patient presents with    Follow-up    Neck Injury    Back Injury       HPI     This patient returns stating that she is doing much better both in the neck and back since the exercises are really helping her  Allergies:  Budesonide-formoterol fumarate    Past Medical History:   Diagnosis Date    Asthma     COPD (chronic obstructive pulmonary disease) ([facility] [address]     Depression     Diabetes mellitus ([facility] [address]     Sleep apnea      Past Surgical History:   Procedure Laterality Date    CHOLECYSTECTOMY      COLONOSCOPY N/A 2021    COLORECTAL CANCER SCREENING with polypectomies performed by Meron Moss MD at [facility]     Family History   Problem Relation Age of Onset    Breast Cancer Mother     Colon Cancer Neg Hx      Social History     Socioeconomic History    Marital status:      Spouse name: Not on file    Number of children: Not on file    Years of education: Not on file    Highest education level: Not on file   Occupational History    Not on file   Tobacco Use    Smoking status: Former     Packs/day: 1.00     Years: 40.00     Pack years: 40.00     Types: Cigarettes     Start date:      Quit date:      Years since quittin.4    Smokeless tobacco: Former     Quit date:    Vaping Use    Vaping Use: Never used   Substance and Sexual Activity    Alcohol use: Not Currently     Comment: occass    Drug use: Never    Sexual activity: Not on file   Other Topics Concern    Not on file   Social History Narrative    Not on file     Social Determinants of Health     Financial Resource Strain: Low Risk     Difficulty of Paying Living Expenses: Not hard at all   Food Insecurity: No Food

## 2023-06-21 DIAGNOSIS — R41.3 MEMORY LOSS: Primary | ICD-10-CM

## 2023-06-23 ENCOUNTER — TELEPHONE (OUTPATIENT)
Dept: PRIMARY CARE CLINIC | Age: 68
End: 2023-06-23

## 2023-06-23 DIAGNOSIS — R41.3 MEMORY LOSS: ICD-10-CM

## 2023-06-23 DIAGNOSIS — F33.0 MILD EPISODE OF RECURRENT MAJOR DEPRESSIVE DISORDER (HCC): Primary | ICD-10-CM

## 2023-07-10 DIAGNOSIS — F33.0 MILD EPISODE OF RECURRENT MAJOR DEPRESSIVE DISORDER (HCC): ICD-10-CM

## 2023-07-11 DIAGNOSIS — E11.9 TYPE 2 DIABETES MELLITUS WITHOUT COMPLICATION, WITHOUT LONG-TERM CURRENT USE OF INSULIN (HCC): ICD-10-CM

## 2023-07-11 LAB
CHOLEST SERPL-MCNC: 104 MG/DL (ref 0–199)
HBA1C MFR BLD: 7.5 % (ref 4.8–5.9)
HDLC SERPL-MCNC: 37 MG/DL (ref 40–59)
LDL CHOLESTEROL CALCULATED: 47 MG/DL (ref 0–129)
TRIGLYCERIDE, FASTING: 99 MG/DL (ref 0–150)

## 2023-07-11 RX ORDER — DULOXETIN HYDROCHLORIDE 30 MG/1
90 CAPSULE, DELAYED RELEASE ORAL DAILY
Qty: 180 CAPSULE | Refills: 3 | Status: SHIPPED | OUTPATIENT
Start: 2023-07-11

## 2023-07-28 DIAGNOSIS — E11.9 TYPE 2 DIABETES MELLITUS WITHOUT COMPLICATION, WITHOUT LONG-TERM CURRENT USE OF INSULIN (HCC): ICD-10-CM

## 2023-07-28 RX ORDER — SEMAGLUTIDE 1.34 MG/ML
1 INJECTION, SOLUTION SUBCUTANEOUS WEEKLY
Qty: 3 ML | Refills: 5 | Status: SHIPPED | OUTPATIENT
Start: 2023-07-28 | End: 2023-09-20

## 2023-08-08 ENCOUNTER — OFFICE VISIT (OUTPATIENT)
Dept: PULMONOLOGY | Age: 68
End: 2023-08-08
Payer: COMMERCIAL

## 2023-08-08 VITALS
BODY MASS INDEX: 47.09 KG/M2 | WEIGHT: 293 LBS | SYSTOLIC BLOOD PRESSURE: 118 MMHG | TEMPERATURE: 97.2 F | RESPIRATION RATE: 16 BRPM | DIASTOLIC BLOOD PRESSURE: 72 MMHG | HEIGHT: 66 IN | OXYGEN SATURATION: 97 % | HEART RATE: 94 BPM

## 2023-08-08 DIAGNOSIS — I51.89 DIASTOLIC DYSFUNCTION: ICD-10-CM

## 2023-08-08 DIAGNOSIS — G47.34 NOCTURNAL HYPOXIA: ICD-10-CM

## 2023-08-08 DIAGNOSIS — J44.9 ASTHMA-COPD OVERLAP SYNDROME (HCC): ICD-10-CM

## 2023-08-08 DIAGNOSIS — E66.01 CLASS 3 SEVERE OBESITY DUE TO EXCESS CALORIES WITH SERIOUS COMORBIDITY AND BODY MASS INDEX (BMI) OF 50.0 TO 59.9 IN ADULT (HCC): ICD-10-CM

## 2023-08-08 DIAGNOSIS — Z87.891 HISTORY OF SMOKING: ICD-10-CM

## 2023-08-08 DIAGNOSIS — G47.33 OSA (OBSTRUCTIVE SLEEP APNEA): Primary | ICD-10-CM

## 2023-08-08 PROCEDURE — 99213 OFFICE O/P EST LOW 20 MIN: CPT | Performed by: INTERNAL MEDICINE

## 2023-08-08 PROCEDURE — 1123F ACP DISCUSS/DSCN MKR DOCD: CPT | Performed by: INTERNAL MEDICINE

## 2023-08-09 ENCOUNTER — TELEPHONE (OUTPATIENT)
Dept: PRIMARY CARE CLINIC | Age: 68
End: 2023-08-09

## 2023-08-09 NOTE — TELEPHONE ENCOUNTER
She is not able to fill her Jardiance due to it jumping up 3 X as much. She will not be able to take this.

## 2023-08-09 NOTE — TELEPHONE ENCOUNTER
She should try the [employer] website for the savings card and add good Rx.  Maybe she can pay as little as $10. Let me know

## 2023-08-10 ENCOUNTER — TELEPHONE (OUTPATIENT)
Dept: PRIMARY CARE CLINIC | Age: 68
End: 2023-08-10

## 2023-08-10 NOTE — TELEPHONE ENCOUNTER
Griselda Mallory is not going to work for her. She is asking if you can prescribe something else for her?

## 2023-08-11 ENCOUNTER — TELEPHONE (OUTPATIENT)
Dept: PRIMARY CARE CLINIC | Age: 68
End: 2023-08-11

## 2023-08-11 NOTE — TELEPHONE ENCOUNTER
Pt called and stated that she called her insurance to see if the scripts are covered. Pt stated that Steglatro is not covered. Pt stated that Jacqlyn Pae is covered but too expensive. Pt stated that she put a coverage determination for the Jardiance.       Pt number [phone]

## 2023-08-14 ENCOUNTER — OFFICE VISIT (OUTPATIENT)
Dept: SPORTS MEDICINE | Age: 68
End: 2023-08-14
Payer: COMMERCIAL

## 2023-08-14 VITALS — HEIGHT: 66 IN | TEMPERATURE: 96.8 F | BODY MASS INDEX: 47.09 KG/M2 | WEIGHT: 293 LBS

## 2023-08-14 DIAGNOSIS — S42.252A CLOSED DISPLACED FRACTURE OF GREATER TUBEROSITY OF LEFT HUMERUS, INITIAL ENCOUNTER: Primary | ICD-10-CM

## 2023-08-14 PROCEDURE — 1123F ACP DISCUSS/DSCN MKR DOCD: CPT | Performed by: FAMILY MEDICINE

## 2023-08-14 PROCEDURE — 99214 OFFICE O/P EST MOD 30 MIN: CPT | Performed by: FAMILY MEDICINE

## 2023-08-14 ASSESSMENT — ENCOUNTER SYMPTOMS
CONSTIPATION: 0
SHORTNESS OF BREATH: 0
BACK PAIN: 0
DIARRHEA: 0
NAUSEA: 0

## 2023-08-14 NOTE — PROGRESS NOTES
[facility] ([facility]) Physicians  Neurosurgery and Pain [facility]  [address] [address] [place], [place]  P: [phone]  F: [phone]      Karsten White  (1955)    2023    Subjective:     Karsten White is 77 y.o. female who complains today of:    Chief Complaint   Patient presents with    Arm Pain       HPI     This patient comes in complaining of pain in her left shoulder which occurred from an injury on 2023 when she fell from her chair she states she went to the [facility] clinic ER and they got x-rays and told her she has a fracture she is here today for further evaluation and treatment  Allergies:  Budesonide-formoterol fumarate    Past Medical History:   Diagnosis Date    Asthma     COPD (chronic obstructive pulmonary disease) ([address])     Depression     Diabetes mellitus ([address])     Sleep apnea      Past Surgical History:   Procedure Laterality Date    CHOLECYSTECTOMY      COLONOSCOPY N/A 2021    COLORECTAL CANCER SCREENING with polypectomies performed by Zelda García MD at [facility]     Family History   Problem Relation Age of Onset    Breast Cancer Mother     Colon Cancer Neg Hx      Social History     Socioeconomic History    Marital status:      Spouse name: Not on file    Number of children: Not on file    Years of education: Not on file    Highest education level: Not on file   Occupational History    Not on file   Tobacco Use    Smoking status: Former     Packs/day: 1.00     Years: 40.00     Pack years: 40.00     Types: Cigarettes     Start date:      Quit date:      Years since quittin.6    Smokeless tobacco: Former     Quit date:    Vaping Use    Vaping Use: Never used   Substance and Sexual Activity    Alcohol use: Not Currently     Comment: occass    Drug use: Never    Sexual activity: Not on file   Other Topics Concern    Not on file   Social History Narrative    Not on file     Social Determinants of

## 2023-08-15 ENCOUNTER — HOSPITAL ENCOUNTER (OUTPATIENT)
Dept: CT IMAGING | Age: 68
Discharge: HOME | End: 2023-08-17
Payer: COMMERCIAL

## 2023-08-15 DIAGNOSIS — S42.252A CLOSED DISPLACED FRACTURE OF GREATER TUBEROSITY OF LEFT HUMERUS, INITIAL ENCOUNTER: ICD-10-CM

## 2023-08-15 PROCEDURE — 73200 CT UPPER EXTREMITY W/O DYE: CPT

## 2023-08-16 ENCOUNTER — OFFICE VISIT (OUTPATIENT)
Dept: ORTHOPEDIC SURGERY | Age: 68
End: 2023-08-16
Payer: COMMERCIAL

## 2023-08-16 VITALS
TEMPERATURE: 97.6 F | HEIGHT: 66 IN | BODY MASS INDEX: 47.09 KG/M2 | HEART RATE: 78 BPM | OXYGEN SATURATION: 98 % | WEIGHT: 293 LBS

## 2023-08-16 DIAGNOSIS — S42.252D CLOSED DISPLACED FRACTURE OF GREATER TUBEROSITY OF LEFT HUMERUS WITH ROUTINE HEALING, SUBSEQUENT ENCOUNTER: Primary | ICD-10-CM

## 2023-08-16 PROCEDURE — 99214 OFFICE O/P EST MOD 30 MIN: CPT | Performed by: PHYSICIAN ASSISTANT

## 2023-08-16 PROCEDURE — 1123F ACP DISCUSS/DSCN MKR DOCD: CPT | Performed by: PHYSICIAN ASSISTANT

## 2023-08-16 PROCEDURE — 23600 CLTX PROX HUMRL FX W/O MNPJ: CPT | Performed by: PHYSICIAN ASSISTANT

## 2023-08-16 RX ORDER — OXYCODONE HYDROCHLORIDE AND ACETAMINOPHEN 5; 325 MG/1; MG/1
1 TABLET ORAL EVERY 8 HOURS PRN
Qty: 21 TABLET | Refills: 0 | Status: SHIPPED | OUTPATIENT
Start: 2023-08-16 | End: 2023-08-23

## 2023-08-16 RX ORDER — IBUPROFEN 600 MG/1
600 TABLET ORAL EVERY 8 HOURS PRN
Qty: 60 TABLET | Refills: 0 | Status: SHIPPED | OUTPATIENT
Start: 2023-08-16

## 2023-08-16 NOTE — TELEPHONE ENCOUNTER
Requested Prescriptions     Pending Prescriptions Disp Refills    rosuvastatin (CRESTOR) 20 MG tablet 90 tablet 3     Sig: TAKE 1 TABLET BY MOUTH NIGHTLY

## 2023-08-16 NOTE — PROGRESS NOTES
Elodia Arceo (:  1955) is a 77 y.o. female,Established patient, here for evaluation of the following chief complaint(s):  New Patient (Left humerus fx)         ASSESSMENT/PLAN:  1. Closed displaced fracture of greater tuberosity of left humerus with routine healing, subsequent encounter  -     oxyCODONE-acetaminophen (PERCOCET) 5-325 MG per tablet; Take 1 tablet by mouth every 8 hours as needed for Pain for up to 7 days. Intended supply: 7 days. Take lowest dose possible to manage pain Max Daily Amount: 3 tablets, Disp-21 tablet, R-0Print      No follow-ups on file. Subjective   SUBJECTIVE/OBJECTIVE:  78-year-old right-hand-dominant female complaining of left shoulder pain. She fell awkwardly landing on her left arm 2023. Emergency department x-rays show a fracture of the greater tuberosity left humerus. He is unable to actively abduct her left arm above shoulder level secondary to pain. She is in a sling and swath. She is having difficulty with the swath portion. Review of Systems   Constitutional: Negative. HENT: Negative. Eyes: Negative. Respiratory: Negative. Gastrointestinal: Negative. Genitourinary: Negative. Musculoskeletal: Negative. Psychiatric/Behavioral: Negative. Objective EXAMINATION:  CT OF THE LEFT SHOULDER WITHOUT CONTRAST 8/15/2023 10:36 am     TECHNIQUE:  CT of the left shoulder was performed without the administration of  intravenous contrast.  Multiplanar reformatted images are provided for  review. Automated exposure control, iterative reconstruction, and/or weight  based adjustment of the mA/kV was utilized to reduce the radiation dose to as  low as reasonably achievable. COMPARISON:  None.      HISTORY  ORDERING SYSTEM PROVIDED HISTORY: Closed displaced fracture of greater  tuberosity of left humerus, initial encounter  TECHNOLOGIST PROVIDED HISTORY:  What reading provider will be dictating this exam?->CRC

## 2023-08-17 RX ORDER — ROSUVASTATIN CALCIUM 20 MG/1
TABLET, COATED ORAL
Qty: 90 TABLET | Refills: 3 | Status: SHIPPED | OUTPATIENT
Start: 2023-08-17

## 2023-08-18 ENCOUNTER — TELEPHONE (OUTPATIENT)
Dept: PRIMARY CARE CLINIC | Age: 68
End: 2023-08-18

## 2023-08-22 ASSESSMENT — ENCOUNTER SYMPTOMS
RESPIRATORY NEGATIVE: 1
EYES NEGATIVE: 1
GASTROINTESTINAL NEGATIVE: 1

## 2023-08-29 ENCOUNTER — TELEPHONE (OUTPATIENT)
Dept: PRIMARY CARE CLINIC | Age: 68
End: 2023-08-29

## 2023-08-29 DIAGNOSIS — E11.9 TYPE 2 DIABETES MELLITUS WITHOUT COMPLICATION, WITHOUT LONG-TERM CURRENT USE OF INSULIN (HCC): ICD-10-CM

## 2023-08-29 NOTE — TELEPHONE ENCOUNTER
She refilled the RX for Ozempic. The cost was $224.00. she will not be filling it in 3 weeks. Is there anything else she can take that is not so expensive?

## 2023-08-30 ENCOUNTER — OFFICE VISIT (OUTPATIENT)
Dept: PRIMARY CARE CLINIC | Age: 68
End: 2023-08-30
Payer: COMMERCIAL

## 2023-08-30 ENCOUNTER — APPOINTMENT (OUTPATIENT)
Dept: ULTRASOUND IMAGING | Age: 68
End: 2023-08-30
Payer: COMMERCIAL

## 2023-08-30 ENCOUNTER — HOSPITAL ENCOUNTER (EMERGENCY)
Age: 68
Discharge: HOME | End: 2023-08-30
Payer: COMMERCIAL

## 2023-08-30 VITALS
OXYGEN SATURATION: 97 % | BODY MASS INDEX: 47.09 KG/M2 | HEIGHT: 66 IN | HEART RATE: 86 BPM | TEMPERATURE: 97 F | SYSTOLIC BLOOD PRESSURE: 124 MMHG | WEIGHT: 293 LBS | DIASTOLIC BLOOD PRESSURE: 66 MMHG | RESPIRATION RATE: 18 BRPM

## 2023-08-30 VITALS — WEIGHT: 293 LBS | BODY MASS INDEX: 50.52 KG/M2

## 2023-08-30 VITALS
BODY MASS INDEX: 47.09 KG/M2 | OXYGEN SATURATION: 99 % | RESPIRATION RATE: 18 BRPM | WEIGHT: 293 LBS | SYSTOLIC BLOOD PRESSURE: 139 MMHG | DIASTOLIC BLOOD PRESSURE: 86 MMHG | HEIGHT: 66 IN | HEART RATE: 92 BPM | TEMPERATURE: 98.8 F

## 2023-08-30 DIAGNOSIS — S80.811A ABRASION OF RIGHT LOWER EXTREMITY, INITIAL ENCOUNTER: ICD-10-CM

## 2023-08-30 DIAGNOSIS — R22.41 LOCALIZED SWELLING OF RIGHT LOWER LEG: Primary | ICD-10-CM

## 2023-08-30 DIAGNOSIS — M79.89 LEG SWELLING: Primary | ICD-10-CM

## 2023-08-30 DIAGNOSIS — Z00.00 MEDICARE ANNUAL WELLNESS VISIT, SUBSEQUENT: Primary | ICD-10-CM

## 2023-08-30 DIAGNOSIS — E11.40 TYPE 2 DIABETES MELLITUS WITH DIABETIC NEUROPATHY, WITHOUT LONG-TERM CURRENT USE OF INSULIN (HCC): ICD-10-CM

## 2023-08-30 DIAGNOSIS — M79.89 LEG SWELLING: ICD-10-CM

## 2023-08-30 LAB
ALBUMIN SERPL-MCNC: 4 G/DL (ref 3.5–4.6)
ALP SERPL-CCNC: 93 U/L (ref 40–130)
ALT SERPL-CCNC: 16 U/L (ref 0–33)
ANION GAP SERPL CALCULATED.3IONS-SCNC: 12 MEQ/L (ref 9–15)
APTT PPP: 32.8 SEC (ref 24.4–36.8)
AST SERPL-CCNC: 21 U/L (ref 0–35)
BASOPHILS # BLD: 0 K/UL (ref 0–0.2)
BASOPHILS NFR BLD: 0.2 %
BILIRUB SERPL-MCNC: 0.9 MG/DL (ref 0.2–0.7)
BUN SERPL-MCNC: 14 MG/DL (ref 8–23)
CALCIUM SERPL-MCNC: 9.4 MG/DL (ref 8.5–9.9)
CHLORIDE SERPL-SCNC: 96 MEQ/L (ref 95–107)
CO2 SERPL-SCNC: 28 MEQ/L (ref 20–31)
CREAT SERPL-MCNC: 1.05 MG/DL (ref 0.5–0.9)
D DIMER PPP FEU-MCNC: 2.78 MG/L FEU (ref 0–0.5)
EOSINOPHIL # BLD: 0.3 K/UL (ref 0–0.7)
EOSINOPHIL NFR BLD: 2.6 %
ERYTHROCYTE [DISTWIDTH] IN BLOOD BY AUTOMATED COUNT: 13.8 % (ref 11.5–14.5)
GLOBULIN SER CALC-MCNC: 2.8 G/DL (ref 2.3–3.5)
GLUCOSE SERPL-MCNC: 118 MG/DL (ref 70–99)
HCT VFR BLD AUTO: 37.3 % (ref 37–47)
HGB BLD-MCNC: 12.2 G/DL (ref 12–16)
INR PPP: 1.1
LYMPHOCYTES # BLD: 2.9 K/UL (ref 1–4.8)
LYMPHOCYTES NFR BLD: 22.1 %
MCH RBC QN AUTO: 30.6 PG (ref 27–31.3)
MCHC RBC AUTO-ENTMCNC: 32.9 % (ref 33–37)
MCV RBC AUTO: 93.3 FL (ref 79.4–94.8)
MONOCYTES # BLD: 0.8 K/UL (ref 0.2–0.8)
MONOCYTES NFR BLD: 6 %
NEUTROPHILS # BLD: 9.1 K/UL (ref 1.4–6.5)
NEUTS SEG NFR BLD: 69.1 %
PLATELET # BLD AUTO: 461 K/UL (ref 130–400)
POTASSIUM SERPL-SCNC: 5 MEQ/L (ref 3.4–4.9)
PROT SERPL-MCNC: 6.8 G/DL (ref 6.3–8)
PROTHROMBIN TIME: 13.8 SEC (ref 12.3–14.9)
RBC # BLD AUTO: 4 M/UL (ref 4.2–5.4)
SODIUM SERPL-SCNC: 136 MEQ/L (ref 135–144)
WBC # BLD AUTO: 13.1 K/UL (ref 4.8–10.8)

## 2023-08-30 PROCEDURE — 93971 EXTREMITY STUDY: CPT

## 2023-08-30 PROCEDURE — 85610 PROTHROMBIN TIME: CPT

## 2023-08-30 PROCEDURE — 85730 THROMBOPLASTIN TIME PARTIAL: CPT

## 2023-08-30 PROCEDURE — G0439 PPPS, SUBSEQ VISIT: HCPCS | Performed by: INTERNAL MEDICINE

## 2023-08-30 PROCEDURE — 99214 OFFICE O/P EST MOD 30 MIN: CPT | Performed by: INTERNAL MEDICINE

## 2023-08-30 PROCEDURE — 1123F ACP DISCUSS/DSCN MKR DOCD: CPT | Performed by: INTERNAL MEDICINE

## 2023-08-30 PROCEDURE — 80053 COMPREHEN METABOLIC PANEL: CPT

## 2023-08-30 PROCEDURE — 36415 COLL VENOUS BLD VENIPUNCTURE: CPT

## 2023-08-30 PROCEDURE — 99284 EMERGENCY DEPT VISIT MOD MDM: CPT

## 2023-08-30 PROCEDURE — 3051F HG A1C>EQUAL 7.0%<8.0%: CPT | Performed by: INTERNAL MEDICINE

## 2023-08-30 PROCEDURE — 85025 COMPLETE CBC W/AUTO DIFF WBC: CPT

## 2023-08-30 RX ORDER — GLIMEPIRIDE 1 MG/1
3 TABLET ORAL EVERY MORNING
Qty: 270 TABLET | Refills: 3 | Status: SHIPPED | OUTPATIENT
Start: 2023-08-30

## 2023-08-30 RX ORDER — OXYCODONE HYDROCHLORIDE AND ACETAMINOPHEN 5; 325 MG/1; MG/1
1 TABLET ORAL EVERY 4 HOURS PRN
COMMUNITY

## 2023-08-30 ASSESSMENT — PATIENT HEALTH QUESTIONNAIRE - PHQ9
3. TROUBLE FALLING OR STAYING ASLEEP: 0
SUM OF ALL RESPONSES TO PHQ9 QUESTIONS 1 & 2: 0
SUM OF ALL RESPONSES TO PHQ QUESTIONS 1-9: 0
7. TROUBLE CONCENTRATING ON THINGS, SUCH AS READING THE NEWSPAPER OR WATCHING TELEVISION: 0
9. THOUGHTS THAT YOU WOULD BE BETTER OFF DEAD, OR OF HURTING YOURSELF: 0
6. FEELING BAD ABOUT YOURSELF - OR THAT YOU ARE A FAILURE OR HAVE LET YOURSELF OR YOUR FAMILY DOWN: 0
4. FEELING TIRED OR HAVING LITTLE ENERGY: 0
1. LITTLE INTEREST OR PLEASURE IN DOING THINGS: 0
SUM OF ALL RESPONSES TO PHQ QUESTIONS 1-9: 0
5. POOR APPETITE OR OVEREATING: 0
SUM OF ALL RESPONSES TO PHQ QUESTIONS 1-9: 0
10. IF YOU CHECKED OFF ANY PROBLEMS, HOW DIFFICULT HAVE THESE PROBLEMS MADE IT FOR YOU TO DO YOUR WORK, TAKE CARE OF THINGS AT HOME, OR GET ALONG WITH OTHER PEOPLE: 0
2. FEELING DOWN, DEPRESSED OR HOPELESS: 0
SUM OF ALL RESPONSES TO PHQ QUESTIONS 1-9: 0
8. MOVING OR SPEAKING SO SLOWLY THAT OTHER PEOPLE COULD HAVE NOTICED. OR THE OPPOSITE, BEING SO FIGETY OR RESTLESS THAT YOU HAVE BEEN MOVING AROUND A LOT MORE THAN USUAL: 0

## 2023-08-30 ASSESSMENT — LIFESTYLE VARIABLES
HOW MANY STANDARD DRINKS CONTAINING ALCOHOL DO YOU HAVE ON A TYPICAL DAY: PATIENT DOES NOT DRINK
HOW OFTEN DO YOU HAVE A DRINK CONTAINING ALCOHOL: NEVER
HOW MANY STANDARD DRINKS CONTAINING ALCOHOL DO YOU HAVE ON A TYPICAL DAY: PATIENT DOES NOT DRINK
HOW OFTEN DO YOU HAVE A DRINK CONTAINING ALCOHOL: NEVER

## 2023-08-30 ASSESSMENT — ENCOUNTER SYMPTOMS
VOMITING: 0
DIARRHEA: 0
SHORTNESS OF BREATH: 0
COUGH: 0
WHEEZING: 0
NAUSEA: 0
COLOR CHANGE: 1
ABDOMINAL PAIN: 0

## 2023-08-30 ASSESSMENT — PAIN SCALES - GENERAL: PAINLEVEL_OUTOF10: 0

## 2023-08-30 ASSESSMENT — COLUMBIA-SUICIDE SEVERITY RATING SCALE - C-SSRS
6. HAVE YOU EVER DONE ANYTHING, STARTED TO DO ANYTHING, OR PREPARED TO DO ANYTHING TO END YOUR LIFE?: NO
2. HAVE YOU ACTUALLY HAD ANY THOUGHTS OF KILLING YOURSELF?: NO
1. WITHIN THE PAST MONTH, HAVE YOU WISHED YOU WERE DEAD OR WISHED YOU COULD GO TO SLEEP AND NOT WAKE UP?: NO

## 2023-08-30 ASSESSMENT — PAIN - FUNCTIONAL ASSESSMENT: PAIN_FUNCTIONAL_ASSESSMENT: NONE - DENIES PAIN

## 2023-08-30 NOTE — PROGRESS NOTES
Subjective:      Patient ID: Dora Griffin is a 77 y.o. female    Foot swelling x 3 days   HPI  Pt presents with 3 days of foot swelling. No pain, but assoc leg tightness. Denies salt intake. Attests to sedentary lifestyle. No CP but attests to increased SOB. No hx DVT/PE. Attests to antecedent fall and right leg abrasions. Also sustained a left humeral fracture- ortho recommended shoulder sling and PT. Tdap up to date. Past Medical History:   Diagnosis Date    Asthma     COPD (chronic obstructive pulmonary disease) ([facility])     Depression     Diabetes mellitus ([address])     Sleep apnea      Past Surgical History:   Procedure Laterality Date    CHOLECYSTECTOMY      COLONOSCOPY N/A 2021    COLORECTAL CANCER SCREENING with polypectomies performed by Tiffany Foss MD at [address] [place] [address] History    Marital status:      Spouse name: Not on file    Number of children: Not on file    Years of education: Not on file    Highest education level: Not on file   Occupational History    Not on file   Tobacco Use    Smoking status: Former     Packs/day: 1.00     Years: 40.00     Pack years: 40.00     Types: Cigarettes     Start date:      Quit date:      Years since quittin.6    Smokeless tobacco: Former     Quit date:    Vaping Use    Vaping Use: Never used   Substance and Sexual Activity    Alcohol use: Not Currently     Comment: occass    Drug use: Never    Sexual activity: Not on file   Other Topics Concern    Not on file   Social History Narrative    Not on file     Social Determinants of Health     Financial Resource Strain: Low Risk     Difficulty of Paying Living Expenses: Not hard at all   Food Insecurity: No Food Insecurity    Worried About [place] in the Last Year: Never true    [address] in the Last Year: Never true   Transportation Needs: Unknown    Lack of Transportation (Medical):  Not on file    Lack of Transportation

## 2023-08-31 ASSESSMENT — ENCOUNTER SYMPTOMS
VOMITING: 0
COUGH: 0
DIARRHEA: 0
ABDOMINAL PAIN: 0
PHOTOPHOBIA: 0
SHORTNESS OF BREATH: 0
COLOR CHANGE: 0
NAUSEA: 0

## 2023-08-31 NOTE — ED TRIAGE NOTES
Patient arrived from home with complaint of going to PCP today for right swollen leg x3 days. On august 13th patient fell and injury left shoulder and has abrasions on right LLE that are healed. Msp intact. Swelling noted on right ankle. Patient A&OX4. Has complaint of SOB, but nothing more then usual due to hx of COPD per patient.

## 2023-08-31 NOTE — ED PROVIDER NOTES
[facility] ED  eMERGENCY dEPARTMENT eNCOUnter      Pt Name: Marquez Zhou  MRN: [id number]  [address] 1955  Date of evaluation: 8/30/2023  Provider: [place], [place]        HISTORY OF PRESENT ILLNESS    Marquez Zhou is a 77 y.o. female per chart review has ah/o CKD, diabetes, diabetic nephropathy, tobacco abuse. Patient presents to the emergency department for evaluation of right leg swelling, x3 days. Patient states recent traumatic injury in which she fell and sustained an abrasion to that right leg. Right lateral calf. Patient was evaluated by her physician Dr. Gifty Dean, D-dimer was performed and elevated so she was sent here. Patient states she was also evaluated at [address] [facility] at time of the initial injury with x-rays performed which did not demonstrate an acute bony process. Patient denies any gait disturbance. She denies numbness, cold sensation, pallor to the extremity. She denies noting any purulent discharge, blood, rashes or warmth to the wound. Denies significant pain with movement of the right lower extremity or the right foot. She denies any chest pain, shortness of breath. No fevers, chills, nausea, vomiting. No palpitations. REVIEW OF SYSTEMS       Review of Systems   Constitutional:  Negative for chills and fever. HENT:  Negative for congestion. Eyes:  Negative for photophobia. Respiratory:  Negative for cough and shortness of breath. Cardiovascular:  Positive for leg swelling. Negative for chest pain. Gastrointestinal:  Negative for abdominal pain, diarrhea, nausea and vomiting. Genitourinary:  Negative for difficulty urinating. Musculoskeletal:  Negative for gait problem, joint swelling and myalgias. Skin:  Positive for wound. Negative for color change and rash. Neurological:  Negative for headaches. Psychiatric/Behavioral:  Negative for confusion.       Except as noted above the remainder of the review of systems was reviewed and

## 2023-08-31 NOTE — ED NOTES
Patient refusing IV. Butterfly stick to right ac. Blood obtained. Sent to lab. Tolerated well.       Aleja Rangel RN  08/30/23 [id number]

## 2023-09-07 ENCOUNTER — OFFICE VISIT (OUTPATIENT)
Dept: ORTHOPEDIC SURGERY | Age: 68
End: 2023-09-07

## 2023-09-07 ENCOUNTER — HOSPITAL ENCOUNTER (OUTPATIENT)
Dept: ORTHOPEDIC SURGERY | Age: 68
Discharge: HOME | End: 2023-09-09
Payer: COMMERCIAL

## 2023-09-07 VITALS
SYSTOLIC BLOOD PRESSURE: 104 MMHG | BODY MASS INDEX: 47.09 KG/M2 | HEIGHT: 66 IN | DIASTOLIC BLOOD PRESSURE: 74 MMHG | WEIGHT: 293 LBS

## 2023-09-07 DIAGNOSIS — S42.252D CLOSED DISPLACED FRACTURE OF GREATER TUBEROSITY OF LEFT HUMERUS WITH ROUTINE HEALING, SUBSEQUENT ENCOUNTER: Primary | ICD-10-CM

## 2023-09-07 DIAGNOSIS — S42.252D CLOSED DISPLACED FRACTURE OF GREATER TUBEROSITY OF LEFT HUMERUS WITH ROUTINE HEALING, SUBSEQUENT ENCOUNTER: ICD-10-CM

## 2023-09-07 PROCEDURE — 73030 X-RAY EXAM OF SHOULDER: CPT

## 2023-09-07 PROCEDURE — 99024 POSTOP FOLLOW-UP VISIT: CPT | Performed by: PHYSICIAN ASSISTANT

## 2023-09-07 NOTE — PROGRESS NOTES
Mami Dunn (:  1955) is a 77 y.o. female,Established patient, here for evaluation of the following chief complaint(s):  Follow-up (Left shoulder)         ASSESSMENT/PLAN:  1. Closed displaced fracture of greater tuberosity of left humerus with routine healing, subsequent encounter  -     XR SHOULDER LEFT (MIN 2 VIEWS); Future      No follow-ups on file. Subjective   SUBJECTIVE/OBJECTIVE:  This is a 68-year-old female following up for fracture of her left humeral head sustained after a fall 2023. Patient has a multipart fracture of the humeral head. She is presently in a sling and swath. States her pain is continues to improve. She is having difficulty raising her arm up to shoulder level secondary to pain. Review of Systems   Constitutional: Negative. HENT: Negative. Eyes: Negative. Respiratory: Negative. Gastrointestinal: Negative. Genitourinary: Negative. Musculoskeletal: Negative. Psychiatric/Behavioral: Negative. Objective   Left shoulder x-rays, 3 views, multipart fracture of the humeral head, anterior subluxation of the humeral head fragment. Bony callus formation is present. Physical Exam  Musculoskeletal:      Comments: Left shoulder-no acromioclavicular, clavicle, SC joint tenderness with palpation. Abduction and abduction strength is significantly decreased in comparison to the right. Internal rotation is 0 degrees, external rotation is painful at 90 degrees. Patient is unable to abduct the left arm up to shoulder level actively. Passively I can raise the arm up to shoulder level. Bicep contour is normal.  Sensation is intact distally to light touch     Explained to the patient her fracture is showing early signs of healing. We discussed previously that she may end up with shoulder replacement the future. Would like to see how much range of motion she recovers. I will see her back in 3 to 4 weeks, we will repeat the x-rays.

## 2023-09-08 RX ORDER — FLUTICASONE FUROATE, UMECLIDINIUM BROMIDE AND VILANTEROL TRIFENATATE 200; 62.5; 25 UG/1; UG/1; UG/1
1 POWDER RESPIRATORY (INHALATION) DAILY
Qty: 2 EACH | Refills: 0 | Status: SHIPPED | COMMUNITY
Start: 2023-09-08

## 2023-09-08 ASSESSMENT — ENCOUNTER SYMPTOMS
RESPIRATORY NEGATIVE: 1
EYES NEGATIVE: 1
GASTROINTESTINAL NEGATIVE: 1

## 2023-09-19 ENCOUNTER — HOSPITAL ENCOUNTER (OUTPATIENT)
Dept: PHYSICAL THERAPY | Age: 68
Setting detail: THERAPIES SERIES
Discharge: HOME | End: 2023-09-19
Payer: COMMERCIAL

## 2023-09-19 PROCEDURE — 97162 PT EVAL MOD COMPLEX 30 MIN: CPT

## 2023-09-19 PROCEDURE — 97110 THERAPEUTIC EXERCISES: CPT

## 2023-09-19 ASSESSMENT — PAIN DESCRIPTION - LOCATION: LOCATION: SHOULDER

## 2023-09-19 ASSESSMENT — PAIN SCALES - GENERAL: PAINLEVEL_OUTOF10: 0

## 2023-09-19 ASSESSMENT — PAIN DESCRIPTION - DESCRIPTORS: DESCRIPTORS: SHARP;SHOOTING;TINGLING;NUMBNESS

## 2023-09-19 NOTE — PROGRESS NOTES
[address]    [facility], [address]  Phone: [phone]                             Physical Therapy: Initial Evaluation    Patient: Sarah Allen (67 y.o.     female)   Examination Date: 2023   :  1955 ;    Confirmed: Yes MRN: [id number]  CSN: [id number]   Insurance: Payor: [address] [place] [address] / Plan: [address] [place] [address] / Product Type: *No Product type* /   Insurance ID: [id number] - (Medicare Managed) PT Insurance Information: [address] (if applicable):    Referring Physician: ERICA Bashir      PCP: Tomi Farrell MD Visits to Date/Visits Approved:  /  (BMN, $40 copay)    No Show/Cancelled Appts: 0 / 0     Medical Diagnosis: Closed displaced fracture of greater tuberosity of left humerus with routine healing, subsequent encounter [P93.332L]    Treatment Diagnosis: L Shoulder pain (healing fracture) with decreased strength and ROM affecting overall functional tolerance.      PERTINENT MEDICAL HISTORY   Patient Assessed for Rehabilitation Services: Yes       Medical History: Chart Reviewed: Yes   Past Medical History:   Diagnosis Date    Asthma     COPD (chronic obstructive pulmonary disease) ([address])     Depression     Diabetes mellitus ([address])     Sleep apnea      Surgical History:   Past Surgical History:   Procedure Laterality Date    CHOLECYSTECTOMY      COLONOSCOPY N/A 2021    COLORECTAL CANCER SCREENING with polypectomies performed by Talha Melvin MD at [facility]       Medications:   Current Outpatient Medications:     fluticasone-umeclidin-vilant (TRELEGY ELLIPTA) 200-62.5-25 MCG/ACT AEPB inhaler, Inhale 1 puff into the lungs daily, Disp: 2 each, Rfl: 0    glimepiride (AMARYL) 1 MG tablet, Take 3 tablets by mouth every morning, Disp: 270 tablet, Rfl: 3    oxyCODONE-acetaminophen (PERCOCET) 5-325 MG per tablet, Take 1 tablet by mouth every 4 hours as needed for Pain., Disp: , Rfl:     rosuvastatin (CRESTOR)
Pain-free L Shoulder AROM to increase >/=20-40 deg all planes allowing an increase in ADL tolerance. General AROM UE:  (supination 80 R, 65 L)  AROM LUE (degrees)  L Shoulder Flexion (0-180): 150  L Shoulder Extension (0-45): 30  L Shoulder ABduction (0-180): NT per script  L Shoulder Int Rotation  (0-70): 0  L Shoulder Ext Rotation  (0-90): 30  L Elbow Flexion (0-145): 145  L Elbow Extension (145-0): -10  AROM RUE (degrees)  R Shoulder Flexion (0-180): 40  R Shoulder Extension (0-45): 45  R Shoulder ABduction (0-180): 180  R Shoulder Ext Rotation (0-90): 70  R Elbow Flexion (0-145): WNL  R Elbow Extension (145-0): WNL    LTG Goal 3 Status[de-identified] New   Long Term Goal 4: Improve L Shoulder strength 3+/5  to allow patient to tolerate daily activity (when cleared for strengthening) General Strength Testing UE:  (NT due to known fx on L UE)    Strength RUE  R Shoulder Flexion: 4-/5  R Shoulder Extension: 4-/5  R Shoulder ABduction: 4-/5  R Shoulder Internal Rotation: 4/5  R Shoulder External Rotation: 4/5  R Elbow Flexion: 4/5  R Elbow Extension: 4/5   LTG Goal 4 Status[de-identified] New     Body Structures, Functions, Activity Limitations Requiring Skilled Therapeutic Intervention: Decreased ROM, Decreased strength, Decreased balance, Decreased posture, Increased pain, Decreased ADL status  Assessment: The pt's impairments currently limit functional abilities by 79% including her abilities to reach and lift, carry, perform recreational activities, and perform household/work related duties without pain or limitations. Skilled PT required to address above deficits to improve overall function and return to prior level of function. Therapy Prognosis: Good      PT Education: Goals;PT Role;Plan of Care;Evaluative findings; Insurance    PLAN: [x] Evaluate and Treat  Frequency/Duration:  Plan Frequency: 1x every 2 weeks per pt request  Plan weeks: 6-8  Current Treatment Recommendations: Strengthening, ROM, Positioning, Modalities, Home

## 2023-09-20 ENCOUNTER — TELEPHONE (OUTPATIENT)
Dept: PRIMARY CARE CLINIC | Age: 68
End: 2023-09-20

## 2023-09-20 DIAGNOSIS — E11.40 TYPE 2 DIABETES MELLITUS WITH DIABETIC NEUROPATHY, WITHOUT LONG-TERM CURRENT USE OF INSULIN (HCC): Primary | ICD-10-CM

## 2023-09-20 RX ORDER — SEMAGLUTIDE 0.68 MG/ML
0.5 INJECTION, SOLUTION SUBCUTANEOUS WEEKLY
Qty: 3 ML | Refills: 5 | Status: SHIPPED | OUTPATIENT
Start: 2023-09-20

## 2023-09-26 ENCOUNTER — APPOINTMENT (OUTPATIENT)
Dept: PHYSICAL THERAPY | Age: 68
End: 2023-09-26
Payer: COMMERCIAL

## 2023-09-27 ENCOUNTER — HOSPITAL ENCOUNTER (OUTPATIENT)
Dept: PHYSICAL THERAPY | Age: 68
Setting detail: THERAPIES SERIES
Discharge: HOME | End: 2023-09-27
Payer: COMMERCIAL

## 2023-09-27 PROCEDURE — G0283 ELEC STIM OTHER THAN WOUND: HCPCS

## 2023-09-27 PROCEDURE — 97110 THERAPEUTIC EXERCISES: CPT

## 2023-09-27 ASSESSMENT — PAIN DESCRIPTION - DESCRIPTORS: DESCRIPTORS: ACHING

## 2023-09-27 ASSESSMENT — PAIN SCALES - GENERAL: PAINLEVEL_OUTOF10: 1

## 2023-09-27 ASSESSMENT — PAIN DESCRIPTION - LOCATION: LOCATION: SHOULDER

## 2023-09-27 NOTE — PROGRESS NOTES
[address]  [facility] [address]  [facility][phone]      Physical TherapyTreatment Note        Date: 2023  Patient: Erasmo Canela  : 1955   Confirmed: Yes  MRN: [id number]  Referring Provider: ERICA Rowell      Medical Diagnosis: Osteoarthritis of spine with radiculopathy, cervical region [M47.22]  Osteoarthritis of spine with radiculopathy, lumbar region [M47.26]      Treatment Diagnosis: L Shoulder pain (healing fracture) with decreased strength and ROM affecting overall functional tolerance. Visit Information:  Insurance: Payor: [facility] [employer] PLAN / Plan: [address] [place] [address] Dr / Product Type: *No Product type* /   PT Visit Information  Onset Date: 23  PT Insurance Information: [id number]  Total # of Visits Approved:  (BMN, $40 copay)  Total # of Visits to Date: 2  Plan of Care/Certification Expiration Date: 23  No Show: 0  Progress Note Due Date: 10/19/23  Canceled Appointment: 0  Progress Note Counter: 2 (PN due 10/19/23)    Subjective Information:  Subjective: Pt reports thats a follow up next week with . Pt reports was able to wash dishes yesterday placed L arm on counter to balance stuff. HEP Compliance:  [x] Good [] Fair [] Poor [] Reports not doing due to:    Pain Screening  Patient Currently in Pain: Yes  Pain Assessment: 0-10  Pain Level: 1  Pain Location: Shoulder  Pain Descriptors: Aching    Treatment:  Exercises:  Exercises  Exercise 1: UT stretch/ levator 3 x30 sec  Exercise 3: gentle shoulder shrugs/scap retraction x10  Exercise 4: shoulder shrug x 5, L elbow flexion/ext x5, L wrist flex/ext x 5, forearm supination x 5,  Exercise 6: trialed 1 rep PROM seated shoulder flexion with increased discomfort to 40 degrees.       Modalities:  Cryotherapy (CPT 49086)  Patient Position: Seated  Number Minutes Cryotherapy: 10  Cryotherapy location: Left, Shoulder  Cryotherapy specified location: L shoulder  Post treatment skin

## 2023-10-02 ENCOUNTER — HOSPITAL ENCOUNTER (OUTPATIENT)
Dept: ORTHOPEDIC SURGERY | Age: 68
Discharge: HOME | End: 2023-10-04
Payer: COMMERCIAL

## 2023-10-02 ENCOUNTER — OFFICE VISIT (OUTPATIENT)
Dept: ORTHOPEDIC SURGERY | Age: 68
End: 2023-10-02

## 2023-10-02 VITALS
HEART RATE: 90 BPM | TEMPERATURE: 97.5 F | BODY MASS INDEX: 47.09 KG/M2 | HEIGHT: 66 IN | WEIGHT: 293 LBS | OXYGEN SATURATION: 97 %

## 2023-10-02 DIAGNOSIS — S42.252D CLOSED DISPLACED FRACTURE OF GREATER TUBEROSITY OF LEFT HUMERUS WITH ROUTINE HEALING, SUBSEQUENT ENCOUNTER: ICD-10-CM

## 2023-10-02 DIAGNOSIS — S42.252D CLOSED DISPLACED FRACTURE OF GREATER TUBEROSITY OF LEFT HUMERUS WITH ROUTINE HEALING, SUBSEQUENT ENCOUNTER: Primary | ICD-10-CM

## 2023-10-02 PROCEDURE — 73030 X-RAY EXAM OF SHOULDER: CPT

## 2023-10-02 PROCEDURE — 99024 POSTOP FOLLOW-UP VISIT: CPT | Performed by: PHYSICIAN ASSISTANT

## 2023-10-02 ASSESSMENT — ENCOUNTER SYMPTOMS
RESPIRATORY NEGATIVE: 1
EYES NEGATIVE: 1
GASTROINTESTINAL NEGATIVE: 1

## 2023-10-02 NOTE — PROGRESS NOTES
Paul Villalpando (:  1955) is a 77 y.o. female,Established patient, here for evaluation of the following chief complaint(s):  Follow-up (Patient presents for a 3 week follow up on Closed displaced fracture of greater tuberosity of left humerus. )         ASSESSMENT/PLAN:  1. Closed displaced fracture of greater tuberosity of left humerus with routine healing, subsequent encounter  -     XR SHOULDER LEFT (MIN 2 VIEWS); Future      No follow-ups on file. Subjective   SUBJECTIVE/OBJECTIVE:  This is a 68-year-old right-hand-dominant female following up with complaint of left shoulder pain. Patient fell off technique table 2023. She has a displaced fracture of the left humeral neck. She has been in a sling and swath and subsequently has just been in a sling for the last several weeks. She denies change in sensation of the hand. She states she is having more good days than bad. She has started physical therapy. She is working on gentle range of motion. Review of Systems   Constitutional: Negative. HENT: Negative. Eyes: Negative. Respiratory: Negative. Gastrointestinal: Negative. Genitourinary: Negative. Musculoskeletal: Negative. Psychiatric/Behavioral: Negative. Objective   Radiographs left shoulder, 3 views, show a healing displaced fracture of the humeral neck. Physical Exam  Musculoskeletal:      Comments: Left shoulder-no acromioclavicular, clavicle, SC joint tenderness with palpation. Internal rotation is 0 degrees, external rotation is painful at 80 degrees. Patient abducts the left arm away from the body without difficulty. She is unable to abduct the arm up to shoulder level. Biceps contour is normal.  Patient is unable to reach L5 behind her back. Sensation is intact distally to light touch. Explained to the patient that she does have a humeral neck fracture. I recommended she continue physical therapy.   Like to see her back in

## 2023-10-18 ENCOUNTER — HOSPITAL ENCOUNTER (OUTPATIENT)
Dept: PHYSICAL THERAPY | Age: 68
Setting detail: THERAPIES SERIES
Discharge: HOME | End: 2023-10-18
Payer: COMMERCIAL

## 2023-10-18 PROCEDURE — 97140 MANUAL THERAPY 1/> REGIONS: CPT

## 2023-10-18 PROCEDURE — G0283 ELEC STIM OTHER THAN WOUND: HCPCS

## 2023-10-18 PROCEDURE — 97110 THERAPEUTIC EXERCISES: CPT

## 2023-10-18 ASSESSMENT — PAIN DESCRIPTION - LOCATION: LOCATION: SHOULDER

## 2023-10-18 ASSESSMENT — PAIN SCALES - GENERAL: PAINLEVEL_OUTOF10: 5

## 2023-10-18 ASSESSMENT — PAIN DESCRIPTION - DESCRIPTORS: DESCRIPTORS: ACHING

## 2023-10-18 NOTE — PROGRESS NOTES
[address]     [address]:[phone]      PHYSICAL THERAPY PLAN OF CARE     [] Certification  [] Recertification []  Plan of Care  [x] Progress Note [] Discharge      Referring Provider: ERICA Macario    From:  [facility]    Patient: Eloy Wade (81 y.o. female) : 1955 Date: 10/18/2023   Medical Diagnosis: Osteoarthritis of spine with radiculopathy, cervical region [M47.22]  Osteoarthritis of spine with radiculopathy, lumbar region [M47.26]    Treatment Diagnosis: L Shoulder pain (healing fracture) with decreased strength and ROM affecting overall functional tolerance. Plan of Care/Certification Expiration Date: 23   Progress Report Period from: 23 to 10/18/2023    Visits to Date: 3 No Show: 0 Cancelled Appts: 0    OBJECTIVE:   Short Term Goals - Time Frame for Short Term Goals: 2-3 weeks    Goals Current/Discharge status  Status   Short Term Goal 1: The pt will demonstrate improved postural awareness requiring <25% VC's with exercises  STG 1 Current Status[de-identified] Pt needed vc 50 % for posture   In progress   Short Term Goal 2: Decrease L shoulder pain 50% to assist with improved functional gains. STG 2 Current Status[de-identified] Pt reports pain in L shoulder decreased 15%   In progress       Long Term Goals - Time Frame for Long Term Goals : 4-8 weeks  Goals Current/ Discharge status Status   Long Term Goal 1: Indep HEP for symptom management LTG 1 Current Status[de-identified] I with HEP so far   In progress   Long Term Goal 2: Pt demo improved overall function by reporting greater than 60% functional survey score LTG 2 Current Status[de-identified] 26 % functional according to the UEFI 21/80   In progress   Long Term Goal 3: Pain-free L Shoulder AROM to increase >/=20-40 deg all planes allowing an increase in ADL tolerance.  LTG 3 Current Status[de-identified] L shoulder flexion 48 abduction 0 IR 30 degrees ER 0 degrees   In progress   Long Term Goal 4: Improve L Shoulder
Completed by 2-3 weeks Goal Status   STG 1 The pt will demonstrate improved postural awareness requiring <25% VC's with exercises In progress   STG 2 Decrease L shoulder pain 50% to assist with improved functional gains. In progress     Long Term Goals Completed by 4-8 weeks Goal Status   LTG 1 Indep HEP for symptom management In progress   LTG 2 Pt demo improved overall function by reporting greater than 60% functional survey score In progress   LTG 3 Pain-free L Shoulder AROM to increase >/=20-40 deg all planes allowing an increase in ADL tolerance. In progress   LTG 4 Improve L Shoulder strength 3+/5  to allow patient to tolerate daily activity (when cleared for strengthening) In progress     Plan:  Frequency/Duration:  Plan  Plan Frequency: 1x every 2 weeks per pt request  Plan weeks: 6-8  Current Treatment Recommendations: Strengthening, ROM, Positioning, Modalities, Home exercise program, Neuromuscular re-education, Manual  Modalities: Heat/Cold, E-stim - unattended  Pt to continue current HEP. See objective section for any therapeutic exercise changes, additions or modifications this date.     Therapy Time:      PT Individual Minutes  Time In: 1300  Time Out: 1355  Minutes: 55  Timed Code Treatment Minutes: 38 Minutes  Procedure Minutes:10 min Estim and CP  Timed Activity Minutes Units   Ther Ex 30 2   Manual  8 1     Electronically signed by Zora Lynn PTA on 10/18/23 at 3:02 PM EDT

## 2023-10-19 RX ORDER — FLUTICASONE FUROATE, UMECLIDINIUM BROMIDE AND VILANTEROL TRIFENATATE 200; 62.5; 25 UG/1; UG/1; UG/1
1 POWDER RESPIRATORY (INHALATION) DAILY
Qty: 2 EACH | Refills: 0 | COMMUNITY
Start: 2023-10-19

## 2023-10-24 ENCOUNTER — HOSPITAL ENCOUNTER (OUTPATIENT)
Dept: PHYSICAL THERAPY | Age: 68
Setting detail: THERAPIES SERIES
Discharge: HOME | End: 2023-10-24
Payer: COMMERCIAL

## 2023-10-24 PROCEDURE — 97110 THERAPEUTIC EXERCISES: CPT

## 2023-10-24 PROCEDURE — G0283 ELEC STIM OTHER THAN WOUND: HCPCS

## 2023-10-24 ASSESSMENT — PAIN SCALES - GENERAL: PAINLEVEL_OUTOF10: 0

## 2023-10-24 NOTE — PROGRESS NOTES
unattended (CPT [id number]) /  (Medicare)  Patient Position: Seated  E-stim location: Shoulder, Left  E-stim type: Interferential (IFC)  E-stim via: 4 Electrode Pads  E-stim Parameters: 10 min  Post treatment skin assessment: Intact       *Indicates exercise, modality, or manual techniques to be initiated when appropriate        Assessment: Body Structures, Functions, Activity Limitations Requiring Skilled Therapeutic Intervention: Decreased ROM, Decreased strength, Decreased balance, Decreased posture, Increased pain, Decreased ADL status  Assessment: Pt with increase ROM noted with PROM this date. Pt able to sondra more this session. Treatment Diagnosis: L Shoulder pain (healing fracture) with decreased strength and ROM affecting overall functional tolerance. Therapy Prognosis: Good          Post-Pain Assessment:       Pain Rating (0-10 pain scale):  0 /10   Location and pain description same as pre-treatment unless indicated. Action: [] NA   [x] Perform HEP  [] Meds as prescribed  [] Modalities as prescribed   [] Call Physician     GOALS   Patient Goal(s): Patient Goals : Decrease pain    Short Term Goals Completed by 2-3 weeks Goal Status   STG 1 The pt will demonstrate improved postural awareness requiring <25% VC's with exercises In progress   STG 2 Decrease L shoulder pain 50% to assist with improved functional gains. In progress     Long Term Goals Completed by 4-8 weeks Goal Status   LTG 1 Indep HEP for symptom management In progress   LTG 2 Pt demo improved overall function by reporting greater than 60% functional survey score In progress   LTG 3 Pain-free L Shoulder AROM to increase >/=20-40 deg all planes allowing an increase in ADL tolerance.  In progress   LTG 4 Improve L Shoulder strength 3+/5  to allow patient to tolerate daily activity (when cleared for strengthening) In progress       Plan:  Frequency/Duration:  Plan  Plan Frequency: 1x every 2 weeks per pt request  Plan weeks: 6-8  Current

## 2023-11-07 ENCOUNTER — HOSPITAL ENCOUNTER (OUTPATIENT)
Dept: PHYSICAL THERAPY | Age: 68
Setting detail: THERAPIES SERIES
Discharge: HOME | End: 2023-11-07
Payer: COMMERCIAL

## 2023-11-07 PROCEDURE — G0283 ELEC STIM OTHER THAN WOUND: HCPCS

## 2023-11-07 PROCEDURE — 97110 THERAPEUTIC EXERCISES: CPT

## 2023-11-07 ASSESSMENT — PAIN SCALES - GENERAL: PAINLEVEL_OUTOF10: 0

## 2023-11-07 NOTE — PROGRESS NOTES
[address] [facility], [address]  ASQZA:[phone]      Physical TherapyTreatment Note        Date: 2023  Patient: Vijay Baumann  : 1955   Confirmed: Yes  MRN: [id number]  Referring Provider: ERICA Dunlap      Medical Diagnosis: Osteoarthritis of spine with radiculopathy, cervical region [M47.22]  Osteoarthritis of spine with radiculopathy, lumbar region [M47.26]      Treatment Diagnosis: L Shoulder pain (healing fracture) with decreased strength and ROM affecting overall functional tolerance. Visit Information:  Insurance: Payor: [employer] / Plan: [employer] / Product Type: *No Product type* /   PT Visit Information  Onset Date: 23  PT Insurance Information: [employer]  Total # of Visits Approved:  (BMN, $40 copay)  Total # of Visits to Date: 5  Plan of Care/Certification Expiration Date: 23  No Show: 0  Progress Note Due Date: 23  Canceled Appointment: 0  Progress Note Counter:  (PN due 23)    Subjective Information:  Subjective: Pt hasn't had any pain meds in at least a week .   HEP Compliance:  [x] Good [] Fair [] Poor [] Reports not doing due to:    Pain Screening  Patient Currently in Pain: Yes  Pain Assessment: 0-10  Pain Level: 0    Treatment:  Exercises:  Exercises  Exercise 1: UT stretch/ levator 3 x30 sec  Exercise 2:  squeeze green x10  Exercise 3: gentle shoulder shrugs/scap retraction x10  Exercise 5: table slides to sondra r flexion [id number]  Exercise 6: gentle PROM seated gentle flexion ER/IR  Exercise 7: pulleys flexion to sondra 1 min       Modalities:  Cryotherapy (CPT 16997)  Patient Position: Seated  Number Minutes Cryotherapy: 10  Cryotherapy location: Left, Shoulder  Cryotherapy specified location: L shoulder  Post treatment skin assessment: Intact  Limitations addressed: Pain modulation  Unbillable time (minutes): 10  Electric stimulation, unattended (CPT 61994) /

## 2023-11-10 PROBLEM — R29.898 LEFT LEG WEAKNESS: Status: RESOLVED | Noted: 2021-03-19 | Resolved: 2023-11-10

## 2023-11-13 ENCOUNTER — TELEPHONE (OUTPATIENT)
Dept: PRIMARY CARE CLINIC | Age: 68
End: 2023-11-13

## 2023-11-13 ENCOUNTER — OFFICE VISIT (OUTPATIENT)
Dept: ORTHOPEDIC SURGERY | Age: 68
End: 2023-11-13

## 2023-11-13 DIAGNOSIS — S42.252D CLOSED DISPLACED FRACTURE OF GREATER TUBEROSITY OF LEFT HUMERUS WITH ROUTINE HEALING, SUBSEQUENT ENCOUNTER: Primary | ICD-10-CM

## 2023-11-13 PROCEDURE — 99024 POSTOP FOLLOW-UP VISIT: CPT | Performed by: PHYSICIAN ASSISTANT

## 2023-11-13 RX ORDER — FLUTICASONE FUROATE, UMECLIDINIUM BROMIDE AND VILANTEROL TRIFENATATE 200; 62.5; 25 UG/1; UG/1; UG/1
1 POWDER RESPIRATORY (INHALATION) DAILY
Qty: 2 EACH | Refills: 0 | COMMUNITY
Start: 2023-11-13

## 2023-11-13 ASSESSMENT — ENCOUNTER SYMPTOMS
GASTROINTESTINAL NEGATIVE: 1
EYES NEGATIVE: 1
RESPIRATORY NEGATIVE: 1

## 2023-11-13 NOTE — TELEPHONE ENCOUNTER
Carrie sees you 12/20/23 and wants to know if you want her to have her A1C checked before coming in to see you. Her blood sugar has been low 53 and low 100's so wants to see if ericu want to have it checked beforehand  Please advise  Takes Ozempic .5 once a week.  Asking can she decrease to .25 instead  Has a doctor appt later today, leave message on the machine for her

## 2023-11-13 NOTE — PROGRESS NOTES
Krystin Garcia (:  1955) is a 77 y.o. female,Established patient, here for evaluation of the following chief complaint(s):  Follow-up (Follow up visit for Closed displaced fracture of greater tuberosity of left humerus , 6 weeks out)         ASSESSMENT/PLAN:  1. Closed displaced fracture of greater tuberosity of left humerus with routine healing, subsequent encounter      No follow-ups on file. Subjective   SUBJECTIVE/OBJECTIVE:  This is 70-year-old right-hand-dominant female following up after left shoulder greater tuberosity fracture sustained 3 months ago. She was in a sling initially for 6 weeks. She has begun physical therapy but is not regained any significant range of motion. He is unable to raise her left arm up to shoulder level. Review of Systems   Constitutional: Negative. HENT: Negative. Eyes: Negative. Respiratory: Negative. Gastrointestinal: Negative. Genitourinary: Negative. Musculoskeletal: Negative. Psychiatric/Behavioral: Negative. Objective   Reading Physician Reading Date Result Priority   Loraine Meigs, MD  [phone] 10/11/2023 Routine     Narrative & Impression  AP left shoulder, AP left glenohumeral joint and scapular Y reveal:  There is an extra-articular fracture of the proximal left humerus. The humeral head is displaced posteriorly and laterally. It appears that the joint is subluxed although it is not dislocated. There is abundant callus formation. No other fractures or dislocations are identified. Physical Exam  Musculoskeletal:      Comments: Left shoulder-no acromioclavicular, clavicle, SC joint tenderness with palpation. Internal rotation is 0 degrees, external rotations 100 degrees about 30 degrees less than the right. Patient is unable to abduct the left arm up to shoulder level secondary to pain. Supraspinatus test elicits painful weakness. Apprehension sign 6 pain.   Biceps contour is normal.  Sensation

## 2023-11-15 DIAGNOSIS — E11.9 TYPE 2 DIABETES MELLITUS WITHOUT COMPLICATION, WITHOUT LONG-TERM CURRENT USE OF INSULIN (HCC): ICD-10-CM

## 2023-11-15 LAB — HBA1C MFR BLD: 6 % (ref 4.8–5.9)

## 2023-11-15 RX ORDER — GLIMEPIRIDE 1 MG/1
1 TABLET ORAL EVERY MORNING
Qty: 90 TABLET | Refills: 3 | Status: SHIPPED | OUTPATIENT
Start: 2023-11-15

## 2023-11-15 RX ORDER — GLIMEPIRIDE 1 MG/1
1 TABLET ORAL EVERY MORNING
Qty: 270 TABLET | Refills: 3 | Status: SHIPPED | OUTPATIENT
Start: 2023-11-15 | End: 2023-11-15 | Stop reason: SDUPTHER

## 2023-11-15 NOTE — TELEPHONE ENCOUNTER
Patient aware. She will be in today to have labs done, she states she was feeling fatigue tremors and dizziness at the time which prompted her to check her sugars, she is fine now. Aware of medication change and to have sugar drinks on hand

## 2023-11-15 NOTE — TELEPHONE ENCOUNTER
A1c ordered    Pls tell her BG of 53 is worrisomely low. Does she get tremors, dizziness, headache or fatigue? Keep sugar drinks handly at all times in case this happens. As such I have decreased glimepiride dose to 1mg daily, no more 3mg daily. She can let me know in a few days how that works.

## 2023-11-17 ENCOUNTER — OFFICE VISIT (OUTPATIENT)
Dept: NEUROLOGY | Age: 68
End: 2023-11-17
Payer: COMMERCIAL

## 2023-11-17 VITALS
WEIGHT: 293 LBS | BODY MASS INDEX: 48.55 KG/M2 | HEART RATE: 83 BPM | SYSTOLIC BLOOD PRESSURE: 120 MMHG | DIASTOLIC BLOOD PRESSURE: 62 MMHG

## 2023-11-17 DIAGNOSIS — H51.8 DYSCONJUGATE GAZE: ICD-10-CM

## 2023-11-17 DIAGNOSIS — F22 DELUSIONS (HCC): ICD-10-CM

## 2023-11-17 DIAGNOSIS — G54.0 BRACHIAL PLEXOPATHY: ICD-10-CM

## 2023-11-17 DIAGNOSIS — R53.1 WEAKNESS: ICD-10-CM

## 2023-11-17 DIAGNOSIS — R25.3 TWITCHING: Primary | ICD-10-CM

## 2023-11-17 PROCEDURE — 1123F ACP DISCUSS/DSCN MKR DOCD: CPT | Performed by: PSYCHIATRY & NEUROLOGY

## 2023-11-17 PROCEDURE — 99214 OFFICE O/P EST MOD 30 MIN: CPT | Performed by: PSYCHIATRY & NEUROLOGY

## 2023-11-17 ASSESSMENT — ENCOUNTER SYMPTOMS
SHORTNESS OF BREATH: 0
VOMITING: 0
COLOR CHANGE: 0
PHOTOPHOBIA: 0
TROUBLE SWALLOWING: 0
NAUSEA: 0
BACK PAIN: 0
CHOKING: 0

## 2023-11-17 NOTE — PROGRESS NOTES
Subjective:      Patient ID: Joe Sethi is a 77 y.o. female who presents today for:  Chief Complaint   Patient presents with    New Patient     Pt states her shoulder hurts. Pt. Ute Coulterner her shoulder 8/13/23. Pt states she been having twitches. Twitches been going on for about 6 months. Pt states she been having double vision        HPI 59-year-old right-handed female referred here for visual hallucinations. Patient was seen by other providers as I have not seen the patient before. Patient symptoms were quite noted on hallucinations and delusions while watching television. Patient has history of sleep apnea. Patients shoulder injury August 13 and is having some twitches    Questioning she reports that she has twitching of her fingers when she scratches just above her ear on the left it does not occur in any other situation. Patient is being evaluated by orthopedics for a possible rotator cuff and has an MRI of the shoulder pending. Patient still has some delusions and very specifically tells me that sometimes he sees a friend in the crowd but the friend has been had for years. Otherwise he is not by any other symptoms.   Past Medical History:   Diagnosis Date    Asthma     COPD (chronic obstructive pulmonary disease) ([address] [facility])     Depression     Diabetes mellitus ([address] [facility])     Sleep apnea      Past Surgical History:   Procedure Laterality Date    CHOLECYSTECTOMY      COLONOSCOPY N/A 4/19/2021    COLORECTAL CANCER SCREENING with polypectomies performed by Renford Ormond, MD at [address] [place] [address] History    Marital status:      Spouse name: Not on file    Number of children: Not on file    Years of education: Not on file    Highest education level: Not on file   Occupational History    Not on file   Tobacco Use    Smoking status: Former     Packs/day: 1.00     Years: 40.00     Additional pack years: 0.00     Total pack years: 40.00     Types: Cigarettes     Start date: 24

## 2023-11-22 ENCOUNTER — HOSPITAL ENCOUNTER (OUTPATIENT)
Dept: MRI IMAGING | Age: 68
Discharge: HOME | End: 2023-11-24
Payer: COMMERCIAL

## 2023-11-22 DIAGNOSIS — S42.252D CLOSED DISPLACED FRACTURE OF GREATER TUBEROSITY OF LEFT HUMERUS WITH ROUTINE HEALING, SUBSEQUENT ENCOUNTER: ICD-10-CM

## 2023-11-22 PROCEDURE — 73221 MRI JOINT UPR EXTREM W/O DYE: CPT

## 2023-11-30 ENCOUNTER — OFFICE VISIT (OUTPATIENT)
Dept: ORTHOPEDIC SURGERY | Age: 68
End: 2023-11-30
Payer: COMMERCIAL

## 2023-11-30 VITALS
HEART RATE: 90 BPM | OXYGEN SATURATION: 97 % | HEIGHT: 66 IN | WEIGHT: 293 LBS | TEMPERATURE: 97.7 F | BODY MASS INDEX: 47.09 KG/M2

## 2023-11-30 DIAGNOSIS — M12.812 ROTATOR CUFF TEAR ARTHROPATHY OF LEFT SHOULDER: ICD-10-CM

## 2023-11-30 DIAGNOSIS — M75.102 ROTATOR CUFF TEAR ARTHROPATHY OF LEFT SHOULDER: ICD-10-CM

## 2023-11-30 DIAGNOSIS — S42.252D CLOSED DISPLACED FRACTURE OF GREATER TUBEROSITY OF LEFT HUMERUS WITH ROUTINE HEALING, SUBSEQUENT ENCOUNTER: Primary | ICD-10-CM

## 2023-11-30 PROCEDURE — 1123F ACP DISCUSS/DSCN MKR DOCD: CPT | Performed by: PHYSICIAN ASSISTANT

## 2023-11-30 PROCEDURE — 99214 OFFICE O/P EST MOD 30 MIN: CPT | Performed by: PHYSICIAN ASSISTANT

## 2023-11-30 ASSESSMENT — ENCOUNTER SYMPTOMS
RESPIRATORY NEGATIVE: 1
GASTROINTESTINAL NEGATIVE: 1
EYES NEGATIVE: 1

## 2023-11-30 NOTE — PROGRESS NOTES
Eloy Wade (:  1955) is a 77 y.o. female,Established patient, here for evaluation of the following chief complaint(s):  Follow-up (Pt here for f/u after MRI)         ASSESSMENT/PLAN:  1. Closed displaced fracture of greater tuberosity of left humerus with routine healing, subsequent encounter  2. Rotator cuff tear arthropathy of left shoulder      No follow-ups on file. Subjective   SUBJECTIVE/OBJECTIVE:  This is 68-year-old right-hand-dominant female following up for complaint of left shoulder pain secondary to fracture of the left proximal humerus. She has had an MRI scan of the shoulder looking for rotator cuff tearing and is here for results. She states she is not using the arm much. She states she has difficulty raising the arm up above shoulder level. She is not taking pain medication. She has attempted physical therapy with limited improvement in her range of motion. Review of Systems   Constitutional: Negative. HENT: Negative. Eyes: Negative. Respiratory: Negative. Gastrointestinal: Negative. Genitourinary: Negative. Musculoskeletal: Negative. Psychiatric/Behavioral: Negative. Objective   Abdulaziz Valladares MD  [phone] 2023      Narrative & Impression  EXAMINATION:  MRI OF THE LEFT SHOULDER WITHOUT CONTRAST   2023 11:19 am     TECHNIQUE:  Multiplanar multisequence MRI of the left shoulder was performed without the  administration of intravenous contrast.     COMPARISON:  None. HISTORY:  ORDERING SYSTEM PROVIDED HISTORY: Closed displaced fracture of greater  tuberosity of left humerus with routine healing, subsequent encounter  TECHNOLOGIST PROVIDED HISTORY:  Reason for exam:->Fracture of the proximal humerus. Patient unable to abduct  the arm up to shoulder level. What is the sedation requirement?->None  What reading provider will be dictating this exam?->CRC     FINDINGS:  ROTATOR CUFF: Somewhat poor evaluation.   There

## 2023-12-04 RX ORDER — FLUTICASONE FUROATE, UMECLIDINIUM BROMIDE AND VILANTEROL TRIFENATATE 200; 62.5; 25 UG/1; UG/1; UG/1
1 POWDER RESPIRATORY (INHALATION) DAILY
Qty: 2 EACH | Refills: 0 | Status: SHIPPED | OUTPATIENT
Start: 2023-12-04

## 2023-12-06 ENCOUNTER — OFFICE VISIT (OUTPATIENT)
Dept: PRIMARY CARE CLINIC | Age: 68
End: 2023-12-06
Payer: COMMERCIAL

## 2023-12-06 VITALS
WEIGHT: 293 LBS | SYSTOLIC BLOOD PRESSURE: 118 MMHG | BODY MASS INDEX: 47.09 KG/M2 | OXYGEN SATURATION: 97 % | HEIGHT: 66 IN | DIASTOLIC BLOOD PRESSURE: 74 MMHG | RESPIRATION RATE: 18 BRPM | HEART RATE: 92 BPM | TEMPERATURE: 97.3 F

## 2023-12-06 DIAGNOSIS — E11.40 TYPE 2 DIABETES MELLITUS WITH DIABETIC NEUROPATHY, WITHOUT LONG-TERM CURRENT USE OF INSULIN (HCC): Primary | ICD-10-CM

## 2023-12-06 DIAGNOSIS — E16.2 HYPOGLYCEMIA: ICD-10-CM

## 2023-12-06 DIAGNOSIS — Z00.00 HEALTH CARE MAINTENANCE: ICD-10-CM

## 2023-12-06 PROCEDURE — 1123F ACP DISCUSS/DSCN MKR DOCD: CPT | Performed by: INTERNAL MEDICINE

## 2023-12-06 PROCEDURE — 99214 OFFICE O/P EST MOD 30 MIN: CPT | Performed by: INTERNAL MEDICINE

## 2023-12-06 PROCEDURE — 90694 VACC AIIV4 NO PRSRV 0.5ML IM: CPT | Performed by: INTERNAL MEDICINE

## 2023-12-06 PROCEDURE — 3044F HG A1C LEVEL LT 7.0%: CPT | Performed by: INTERNAL MEDICINE

## 2023-12-06 PROCEDURE — G0008 ADMIN INFLUENZA VIRUS VAC: HCPCS | Performed by: INTERNAL MEDICINE

## 2023-12-06 RX ORDER — SEMAGLUTIDE 0.68 MG/ML
0.25 INJECTION, SOLUTION SUBCUTANEOUS WEEKLY
Qty: 3 ML | Refills: 5 | Status: SHIPPED | OUTPATIENT
Start: 2023-12-06

## 2023-12-06 ASSESSMENT — ENCOUNTER SYMPTOMS
NAUSEA: 0
DIARRHEA: 0
ABDOMINAL PAIN: 0
VOMITING: 0
WHEEZING: 0
SHORTNESS OF BREATH: 0
COUGH: 0

## 2023-12-06 NOTE — PROGRESS NOTES
Subjective:      Patient ID: Betty May is a 77 y.o. female    Follow up   HPI  Pt presents for follow up regarding T2DM. -200s. Compliant with metformin, amaryl and Ozempic. Attests to increased frequency of tremors, sweats, weakness, blurry vision. BG during those episodes in the 70s. Hemoglobin A1C (%)   Date Value   11/15/2023 6.0 (H)   2023 7.5 (H)   2023 7.5 (H)   2022 7.2 (H)   2022 7.3 (H)     Past Medical History:   Diagnosis Date    Asthma     COPD (chronic obstructive pulmonary disease) ([address] [facility])     Depression     Diabetes mellitus ([facility])     Sleep apnea      Past Surgical History:   Procedure Laterality Date    CHOLECYSTECTOMY      COLONOSCOPY N/A 2021    COLORECTAL CANCER SCREENING with polypectomies performed by Efra Quezada MD at [address] [place] [address]    Marital status:      Spouse name: Not on file    Number of children: Not on file    Years of education: Not on file    Highest education level: Not on file   Occupational History    Not on file   Tobacco Use    Smoking status: Former     Packs/day: 1.00     Years: 40.00     Additional pack years: 0.00     Total pack years: 40.00     Types: Cigarettes     Start date:      Quit date:      Years since quittin.9    Smokeless tobacco: Former     Quit date:    Vaping Use    Vaping Use: Never used   Substance and Sexual Activity    Alcohol use: Not Currently     Comment: occass    Drug use: Never    Sexual activity: Not on file   Other Topics Concern    Not on file   Social History Narrative    Not on file     Social Determinants of Health     Financial Resource Strain: Low Risk  (2023)    Overall Financial Resource Strain (CARDIA)     Difficulty of Paying Living Expenses: Not hard at all   Food Insecurity: Not on file (2023)   Transportation Needs: Unknown (2023)    PRAPARE - Transportation     Lack of Transportation (Medical):  Not on

## 2023-12-06 NOTE — PROGRESS NOTES
Patient presents today for Influenza vaccine    Have you ever had a reaction to a flu vaccine? no  Are you able to eat eggs without adverse effects? no  Do you have any current illness? no  Have you ever had Guillian [place] Syndrome? no    Flu vaccine given per order. Please see immunization tab. Patient tolerated well. Information sheet given.

## 2023-12-21 ENCOUNTER — TELEPHONE (OUTPATIENT)
Dept: ORTHOPEDIC SURGERY | Age: 68
End: 2023-12-21

## 2023-12-21 NOTE — TELEPHONE ENCOUNTER
Patient called in stating the company you recommended to her for a shoulder immobilizer does not carry this product, so she is unsure where she should get one from. I informed her I would give you the update so other options can be discussed at her appointment on 12/22/23.   no

## 2023-12-22 ENCOUNTER — HOSPITAL ENCOUNTER (OUTPATIENT)
Dept: ORTHOPEDIC SURGERY | Age: 68
Discharge: HOME | End: 2023-12-24
Payer: COMMERCIAL

## 2023-12-22 ENCOUNTER — OFFICE VISIT (OUTPATIENT)
Dept: ORTHOPEDIC SURGERY | Age: 68
End: 2023-12-22
Payer: COMMERCIAL

## 2023-12-22 DIAGNOSIS — M12.812 ROTATOR CUFF TEAR ARTHROPATHY OF LEFT SHOULDER: ICD-10-CM

## 2023-12-22 DIAGNOSIS — S42.292A OTHER CLOSED DISPLACED FRACTURE OF PROXIMAL END OF LEFT HUMERUS, INITIAL ENCOUNTER: ICD-10-CM

## 2023-12-22 DIAGNOSIS — M12.812 ROTATOR CUFF TEAR ARTHROPATHY OF LEFT SHOULDER: Primary | ICD-10-CM

## 2023-12-22 DIAGNOSIS — M75.102 ROTATOR CUFF TEAR ARTHROPATHY OF LEFT SHOULDER: Primary | ICD-10-CM

## 2023-12-22 DIAGNOSIS — M75.102 ROTATOR CUFF TEAR ARTHROPATHY OF LEFT SHOULDER: ICD-10-CM

## 2023-12-22 PROCEDURE — 99214 OFFICE O/P EST MOD 30 MIN: CPT | Performed by: STUDENT IN AN ORGANIZED HEALTH CARE EDUCATION/TRAINING PROGRAM

## 2023-12-22 PROCEDURE — 1123F ACP DISCUSS/DSCN MKR DOCD: CPT | Performed by: STUDENT IN AN ORGANIZED HEALTH CARE EDUCATION/TRAINING PROGRAM

## 2023-12-22 PROCEDURE — 73030 X-RAY EXAM OF SHOULDER: CPT

## 2023-12-22 NOTE — PROGRESS NOTES
Orthopedic Surgery and Sports Medicine    Subjective:      Patient ID: Renato Dailey is a 77 y.o. female who presents today for:  Chief Complaint   Patient presents with    Follow-up     Follow up visit for LT Shoulder and consult for procedure       HPI  Jia Bradford is a 64-year-old female presents today for evaluation of her left shoulder pain. She had an injury to her shoulder on 2023 when she sustained a left proximal humerus fracture. She has been following with Rajwinder Decker PA-C. She has been treated nonoperatively with physical therapy. She has continued to have pain in the shoulder as well as limited function despite treatment with physical therapy. She is not able to lift the left arm.       Past Medical History:   Diagnosis Date    Asthma     COPD (chronic obstructive pulmonary disease) ([address])     Depression     Diabetes mellitus ([address])     Sleep apnea       Past Surgical History:   Procedure Laterality Date    CHOLECYSTECTOMY      COLONOSCOPY N/A 2021    COLORECTAL CANCER SCREENING with polypectomies performed by Juvenal Owusu MD at [address] [place] [address] History    Marital status:      Spouse name: Not on file    Number of children: Not on file    Years of education: Not on file    Highest education level: Not on file   Occupational History    Not on file   Tobacco Use    Smoking status: Former     Current packs/day: 0.00     Average packs/day: 1 pack/day for 40.0 years (40.0 ttl pk-yrs)     Types: Cigarettes     Start date:      Quit date:      Years since quittin.9    Smokeless tobacco: Former     Quit date:    Vaping Use    Vaping Use: Never used   Substance and Sexual Activity    Alcohol use: Not Currently     Comment: occass    Drug use: Never    Sexual activity: Not on file   Other Topics Concern    Not on file   Social History Narrative    Not on file     Social Determinants of Health     Financial Resource Strain: Low Risk

## 2024-01-02 SDOH — HEALTH STABILITY: PHYSICAL HEALTH: ON AVERAGE, HOW MANY DAYS PER WEEK DO YOU ENGAGE IN MODERATE TO STRENUOUS EXERCISE (LIKE A BRISK WALK)?: 0 DAYS

## 2024-01-02 SDOH — HEALTH STABILITY: PHYSICAL HEALTH: ON AVERAGE, HOW MANY MINUTES DO YOU ENGAGE IN EXERCISE AT THIS LEVEL?: 0 MIN

## 2024-01-02 ASSESSMENT — PATIENT HEALTH QUESTIONNAIRE - PHQ9
SUM OF ALL RESPONSES TO PHQ9 QUESTIONS 1 & 2: 1
SUM OF ALL RESPONSES TO PHQ QUESTIONS 1-9: 1
1. LITTLE INTEREST OR PLEASURE IN DOING THINGS: 1
2. FEELING DOWN, DEPRESSED OR HOPELESS: 0
SUM OF ALL RESPONSES TO PHQ QUESTIONS 1-9: 1

## 2024-01-02 ASSESSMENT — LIFESTYLE VARIABLES
HOW MANY STANDARD DRINKS CONTAINING ALCOHOL DO YOU HAVE ON A TYPICAL DAY: 0
HOW MANY STANDARD DRINKS CONTAINING ALCOHOL DO YOU HAVE ON A TYPICAL DAY: PATIENT DOES NOT DRINK
HOW OFTEN DO YOU HAVE SIX OR MORE DRINKS ON ONE OCCASION: 1
HOW OFTEN DO YOU HAVE A DRINK CONTAINING ALCOHOL: 2
HOW OFTEN DO YOU HAVE A DRINK CONTAINING ALCOHOL: MONTHLY OR LESS

## 2024-01-03 ENCOUNTER — OFFICE VISIT (OUTPATIENT)
Dept: PRIMARY CARE CLINIC | Age: 69
End: 2024-01-03
Payer: COMMERCIAL

## 2024-01-03 VITALS
HEART RATE: 83 BPM | OXYGEN SATURATION: 95 % | HEIGHT: 66 IN | OXYGEN SATURATION: 95 % | WEIGHT: 293 LBS | BODY MASS INDEX: 47.09 KG/M2 | RESPIRATION RATE: 18 BRPM | HEIGHT: 66 IN | RESPIRATION RATE: 18 BRPM | DIASTOLIC BLOOD PRESSURE: 80 MMHG | HEART RATE: 83 BPM | BODY MASS INDEX: 47.09 KG/M2 | WEIGHT: 293 LBS | SYSTOLIC BLOOD PRESSURE: 110 MMHG | DIASTOLIC BLOOD PRESSURE: 80 MMHG | SYSTOLIC BLOOD PRESSURE: 110 MMHG

## 2024-01-03 DIAGNOSIS — E16.2 HYPOGLYCEMIA: ICD-10-CM

## 2024-01-03 DIAGNOSIS — Z87.891 PERSONAL HISTORY OF TOBACCO USE: Primary | ICD-10-CM

## 2024-01-03 DIAGNOSIS — E11.40 TYPE 2 DIABETES MELLITUS WITH DIABETIC NEUROPATHY, WITHOUT LONG-TERM CURRENT USE OF INSULIN (HCC): Primary | ICD-10-CM

## 2024-01-03 DIAGNOSIS — E11.40 TYPE 2 DIABETES MELLITUS WITH DIABETIC NEUROPATHY, WITHOUT LONG-TERM CURRENT USE OF INSULIN (HCC): ICD-10-CM

## 2024-01-03 DIAGNOSIS — R20.2 PARESTHESIA OF FOOT, BILATERAL: ICD-10-CM

## 2024-01-03 PROCEDURE — 99214 OFFICE O/P EST MOD 30 MIN: CPT | Performed by: INTERNAL MEDICINE

## 2024-01-03 PROCEDURE — G0439 PPPS, SUBSEQ VISIT: HCPCS | Performed by: INTERNAL MEDICINE

## 2024-01-03 PROCEDURE — 1123F ACP DISCUSS/DSCN MKR DOCD: CPT | Performed by: INTERNAL MEDICINE

## 2024-01-03 PROCEDURE — G0296 VISIT TO DETERM LDCT ELIG: HCPCS | Performed by: INTERNAL MEDICINE

## 2024-01-03 RX ORDER — GABAPENTIN 100 MG/1
100 CAPSULE ORAL 3 TIMES DAILY
Qty: 270 CAPSULE | Refills: 3 | Status: SHIPPED | OUTPATIENT
Start: 2024-01-03 | End: 2024-04-02

## 2024-01-03 ASSESSMENT — ENCOUNTER SYMPTOMS
DIARRHEA: 0
ABDOMINAL PAIN: 0
SHORTNESS OF BREATH: 0
VOMITING: 0
WHEEZING: 0
COUGH: 0
NAUSEA: 0

## 2024-01-03 ASSESSMENT — PATIENT HEALTH QUESTIONNAIRE - PHQ9
9. THOUGHTS THAT YOU WOULD BE BETTER OFF DEAD, OR OF HURTING YOURSELF: 0
SUM OF ALL RESPONSES TO PHQ QUESTIONS 1-9: 0
SUM OF ALL RESPONSES TO PHQ QUESTIONS 1-9: 0
4. FEELING TIRED OR HAVING LITTLE ENERGY: 0
1. LITTLE INTEREST OR PLEASURE IN DOING THINGS: 0
SUM OF ALL RESPONSES TO PHQ QUESTIONS 1-9: 0
SUM OF ALL RESPONSES TO PHQ QUESTIONS 1-9: 0
3. TROUBLE FALLING OR STAYING ASLEEP: 0
10. IF YOU CHECKED OFF ANY PROBLEMS, HOW DIFFICULT HAVE THESE PROBLEMS MADE IT FOR YOU TO DO YOUR WORK, TAKE CARE OF THINGS AT HOME, OR GET ALONG WITH OTHER PEOPLE: 0
7. TROUBLE CONCENTRATING ON THINGS, SUCH AS READING THE NEWSPAPER OR WATCHING TELEVISION: 0
6. FEELING BAD ABOUT YOURSELF - OR THAT YOU ARE A FAILURE OR HAVE LET YOURSELF OR YOUR FAMILY DOWN: 0
8. MOVING OR SPEAKING SO SLOWLY THAT OTHER PEOPLE COULD HAVE NOTICED. OR THE OPPOSITE, BEING SO FIGETY OR RESTLESS THAT YOU HAVE BEEN MOVING AROUND A LOT MORE THAN USUAL: 0
SUM OF ALL RESPONSES TO PHQ9 QUESTIONS 1 & 2: 0
2. FEELING DOWN, DEPRESSED OR HOPELESS: 0
5. POOR APPETITE OR OVEREATING: 0

## 2024-01-03 NOTE — PATIENT INSTRUCTIONS
and not another kind of pain reliever, such as acetaminophen (Tylenol).   When should you call for help?   Call 911 if you have symptoms of a heart attack. These may include:    Chest pain or pressure, or a strange feeling in the chest.     Sweating.     Shortness of breath.     Pain, pressure, or a strange feeling in the back, neck, jaw, or upper belly or in one or both shoulders or arms.     Lightheadedness or sudden weakness.     A fast or irregular heartbeat.   After you call 911, the  may tell you to chew 1 adult-strength or 2 to 4 low-dose aspirin. Wait for an ambulance. Do not try to drive yourself.  Watch closely for changes in your health, and be sure to contact your doctor if you have any problems.  Where can you learn more?  Go to https://www.[employer].net/patientEd and enter F075 to learn more about \"A Healthy Heart: Care Instructions.\"  Current as of: June 25, 2023               Content Version: 13.9  © 2006-2023 [employer].   Care instructions adapted under license by [employer]. If you have questions about a medical condition or this instruction, always ask your healthcare professional. [employer] disclaims any warranty or liability for your use of this information.      Personalized Preventive Plan for Carrie Clark - 1/3/2024  Medicare offers a range of preventive health benefits. Some of the tests and screenings are paid in full while other may be subject to a deductible, co-insurance, and/or copay.    Some of these benefits include a comprehensive review of your medical history including lifestyle, illnesses that may run in your family, and various assessments and screenings as appropriate.    After reviewing your medical record and screening and assessments performed today your provider may have ordered immunizations, labs, imaging, and/or referrals for you.  A list of these orders (if applicable) as well as your Preventive Care list are included within

## 2024-01-03 NOTE — PROGRESS NOTES
Subjective:      Patient ID: Carrie Clark is a 68 y.o. female    T2DM f/u   HPI  Pt presents for follow up regarding T2DM. -200s.Compliant with metformin, amaryl and Ozempic. Hypoglycemia resolved upon reduced Ozempic and Amaryl doses.        Seen by neuro for  memory loss and twitches. No dementia confirmed. EMG pending.   Hemoglobin A1C (%)   Date Value   11/15/2023 6.0 (H)   2023 7.5 (H)   2023 7.5 (H)   2022 7.2 (H)   2022 7.3 (H)     Toe numbness x 1 month. Hx diabetic neuropathy, unaware of gabapentin.    Past Medical History:   Diagnosis Date    Asthma     COPD (chronic obstructive pulmonary disease) ([facility])     Depression     Diabetes mellitus (HCC)     Sleep apnea      Past Surgical History:   Procedure Laterality Date    CHOLECYSTECTOMY      COLONOSCOPY N/A 2021    COLORECTAL CANCER SCREENING with polypectomies performed by Leo Moreau MD at [facility]     Social History     Socioeconomic History    Marital status:      Spouse name: Not on file    Number of children: Not on file    Years of education: Not on file    Highest education level: Not on file   Occupational History    Not on file   Tobacco Use    Smoking status: Former     Current packs/day: 0.00     Average packs/day: 1 pack/day for 40.0 years (40.0 ttl pk-yrs)     Types: Cigarettes     Start date:      Quit date:      Years since quittin.0    Smokeless tobacco: Former     Quit date:    Vaping Use    Vaping Use: Never used   Substance and Sexual Activity    Alcohol use: Not Currently     Comment: occass    Drug use: Never    Sexual activity: Not Currently   Other Topics Concern    Not on file   Social History Narrative    Not on file     Social Determinants of Health     Financial Resource Strain: Low Risk  (2023)    Overall Financial Resource Strain (CARDIA)     Difficulty of Paying Living Expenses: Not hard at all   Food Insecurity: Not on file (2023)

## 2024-01-03 NOTE — PROGRESS NOTES
Medicare Annual Wellness Visit    Carrie Clark is here for Medicare AWV (Patient presents today for AWV)    Assessment & Plan   Personal history of tobacco use  -     CT VISIT TO DISCUSS LUNG CA SCREEN W LDCT    Recommendations for Preventive Services Due: see orders and patient instructions/AVS.  Recommended screening schedule for the next 5-10 years is provided to the patient in written form: see Patient Instructions/AVS.     No follow-ups on file.     Subjective       Patient's complete Health Risk Assessment and screening values have been reviewed and are found in Flowsheets. The following problems were reviewed today and where indicated follow up appointments were made and/or referrals ordered.    Positive Risk Factor Screenings with Interventions:    Fall Risk:  Do you feel unsteady or are you worried about falling? : (!) yes  2 or more falls in past year?: no  Fall with injury in past year?: (!) yes     Interventions:    Reviewed medications, home hazards, visual acuity, and co-morbidities that can increase risk for falls  One fall 5 months ago. Last one before this was 2 yrs ago          Controlled Medication Review:      Today's Pain Level: No data recorded   Opioid Risk: (Low risk score <55) Opioid risk score: 10    Patient is low risk for opioid use disorder or overdose.    Last PDMP [place] as Reviewed:  Review User Review Instant Review Result   SAVITA CABRERA MARIO 3/21/2021 12:37 PM     Reviewed PDMP [1]       General HRA Questions:  Select all that apply: (!) New or Increased Pain    Pain Interventions:  Patient comments: shoulder pain. Planned for surgery this month       Activity, Diet, and Weight:  On average, how many days per week do you engage in moderate to strenuous exercise (like a brisk walk)?: 0 days  On average, how many minutes do you engage in exercise at this level?: 0 min    Do you eat balanced/healthy meals regularly?: Yes    Body mass index is 48.35 kg/m². (!) Abnormal    Inactivity

## 2024-01-07 LAB — GAD65 AB SER IA-ACNC: <5 IU/ML (ref 0–5)

## 2024-01-08 ENCOUNTER — TELEPHONE (OUTPATIENT)
Dept: ORTHOPEDIC SURGERY | Age: 69
End: 2024-01-08

## 2024-01-08 ENCOUNTER — OFFICE VISIT (OUTPATIENT)
Dept: PULMONOLOGY | Age: 69
End: 2024-01-08
Payer: COMMERCIAL

## 2024-01-08 VITALS
HEIGHT: 66 IN | WEIGHT: 293 LBS | RESPIRATION RATE: 16 BRPM | HEART RATE: 95 BPM | SYSTOLIC BLOOD PRESSURE: 116 MMHG | TEMPERATURE: 97.4 F | DIASTOLIC BLOOD PRESSURE: 64 MMHG | BODY MASS INDEX: 47.09 KG/M2 | OXYGEN SATURATION: 95 %

## 2024-01-08 DIAGNOSIS — Z87.891 HISTORY OF SMOKING: ICD-10-CM

## 2024-01-08 DIAGNOSIS — I51.89 DIASTOLIC DYSFUNCTION: ICD-10-CM

## 2024-01-08 DIAGNOSIS — E66.01 CLASS 3 SEVERE OBESITY DUE TO EXCESS CALORIES WITH SERIOUS COMORBIDITY AND BODY MASS INDEX (BMI) OF 50.0 TO 59.9 IN ADULT (HCC): ICD-10-CM

## 2024-01-08 DIAGNOSIS — J44.9 ASTHMA-COPD OVERLAP SYNDROME: ICD-10-CM

## 2024-01-08 DIAGNOSIS — Z87.891 PERSONAL HISTORY OF TOBACCO USE: ICD-10-CM

## 2024-01-08 DIAGNOSIS — G47.33 OSA (OBSTRUCTIVE SLEEP APNEA): Primary | ICD-10-CM

## 2024-01-08 PROCEDURE — G0296 VISIT TO DETERM LDCT ELIG: HCPCS | Performed by: INTERNAL MEDICINE

## 2024-01-08 PROCEDURE — 99213 OFFICE O/P EST LOW 20 MIN: CPT | Performed by: INTERNAL MEDICINE

## 2024-01-08 PROCEDURE — 1123F ACP DISCUSS/DSCN MKR DOCD: CPT | Performed by: INTERNAL MEDICINE

## 2024-01-08 NOTE — TELEPHONE ENCOUNTER
Patient came in to get radiologist report of XR left shoulder and it states that this is not final yet. I am sending to Dr. Calderon to review.

## 2024-01-08 NOTE — PROGRESS NOTES
Subjective:     Carrie Clark is a 68 y.o. female who complains today of:     Chief Complaint   Patient presents with    Follow-up     5 Month F/U for Asthma-COPD overlap syndrome and FROYLAN on CPAP       HPI  Patient presents for asthma COPD overlap syndrome  1/8/2024  Presents for follow-up, she is doing good, no shortness of breath, no chest pain, symptoms controlled currently on Trelegy, no fever no chills weight is stable, no lower extremity edema, she uses CPAP every night with no issues.      8/8/2023  Doing good, symptoms controlled, she is currently on Trelegy and Arnuity, no shortness of breath, no coughing, no chest pain, no lower extremity edema, she is on Ozempic and losing weight, no fever no chills, no nasal congestion or postnasal drip and no heartburn.  She uses her CPAP every night, the device is old, more than 5 years, and the humidifier not working well.  No daytime sleepiness or tiredness, I have no access to her compliance report.        2/7/2023  Symptoms controlled, she has dyspnea mainly on exertion, minimal coughing, no chest pain, weight is stable, no lower extremity edema, no fever no chills, no nasal congestion or postnasal drip and no heartburn.  She is currently on Trelegy and Singulair, she has not started Arnuity yet  She uses CPAP every day.      Allergies:  Budesonide-formoterol fumarate  Past Medical History:   Diagnosis Date    Asthma     COPD (chronic obstructive pulmonary disease) (HCC)     Depression     Diabetes mellitus (HCC)     Sleep apnea      Past Surgical History:   Procedure Laterality Date    CHOLECYSTECTOMY      COLONOSCOPY N/A 4/19/2021    COLORECTAL CANCER SCREENING with polypectomies performed by Leo Moreau MD at [facility]     Family History   Problem Relation Age of Onset    Breast Cancer Mother     Colon Cancer Neg Hx      Social History     Socioeconomic History    Marital status:      Spouse name: Not on file    Number of children: Not on

## 2024-01-16 ENCOUNTER — TELEPHONE (OUTPATIENT)
Dept: PRIMARY CARE CLINIC | Age: 69
End: 2024-01-16

## 2024-01-16 DIAGNOSIS — E11.9 TYPE 2 DIABETES MELLITUS WITHOUT COMPLICATION, WITHOUT LONG-TERM CURRENT USE OF INSULIN (HCC): ICD-10-CM

## 2024-01-16 NOTE — TELEPHONE ENCOUNTER
Pt needs a new glucose monitor,     Her old one broke and she states it cannot read her sugar levels anymore    Drug [place]

## 2024-01-23 ENCOUNTER — HOSPITAL ENCOUNTER (OUTPATIENT)
Dept: CT IMAGING | Age: 69
Discharge: HOME | End: 2024-01-25
Payer: COMMERCIAL

## 2024-01-23 ENCOUNTER — TELEPHONE (OUTPATIENT)
Dept: PULMONOLOGY | Age: 69
End: 2024-01-23

## 2024-01-23 DIAGNOSIS — Z87.891 PERSONAL HISTORY OF TOBACCO USE: ICD-10-CM

## 2024-01-23 PROCEDURE — 71271 CT THORAX LUNG CANCER SCR C-: CPT

## 2024-01-23 NOTE — TELEPHONE ENCOUNTER
I forwarded the results to the patient, Azalea could you please let her know CT scan shows no concerning abnormality, I will review images with her when I see her back

## 2024-01-23 NOTE — TELEPHONE ENCOUNTER
PATIENT WANTED YOU TO KNOW THAT SHE DID HER CT SCAN TODAY AND SHE WOULD LIKE YOU TO CALL HER WHEN YOU GET RESULTS. SHE IS ALSO HAVING SOME SHOULDER SURGERY AND WANTS TO TALK TO YOU ABOUT THIS ALSO.

## 2024-02-05 ENCOUNTER — OFFICE VISIT (OUTPATIENT)
Dept: PULMONOLOGY | Age: 69
End: 2024-02-05
Payer: COMMERCIAL

## 2024-02-05 VITALS
RESPIRATION RATE: 18 BRPM | HEIGHT: 66 IN | HEART RATE: 87 BPM | DIASTOLIC BLOOD PRESSURE: 70 MMHG | WEIGHT: 293 LBS | BODY MASS INDEX: 47.09 KG/M2 | TEMPERATURE: 98.1 F | OXYGEN SATURATION: 99 % | SYSTOLIC BLOOD PRESSURE: 128 MMHG

## 2024-02-05 DIAGNOSIS — I51.89 DIASTOLIC DYSFUNCTION: ICD-10-CM

## 2024-02-05 DIAGNOSIS — J44.89 ASTHMA-COPD OVERLAP SYNDROME: ICD-10-CM

## 2024-02-05 DIAGNOSIS — E66.01 CLASS 3 SEVERE OBESITY DUE TO EXCESS CALORIES WITH SERIOUS COMORBIDITY AND BODY MASS INDEX (BMI) OF 50.0 TO 59.9 IN ADULT (HCC): ICD-10-CM

## 2024-02-05 DIAGNOSIS — G47.33 OSA (OBSTRUCTIVE SLEEP APNEA): Primary | ICD-10-CM

## 2024-02-05 PROCEDURE — 99214 OFFICE O/P EST MOD 30 MIN: CPT | Performed by: INTERNAL MEDICINE

## 2024-02-05 PROCEDURE — 1123F ACP DISCUSS/DSCN MKR DOCD: CPT | Performed by: INTERNAL MEDICINE

## 2024-02-05 NOTE — PROGRESS NOTES
Diabetes mellitus (HCC)     Sleep apnea      Past Surgical History:   Procedure Laterality Date    CHOLECYSTECTOMY      COLONOSCOPY N/A 2021    COLORECTAL CANCER SCREENING with polypectomies performed by Leo Moreau MD at [facility]     Family History   Problem Relation Age of Onset    Breast Cancer Mother     Colon Cancer Neg Hx      Social History     Socioeconomic History    Marital status:      Spouse name: Not on file    Number of children: Not on file    Years of education: Not on file    Highest education level: Not on file   Occupational History    Not on file   Tobacco Use    Smoking status: Former     Current packs/day: 0.00     Average packs/day: 1 pack/day for 40.0 years (40.0 ttl pk-yrs)     Types: Cigarettes     Start date:      Quit date:      Years since quittin.1    Smokeless tobacco: Former     Quit date:    Vaping Use    Vaping Use: Never used   Substance and Sexual Activity    Alcohol use: Not Currently     Comment: occass    Drug use: Never    Sexual activity: Not Currently   Other Topics Concern    Not on file   Social History Narrative    Not on file     Social Determinants of Health     Financial Resource Strain: Low Risk  (2023)    Overall Financial Resource Strain (CARDIA)     Difficulty of Paying Living Expenses: Not hard at all   Food Insecurity: Not on file (2023)   Transportation Needs: Unknown (2023)    PRAPARE - Transportation     Lack of Transportation (Medical): Not on file     Lack of Transportation (Non-Medical): No   Physical Activity: Inactive (1/3/2024)    Exercise Vital Sign     Days of Exercise per Week: 0 days     Minutes of Exercise per Session: 0 min   Stress: Not on file   Social Connections: Not on file   Intimate Partner Violence: Not on file   Housing Stability: Unknown (2023)    Housing Stability Vital Sign     Unable to Pay for Housing in the Last Year: Not on file     Number of Places Lived in the Last Year:

## 2024-02-08 RX ORDER — FLUTICASONE FUROATE, UMECLIDINIUM BROMIDE AND VILANTEROL TRIFENATATE 200; 62.5; 25 UG/1; UG/1; UG/1
1 POWDER RESPIRATORY (INHALATION) DAILY
Qty: 2 EACH | Refills: 0 | Status: SHIPPED | COMMUNITY
Start: 2024-02-08

## 2024-02-19 DIAGNOSIS — E11.9 TYPE 2 DIABETES MELLITUS WITHOUT COMPLICATION, WITHOUT LONG-TERM CURRENT USE OF INSULIN (HCC): ICD-10-CM

## 2024-02-20 RX ORDER — BLOOD SUGAR DIAGNOSTIC
STRIP MISCELLANEOUS
Qty: 100 STRIP | Refills: 10 | Status: SHIPPED | OUTPATIENT
Start: 2024-02-20

## 2024-03-08 ENCOUNTER — TELEPHONE (OUTPATIENT)
Dept: PRIMARY CARE CLINIC | Age: 69
End: 2024-03-08

## 2024-03-08 RX ORDER — FLUTICASONE FUROATE, UMECLIDINIUM BROMIDE AND VILANTEROL TRIFENATATE 200; 62.5; 25 UG/1; UG/1; UG/1
1 POWDER RESPIRATORY (INHALATION) DAILY
Qty: 2 EACH | Refills: 0 | COMMUNITY
Start: 2024-03-08

## 2024-03-08 NOTE — TELEPHONE ENCOUNTER
montelukast (SINGULAIR) 10 MG tablet     DULoxetine (CYMBALTA) 30 MG extended release capsule     [id number] - [place], [place] - [address] - P [phone] - F [phone]  [address] [facility] [address]  Phone: [phone]  Fax: [phone]

## 2024-03-09 DIAGNOSIS — F33.0 MILD EPISODE OF RECURRENT MAJOR DEPRESSIVE DISORDER (HCC): ICD-10-CM

## 2024-03-09 RX ORDER — DULOXETIN HYDROCHLORIDE 30 MG/1
90 CAPSULE, DELAYED RELEASE ORAL DAILY
Qty: 180 CAPSULE | Refills: 3 | Status: SHIPPED | OUTPATIENT
Start: 2024-03-09

## 2024-03-09 RX ORDER — MONTELUKAST SODIUM 10 MG/1
10 TABLET ORAL NIGHTLY
Qty: 90 TABLET | Refills: 3 | Status: SHIPPED | OUTPATIENT
Start: 2024-03-09

## 2024-03-09 NOTE — TELEPHONE ENCOUNTER
So before I start her on medication, she needs to test to check if it's type 1 or 2 diabetes(order placed). She needs to fast for this because of cholesterol check too.   Urine test also order to check for kidneys with diabetes

## 2024-03-09 NOTE — TELEPHONE ENCOUNTER
ERROR. Please disregard this message:    \"So before I start her on medication, she needs to test to check if it's type 1 or 2 diabetes(order placed). She needs to fast for this because of cholesterol check too. Urine test also order to check for kidneys with diabetes\".

## 2024-04-12 ENCOUNTER — OFFICE VISIT (OUTPATIENT)
Dept: PRIMARY CARE CLINIC | Age: 69
End: 2024-04-12
Payer: COMMERCIAL

## 2024-04-12 VITALS
SYSTOLIC BLOOD PRESSURE: 120 MMHG | DIASTOLIC BLOOD PRESSURE: 80 MMHG | RESPIRATION RATE: 18 BRPM | OXYGEN SATURATION: 97 % | TEMPERATURE: 98.4 F | BODY MASS INDEX: 49.23 KG/M2 | WEIGHT: 293 LBS | HEART RATE: 79 BPM

## 2024-04-12 DIAGNOSIS — J98.8 RESPIRATORY TRACT INFECTION: Primary | ICD-10-CM

## 2024-04-12 LAB
INFLUENZA A ANTIBODY: NORMAL
INFLUENZA B ANTIBODY: NORMAL
Lab: NORMAL
PERFORMING INSTRUMENT: NORMAL
QC PASS/FAIL: NORMAL
S PYO AG THROAT QL: NORMAL
SARS-COV-2, POC: NORMAL

## 2024-04-12 PROCEDURE — 99214 OFFICE O/P EST MOD 30 MIN: CPT | Performed by: INTERNAL MEDICINE

## 2024-04-12 PROCEDURE — 87804 INFLUENZA ASSAY W/OPTIC: CPT | Performed by: INTERNAL MEDICINE

## 2024-04-12 PROCEDURE — 87880 STREP A ASSAY W/OPTIC: CPT | Performed by: INTERNAL MEDICINE

## 2024-04-12 PROCEDURE — 87426 SARSCOV CORONAVIRUS AG IA: CPT | Performed by: INTERNAL MEDICINE

## 2024-04-12 PROCEDURE — 1123F ACP DISCUSS/DSCN MKR DOCD: CPT | Performed by: INTERNAL MEDICINE

## 2024-04-12 RX ORDER — AMOXICILLIN 500 MG/1
500 CAPSULE ORAL 2 TIMES DAILY
Qty: 14 CAPSULE | Refills: 0 | Status: SHIPPED | OUTPATIENT
Start: 2024-04-12 | End: 2024-04-19

## 2024-04-12 RX ORDER — GUAIFENESIN AND DEXTROMETHORPHAN HYDROBROMIDE 100; 10 MG/5ML; MG/5ML
5 SOLUTION ORAL EVERY 4 HOURS PRN
Qty: 236 ML | Refills: 0 | Status: SHIPPED | OUTPATIENT
Start: 2024-04-12

## 2024-04-12 RX ORDER — ASPIRIN 81 MG/1
81 TABLET ORAL 2 TIMES DAILY
COMMUNITY
Start: 2024-04-05 | End: 2024-04-19

## 2024-04-12 SDOH — ECONOMIC STABILITY: FOOD INSECURITY: WITHIN THE PAST 12 MONTHS, THE FOOD YOU BOUGHT JUST DIDN'T LAST AND YOU DIDN'T HAVE MONEY TO GET MORE.: NEVER TRUE

## 2024-04-12 SDOH — ECONOMIC STABILITY: FOOD INSECURITY: WITHIN THE PAST 12 MONTHS, YOU WORRIED THAT YOUR FOOD WOULD RUN OUT BEFORE YOU GOT MONEY TO BUY MORE.: NEVER TRUE

## 2024-04-12 SDOH — ECONOMIC STABILITY: INCOME INSECURITY: HOW HARD IS IT FOR YOU TO PAY FOR THE VERY BASICS LIKE FOOD, HOUSING, MEDICAL CARE, AND HEATING?: NOT HARD AT ALL

## 2024-04-12 NOTE — PROGRESS NOTES
Subjective:      Patient ID: Carrie Clark is a 69 y.o. female    Sore throat x 3 days   HPI  Pt presents with 3 days of sore throat and ear pain, no fever or chills. Assoc cough productive of yellowish sputum. No sinus congestion, no runnynose, no snezzing, no itchy nose. No chest pain. No wheezing, no SOB. No generalized body pain.     S/p left shoulder reverse total arthroplasty.    Past Medical History:   Diagnosis Date    Asthma     COPD (chronic obstructive pulmonary disease) (HCC)     Depression     Diabetes mellitus (HCC)     Sleep apnea      Past Surgical History:   Procedure Laterality Date    CHOLECYSTECTOMY      COLONOSCOPY N/A 2021    COLORECTAL CANCER SCREENING with polypectomies performed by Leo Moreau MD at [facility]     Social History     Socioeconomic History    Marital status:      Spouse name: Not on file    Number of children: Not on file    Years of education: Not on file    Highest education level: Not on file   Occupational History    Not on file   Tobacco Use    Smoking status: Former     Current packs/day: 0.00     Average packs/day: 1 pack/day for 40.0 years (40.0 ttl pk-yrs)     Types: Cigarettes     Start date:      Quit date:      Years since quittin.2    Smokeless tobacco: Former     Quit date:    Vaping Use    Vaping Use: Never used   Substance and Sexual Activity    Alcohol use: Not Currently     Comment: occass    Drug use: Never    Sexual activity: Not Currently   Other Topics Concern    Not on file   Social History Narrative    Not on file     Social Determinants of Health     Financial Resource Strain: Low Risk  (2024)    Overall Financial Resource Strain (CARDIA)     Difficulty of Paying Living Expenses: Not hard at all   Food Insecurity: No Food Insecurity (2024)    Hunger Vital Sign     Worried About Running Out of Food in the Last Year: Never true     Ran Out of Food in the Last Year: Never true   Transportation Needs:

## 2024-04-13 ASSESSMENT — ENCOUNTER SYMPTOMS
WHEEZING: 0
VOMITING: 0
DIARRHEA: 0
RHINORRHEA: 0
ABDOMINAL PAIN: 0
SINUS PRESSURE: 0
SORE THROAT: 1
SINUS PAIN: 0
COUGH: 1
SHORTNESS OF BREATH: 0
NAUSEA: 0

## 2024-05-06 ENCOUNTER — TELEPHONE (OUTPATIENT)
Dept: PRIMARY CARE CLINIC | Age: 69
End: 2024-05-06

## 2024-05-06 NOTE — TELEPHONE ENCOUNTER
Patient calling in, states she went to her kidney doctor (Dr Phillips) and he told her to stop taking water pill -  spironolactone.  She just wants to make you aware.  If you want her to continue [facility]

## 2024-05-08 RX ORDER — FLUTICASONE FUROATE, UMECLIDINIUM BROMIDE AND VILANTEROL TRIFENATATE 200; 62.5; 25 UG/1; UG/1; UG/1
1 POWDER RESPIRATORY (INHALATION) DAILY
Qty: 2 EACH | Refills: 0 | COMMUNITY
Start: 2024-05-08

## 2024-05-29 ENCOUNTER — OFFICE VISIT (OUTPATIENT)
Dept: PRIMARY CARE CLINIC | Age: 69
End: 2024-05-29
Payer: COMMERCIAL

## 2024-05-29 VITALS
OXYGEN SATURATION: 97 % | HEART RATE: 74 BPM | WEIGHT: 293 LBS | DIASTOLIC BLOOD PRESSURE: 64 MMHG | BODY MASS INDEX: 50.36 KG/M2 | SYSTOLIC BLOOD PRESSURE: 120 MMHG | RESPIRATION RATE: 18 BRPM | TEMPERATURE: 98.2 F

## 2024-05-29 DIAGNOSIS — E11.40 TYPE 2 DIABETES MELLITUS WITH DIABETIC NEUROPATHY, WITHOUT LONG-TERM CURRENT USE OF INSULIN (HCC): ICD-10-CM

## 2024-05-29 DIAGNOSIS — R60.0 PEDAL EDEMA: ICD-10-CM

## 2024-05-29 DIAGNOSIS — E16.2 HYPOGLYCEMIA: ICD-10-CM

## 2024-05-29 DIAGNOSIS — Z96.612 S/P REVERSE TOTAL SHOULDER ARTHROPLASTY, LEFT: Primary | ICD-10-CM

## 2024-05-29 PROCEDURE — 99214 OFFICE O/P EST MOD 30 MIN: CPT | Performed by: INTERNAL MEDICINE

## 2024-05-29 PROCEDURE — 3044F HG A1C LEVEL LT 7.0%: CPT | Performed by: INTERNAL MEDICINE

## 2024-05-29 PROCEDURE — 1123F ACP DISCUSS/DSCN MKR DOCD: CPT | Performed by: INTERNAL MEDICINE

## 2024-05-29 RX ORDER — TORSEMIDE 20 MG/1
20 TABLET ORAL DAILY PRN
Qty: 30 TABLET | Refills: 1 | Status: SHIPPED | OUTPATIENT
Start: 2024-05-29

## 2024-05-29 RX ORDER — DOXYCYCLINE HYCLATE 100 MG/1
100 CAPSULE ORAL 2 TIMES DAILY
COMMUNITY

## 2024-05-29 RX ORDER — ACETAMINOPHEN 500 MG
500 TABLET ORAL EVERY 6 HOURS PRN
COMMUNITY

## 2024-05-29 ASSESSMENT — ENCOUNTER SYMPTOMS
VOMITING: 0
COUGH: 0
DIARRHEA: 0
NAUSEA: 0
WHEEZING: 0
ABDOMINAL PAIN: 0
SHORTNESS OF BREATH: 0

## 2024-05-29 NOTE — PROGRESS NOTES
effort is normal. No respiratory distress.      Breath sounds: Normal breath sounds. No wheezing or rales.   Chest:      Chest wall: No tenderness.   Abdominal:      General: Bowel sounds are normal.      Tenderness: There is no abdominal tenderness.   Musculoskeletal:      Right lower leg: Edema present.      Left lower leg: Edema present.      Comments: Shoulder in sling. Left anterior shoulder well-healed transverse scar with soft nonfuctuant swelling, no erythema, no TTP. ROM limited in abduction   Neurological:      Mental Status: She is alert.       Assessment:       Diagnosis Orders   1. S/P reverse total shoulder arthroplasty, left  XR SHOULDER LEFT (MIN 2 VIEWS)    await XR  to r/o dislocation      2. Hypoglycemia      will halve metformin dose      3. Type 2 diabetes mellitus with diabetic neuropathy, without long-term current use of insulin ([facility]) Controlled metFORMIN (GLUCOPHAGE) 500 MG tablet      4. Pedal edema  torsemide (DEMADEX) 20 MG tablet    will commence prn torsemide        Plan:      Orders Placed This Encounter   Procedures    XR SHOULDER LEFT (MIN 2 VIEWS)     Standing Status:   Future     Number of Occurrences:   1     Standing Expiration Date:   5/29/2025     Orders Placed This Encounter   Medications    metFORMIN (GLUCOPHAGE) 500 MG tablet     Sig: Take 1 tablet by mouth daily (with breakfast)     Dispense:  90 tablet     Refill:  3     Dc 2 tabs daily    torsemide (DEMADEX) 20 MG tablet     Sig: Take 1 tablet by mouth daily as needed (foot swelling)     Dispense:  30 tablet     Refill:  1     No follow-ups on file.

## 2024-06-10 ENCOUNTER — OFFICE VISIT (OUTPATIENT)
Dept: PULMONOLOGY | Age: 69
End: 2024-06-10
Payer: COMMERCIAL

## 2024-06-10 VITALS
WEIGHT: 293 LBS | TEMPERATURE: 97.6 F | OXYGEN SATURATION: 98 % | SYSTOLIC BLOOD PRESSURE: 120 MMHG | BODY MASS INDEX: 47.09 KG/M2 | DIASTOLIC BLOOD PRESSURE: 66 MMHG | HEIGHT: 66 IN | HEART RATE: 76 BPM

## 2024-06-10 DIAGNOSIS — Z87.891 PERSONAL HISTORY OF TOBACCO USE: Primary | ICD-10-CM

## 2024-06-10 DIAGNOSIS — Z87.891 HISTORY OF SMOKING: ICD-10-CM

## 2024-06-10 DIAGNOSIS — I51.89 DIASTOLIC DYSFUNCTION: ICD-10-CM

## 2024-06-10 DIAGNOSIS — G47.33 OSA (OBSTRUCTIVE SLEEP APNEA): ICD-10-CM

## 2024-06-10 DIAGNOSIS — E66.01 CLASS 3 SEVERE OBESITY DUE TO EXCESS CALORIES WITH SERIOUS COMORBIDITY AND BODY MASS INDEX (BMI) OF 50.0 TO 59.9 IN ADULT (HCC): ICD-10-CM

## 2024-06-10 DIAGNOSIS — G47.34 NOCTURNAL HYPOXIA: ICD-10-CM

## 2024-06-10 DIAGNOSIS — J44.89 ASTHMA-COPD OVERLAP SYNDROME (HCC): ICD-10-CM

## 2024-06-10 PROCEDURE — G0296 VISIT TO DETERM LDCT ELIG: HCPCS | Performed by: INTERNAL MEDICINE

## 2024-06-10 PROCEDURE — 99214 OFFICE O/P EST MOD 30 MIN: CPT | Performed by: INTERNAL MEDICINE

## 2024-06-10 PROCEDURE — 1123F ACP DISCUSS/DSCN MKR DOCD: CPT | Performed by: INTERNAL MEDICINE

## 2024-06-10 RX ORDER — ALBUTEROL SULFATE AND BUDESONIDE 90; 80 UG/1; UG/1
1 AEROSOL, METERED RESPIRATORY (INHALATION) 4 TIMES DAILY PRN
Qty: 10.7 G | Refills: 0 | COMMUNITY
Start: 2024-06-10

## 2024-06-10 NOTE — PROGRESS NOTES
101 we do not have the 200 Trelegy available  Add air supra  Continue CPAP while asleep patient is compliant  Continue cardioprotective medications and avoid volume overload  Weight loss is recommended  CT lung cancer screening yearly  Your flu shot,   Incentive spirometer        Orders Placed This Encounter   Procedures    CT Lung Screen (Initial/Annual/Baseline)     Age: Patient is 69 y.o.    Smoking History:   Tobacco Use      Smoking status: Former        Packs/day: 0.00        Years: 1 pack/day for 40.0 years (40.0 ttl pk-yrs)        Types: Cigarettes        Start date:         Quit date:         Years since quittin.4      Smokeless tobacco: Former        Date of last lung cancer screenin2024     Standing Status:   Future     Standing Expiration Date:   12/10/2025     Order Specific Question:   Is there documentation of shared decision making?     Answer:   Yes     Order Specific Question:   Is this a low dose CT or a routine CT?     Answer:   Low Dose CT [1]     Order Specific Question:   Is this the first (baseline) CT or an annual exam?     Answer:   Annual [2]     Order Specific Question:   Does the patient show any signs or symptoms of lung cancer?     Answer:   No     Order Specific Question:   Smoking Status?     Answer:   Former [4]     Order Specific Question:   Date quit smoking? (must be within 15 years)     Answer:   2020     Order Specific Question:   Pack Years     Answer:   40    [place] VISIT TO DISCUSS LUNG CA SCREEN W LDCT     Orders Placed This Encounter   Medications    Albuterol-Budesonide (AIRSUPRA) 90-80 MCG/ACT AERO     Sig: Inhale 1 puff into the lungs 4 times daily as needed (SOB)     Dispense:  10.7 g     Refill:  0            Discussed with patient the importance of exercise and weight control and  overall health and well-being.     Reviewed with the patient: current clinical status, medications, activities and diet.      Side effects, adverse effects of the

## 2024-06-19 PROBLEM — R06.09 DOE (DYSPNEA ON EXERTION): Status: ACTIVE | Noted: 2024-03-20

## 2024-06-19 PROBLEM — E66.01 OBESITY, CLASS III, BMI 40-49.9 (MORBID OBESITY) (HCC): Status: ACTIVE | Noted: 2024-04-04

## 2024-06-19 PROBLEM — Z96.612 HISTORY OF TOTAL SHOULDER REPLACEMENT, LEFT: Status: ACTIVE | Noted: 2024-05-23

## 2024-06-19 PROBLEM — E66.813 OBESITY, CLASS III, BMI 40-49.9 (MORBID OBESITY): Status: ACTIVE | Noted: 2024-04-04

## 2024-06-19 PROBLEM — G89.18 POSTOPERATIVE PAIN: Status: ACTIVE | Noted: 2024-05-24

## 2024-06-26 ENCOUNTER — TELEPHONE (OUTPATIENT)
Dept: PRIMARY CARE CLINIC | Age: 69
End: 2024-06-26

## 2024-06-26 DIAGNOSIS — E11.9 TYPE 2 DIABETES MELLITUS WITHOUT COMPLICATION, WITHOUT LONG-TERM CURRENT USE OF INSULIN (HCC): ICD-10-CM

## 2024-06-26 DIAGNOSIS — Z12.31 ENCOUNTER FOR SCREENING MAMMOGRAM FOR MALIGNANT NEOPLASM OF BREAST: Primary | ICD-10-CM

## 2024-06-26 RX ORDER — FLUTICASONE FUROATE, UMECLIDINIUM BROMIDE AND VILANTEROL TRIFENATATE 200; 62.5; 25 UG/1; UG/1; UG/1
1 POWDER RESPIRATORY (INHALATION) DAILY
Qty: 2 EACH | Refills: 0 | COMMUNITY
Start: 2024-06-26

## 2024-06-26 RX ORDER — ALBUTEROL SULFATE AND BUDESONIDE 90; 80 UG/1; UG/1
1 AEROSOL, METERED RESPIRATORY (INHALATION) 4 TIMES DAILY PRN
Qty: 10.7 G | Refills: 0 | COMMUNITY
Start: 2024-06-26

## 2024-06-27 RX ORDER — LANCETS 30 GAUGE
EACH MISCELLANEOUS
Qty: 200 EACH | Refills: 10 | Status: SHIPPED | OUTPATIENT
Start: 2024-06-27

## 2024-07-01 ENCOUNTER — OFFICE VISIT (OUTPATIENT)
Dept: NEUROLOGY | Age: 69
End: 2024-07-01
Payer: COMMERCIAL

## 2024-07-01 VITALS
HEART RATE: 86 BPM | DIASTOLIC BLOOD PRESSURE: 62 MMHG | WEIGHT: 293 LBS | SYSTOLIC BLOOD PRESSURE: 122 MMHG | BODY MASS INDEX: 49.58 KG/M2

## 2024-07-01 DIAGNOSIS — R25.3 TWITCHING: Primary | ICD-10-CM

## 2024-07-01 DIAGNOSIS — R20.0 NUMBNESS: ICD-10-CM

## 2024-07-01 DIAGNOSIS — G54.0 BRACHIAL PLEXOPATHY: ICD-10-CM

## 2024-07-01 PROCEDURE — 1123F ACP DISCUSS/DSCN MKR DOCD: CPT | Performed by: PSYCHIATRY & NEUROLOGY

## 2024-07-01 PROCEDURE — 99214 OFFICE O/P EST MOD 30 MIN: CPT | Performed by: PSYCHIATRY & NEUROLOGY

## 2024-07-01 ASSESSMENT — ENCOUNTER SYMPTOMS
BACK PAIN: 0
CHOKING: 0
TROUBLE SWALLOWING: 0
VOMITING: 0
COLOR CHANGE: 0
SHORTNESS OF BREATH: 0
NAUSEA: 0
PHOTOPHOBIA: 0

## 2024-07-01 NOTE — PROGRESS NOTES
nerve deficit.      Sensory: No sensory deficit.      Motor: No abnormal muscle tone.      Coordination: Coordination normal.      Deep Tendon Reflexes: Reflexes are normal and symmetric. Babinski sign absent on the right side. Babinski sign absent on the left side.   Psychiatric:         Mood and Affect: Mood normal.     Patient's arm is in a sling on the left side.  She is areflexic in the ankles    CT Lung Screen (Initial/Annual/Baseline)    Result Date: 2024  EXAMINATION: LOW DOSE SCREENING CT OF THE CHEST WITHOUT CONTRAST 2024 11:32 am TECHNIQUE: Low dose lung cancer screening CT of the chest was performed without the administration of intravenous contrast.  Multiplanar reformatted images are provided for review. Automated exposure control, iterative reconstruction, and/or weight based adjustment of the mA/kV was utilized to reduce the radiation dose to as low as reasonably achievable. COMPARISON: None HISTORY: ORDERING SYSTEM PROVIDED HISTORY: Personal history of tobacco use TECHNOLOGIST PROVIDED HISTORY: Age: Patient is 68 y.o. Smoking History:   Tobacco Use Smoking status: Former Packs/day: 0.00 Years: 1 pack/day for 40.0 years (40.0 ttl pk-yrs) Types: Cigarettes Start date:  Quit date:  Years since quittin.0 Smokeless tobacco: Former Date of last lung cancer screenin/3/2022 Is there documentation of shared decision making?->Yes Is this a low dose CT or a routine CT?->Low Dose CT Is this the first (baseline) CT or an annual exam?->Annual Does the patient show any signs or symptoms of lung cancer?->No Smoking Status?->Former Date quit smoking? (must be within 15 years)->20 Smoking packs per day?->2 Years smoking?->30 What reading provider will be dictating this exam?->CRC FINDINGS: Mediastinum:  The heart is not enlarged. No pericardial effusion. Normal caliber thoracic aorta. The thyroid gland is unremarkable.  No lymphadenopathy identified with the limitations of no IV

## 2024-07-03 ENCOUNTER — HOSPITAL ENCOUNTER (OUTPATIENT)
Dept: WOMENS IMAGING | Age: 69
Discharge: HOME | End: 2024-07-05
Payer: COMMERCIAL

## 2024-07-03 VITALS — HEIGHT: 66 IN | BODY MASS INDEX: 49.58 KG/M2

## 2024-07-03 DIAGNOSIS — Z12.31 ENCOUNTER FOR SCREENING MAMMOGRAM FOR MALIGNANT NEOPLASM OF BREAST: ICD-10-CM

## 2024-07-03 PROCEDURE — 77063 BREAST TOMOSYNTHESIS BI: CPT

## 2024-07-16 RX ORDER — FLUTICASONE FUROATE, UMECLIDINIUM BROMIDE AND VILANTEROL TRIFENATATE 200; 62.5; 25 UG/1; UG/1; UG/1
1 POWDER RESPIRATORY (INHALATION) DAILY
Qty: 2 EACH | Refills: 0 | COMMUNITY
Start: 2024-07-16

## 2024-08-07 RX ORDER — FLUTICASONE FUROATE, UMECLIDINIUM BROMIDE AND VILANTEROL TRIFENATATE 200; 62.5; 25 UG/1; UG/1; UG/1
1 POWDER RESPIRATORY (INHALATION) DAILY
Qty: 1 EACH | Refills: 0 | COMMUNITY
Start: 2024-08-07

## 2024-08-15 RX ORDER — ROSUVASTATIN CALCIUM 20 MG/1
TABLET, COATED ORAL
Qty: 90 TABLET | Refills: 0 | Status: SHIPPED | OUTPATIENT
Start: 2024-08-15

## 2024-08-20 RX ORDER — FLUTICASONE FUROATE, UMECLIDINIUM BROMIDE AND VILANTEROL TRIFENATATE 100; 62.5; 25 UG/1; UG/1; UG/1
1 POWDER RESPIRATORY (INHALATION) DAILY
Qty: 4 EACH | Refills: 0 | COMMUNITY
Start: 2024-08-20

## 2024-09-05 DIAGNOSIS — E11.40 TYPE 2 DIABETES MELLITUS WITH DIABETIC NEUROPATHY, WITHOUT LONG-TERM CURRENT USE OF INSULIN (HCC): ICD-10-CM

## 2024-09-05 RX ORDER — SEMAGLUTIDE 0.68 MG/ML
INJECTION, SOLUTION SUBCUTANEOUS
Qty: 3 ML | Refills: 5 | Status: SHIPPED | OUTPATIENT
Start: 2024-09-05

## 2024-09-06 DIAGNOSIS — J45.40 MODERATE PERSISTENT ASTHMA WITHOUT COMPLICATION: ICD-10-CM

## 2024-09-06 DIAGNOSIS — J44.89 ASTHMA-COPD OVERLAP SYNDROME (HCC): ICD-10-CM

## 2024-09-06 RX ORDER — FLUTICASONE FUROATE 100 UG/1
1 POWDER RESPIRATORY (INHALATION) DAILY
Qty: 1 EACH | Refills: 3 | Status: SHIPPED | OUTPATIENT
Start: 2024-09-06

## 2024-09-06 NOTE — TELEPHONE ENCOUNTER
Rx requested:  Requested Prescriptions     Pending Prescriptions Disp Refills    fluticasone (ARNUITY ELLIPTA) 100 MCG/ACT AEPB 1 each 3     Sig: Inhale 1 puff into the lungs daily       Last Office Visit:   6/10/2024      Next Visit Date:  Future Appointments   Date Time Provider Department Center   11/25/2024 11:15 AM Mirlande Fournier MD [facility]   2/10/2025 11:00 AM Chirag Mensah MD [facility]

## 2024-09-25 ENCOUNTER — OFFICE VISIT (OUTPATIENT)
Dept: PRIMARY CARE CLINIC | Age: 69
End: 2024-09-25
Payer: COMMERCIAL

## 2024-09-25 ENCOUNTER — HOSPITAL ENCOUNTER (OUTPATIENT)
Dept: GENERAL RADIOLOGY | Age: 69
Discharge: HOME | End: 2024-09-27
Attending: INTERNAL MEDICINE
Payer: COMMERCIAL

## 2024-09-25 VITALS
BODY MASS INDEX: 48.44 KG/M2 | WEIGHT: 293 LBS | TEMPERATURE: 98.5 F | OXYGEN SATURATION: 96 % | SYSTOLIC BLOOD PRESSURE: 124 MMHG | RESPIRATION RATE: 18 BRPM | DIASTOLIC BLOOD PRESSURE: 70 MMHG | HEART RATE: 83 BPM

## 2024-09-25 DIAGNOSIS — R05.2 SUBACUTE COUGH: Primary | ICD-10-CM

## 2024-09-25 DIAGNOSIS — R05.2 SUBACUTE COUGH: ICD-10-CM

## 2024-09-25 PROCEDURE — G2211 COMPLEX E/M VISIT ADD ON: HCPCS | Performed by: INTERNAL MEDICINE

## 2024-09-25 PROCEDURE — 99214 OFFICE O/P EST MOD 30 MIN: CPT | Performed by: INTERNAL MEDICINE

## 2024-09-25 PROCEDURE — 71046 X-RAY EXAM CHEST 2 VIEWS: CPT

## 2024-09-25 PROCEDURE — 1123F ACP DISCUSS/DSCN MKR DOCD: CPT | Performed by: INTERNAL MEDICINE

## 2024-09-25 RX ORDER — CODEINE PHOSPHATE/GUAIFENESIN 10-100MG/5
10 LIQUID (ML) ORAL 2 TIMES DAILY PRN
Qty: 118 ML | Refills: 0 | Status: SHIPPED | OUTPATIENT
Start: 2024-09-25 | End: 2024-10-02

## 2024-09-25 SDOH — ECONOMIC STABILITY: INCOME INSECURITY: HOW HARD IS IT FOR YOU TO PAY FOR THE VERY BASICS LIKE FOOD, HOUSING, MEDICAL CARE, AND HEATING?: NOT HARD AT ALL

## 2024-09-25 SDOH — ECONOMIC STABILITY: FOOD INSECURITY: WITHIN THE PAST 12 MONTHS, YOU WORRIED THAT YOUR FOOD WOULD RUN OUT BEFORE YOU GOT MONEY TO BUY MORE.: NEVER TRUE

## 2024-09-25 SDOH — ECONOMIC STABILITY: FOOD INSECURITY: WITHIN THE PAST 12 MONTHS, THE FOOD YOU BOUGHT JUST DIDN'T LAST AND YOU DIDN'T HAVE MONEY TO GET MORE.: NEVER TRUE

## 2024-09-25 ASSESSMENT — ENCOUNTER SYMPTOMS
DIARRHEA: 0
ABDOMINAL PAIN: 0
WHEEZING: 0
SINUS PAIN: 0
SORE THROAT: 0
NAUSEA: 0
COUGH: 1
SINUS PRESSURE: 0
VOMITING: 0
RHINORRHEA: 0
SHORTNESS OF BREATH: 0

## 2024-09-25 ASSESSMENT — PATIENT HEALTH QUESTIONNAIRE - PHQ9
SUM OF ALL RESPONSES TO PHQ9 QUESTIONS 1 & 2: 0
5. POOR APPETITE OR OVEREATING: NOT AT ALL
9. THOUGHTS THAT YOU WOULD BE BETTER OFF DEAD, OR OF HURTING YOURSELF: NOT AT ALL
8. MOVING OR SPEAKING SO SLOWLY THAT OTHER PEOPLE COULD HAVE NOTICED. OR THE OPPOSITE, BEING SO FIGETY OR RESTLESS THAT YOU HAVE BEEN MOVING AROUND A LOT MORE THAN USUAL: NOT AT ALL
1. LITTLE INTEREST OR PLEASURE IN DOING THINGS: NOT AT ALL
2. FEELING DOWN, DEPRESSED OR HOPELESS: NOT AT ALL
6. FEELING BAD ABOUT YOURSELF - OR THAT YOU ARE A FAILURE OR HAVE LET YOURSELF OR YOUR FAMILY DOWN: NOT AT ALL
7. TROUBLE CONCENTRATING ON THINGS, SUCH AS READING THE NEWSPAPER OR WATCHING TELEVISION: NOT AT ALL
SUM OF ALL RESPONSES TO PHQ QUESTIONS 1-9: 0
4. FEELING TIRED OR HAVING LITTLE ENERGY: NOT AT ALL
3. TROUBLE FALLING OR STAYING ASLEEP: NOT AT ALL
SUM OF ALL RESPONSES TO PHQ QUESTIONS 1-9: 0
10. IF YOU CHECKED OFF ANY PROBLEMS, HOW DIFFICULT HAVE THESE PROBLEMS MADE IT FOR YOU TO DO YOUR WORK, TAKE CARE OF THINGS AT HOME, OR GET ALONG WITH OTHER PEOPLE: NOT DIFFICULT AT ALL

## 2024-09-27 DIAGNOSIS — R05.2 SUBACUTE COUGH: Primary | ICD-10-CM

## 2024-09-27 RX ORDER — METHYLPREDNISOLONE 4 MG
TABLET, DOSE PACK ORAL
Qty: 1 KIT | Refills: 0 | Status: SHIPPED | OUTPATIENT
Start: 2024-09-27

## 2024-10-16 DIAGNOSIS — E11.40 TYPE 2 DIABETES MELLITUS WITH DIABETIC NEUROPATHY, WITHOUT LONG-TERM CURRENT USE OF INSULIN (HCC): ICD-10-CM

## 2024-10-16 RX ORDER — SEMAGLUTIDE 0.68 MG/ML
INJECTION, SOLUTION SUBCUTANEOUS
Qty: 3 ML | Refills: 5 | Status: CANCELLED | OUTPATIENT
Start: 2024-10-16 | End: 2025-01-14

## 2024-10-16 RX ORDER — SEMAGLUTIDE 0.68 MG/ML
INJECTION, SOLUTION SUBCUTANEOUS
Qty: 3 ML | Refills: 5 | Status: SHIPPED | OUTPATIENT
Start: 2024-10-16

## 2024-10-16 NOTE — TELEPHONE ENCOUNTER
Comments:     Last Office Visit (last PCP visit):   9/25/2024    Next Visit Date:  Future Appointments   Date Time Provider Department Center   11/25/2024 11:15 AM Mirlande Fournier MD [facility]   2/10/2025  1:45 PM Chirag Mensah MD [facility]       **If hasn't been seen in over a year OR hasn't followed up according to last diabetes/ADHD visit, make appointment for patient before sending refill to provider.    Rx requested:  Requested Prescriptions     Pending Prescriptions Disp Refills    Semaglutide,0.25 or 0.5MG/DOS, (OZEMPIC, 0.25 OR 0.5 MG/DOSE,) 2 MG/3ML SOPN 3 mL 5

## 2024-10-17 ENCOUNTER — TELEPHONE (OUTPATIENT)
Dept: PRIMARY CARE CLINIC | Age: 69
End: 2024-10-17

## 2024-10-17 DIAGNOSIS — R05.2 SUBACUTE COUGH: Primary | ICD-10-CM

## 2024-10-17 DIAGNOSIS — E11.40 TYPE 2 DIABETES MELLITUS WITH DIABETIC NEUROPATHY, WITHOUT LONG-TERM CURRENT USE OF INSULIN (HCC): ICD-10-CM

## 2024-10-17 RX ORDER — METHYLPREDNISOLONE 4 MG/1
TABLET ORAL
Qty: 1 KIT | Refills: 0 | Status: SHIPPED | OUTPATIENT
Start: 2024-10-17

## 2024-10-17 RX ORDER — AZITHROMYCIN 250 MG/1
TABLET, FILM COATED ORAL
Qty: 1 PACKET | Refills: 0 | Status: SHIPPED | OUTPATIENT
Start: 2024-10-17

## 2024-10-17 RX ORDER — SEMAGLUTIDE 0.68 MG/ML
0.25 INJECTION, SOLUTION SUBCUTANEOUS WEEKLY
Qty: 3 ML | Refills: 5 | Status: SHIPPED | OUTPATIENT
Start: 2024-10-17

## 2024-10-17 RX ORDER — SEMAGLUTIDE 0.68 MG/ML
0.5 INJECTION, SOLUTION SUBCUTANEOUS WEEKLY
Qty: 3 ML | Refills: 5 | Status: SHIPPED | OUTPATIENT
Start: 2024-10-17

## 2024-10-17 NOTE — TELEPHONE ENCOUNTER
Pt needs prescription for steroid, her cough has come back.     Pt also needs ozempic 0.50 mg injection.     Pt will stake 0.25 mg when she gets it but the 0.50 mg is so the insurance will charge her less for when she gets it.

## 2024-11-07 RX ORDER — FLUTICASONE FUROATE, UMECLIDINIUM BROMIDE AND VILANTEROL TRIFENATATE 200; 62.5; 25 UG/1; UG/1; UG/1
1 POWDER RESPIRATORY (INHALATION) DAILY
Qty: 2 EACH | Refills: 0 | COMMUNITY
Start: 2024-11-07

## 2024-11-11 DIAGNOSIS — E11.40 TYPE 2 DIABETES MELLITUS WITH DIABETIC NEUROPATHY, WITHOUT LONG-TERM CURRENT USE OF INSULIN (HCC): Primary | ICD-10-CM

## 2024-11-11 RX ORDER — PIOGLITAZONE 15 MG/1
15 TABLET ORAL DAILY
Qty: 30 TABLET | Refills: 3 | Status: SHIPPED | OUTPATIENT
Start: 2024-11-11

## 2024-11-13 ENCOUNTER — TELEPHONE (OUTPATIENT)
Dept: PULMONOLOGY | Age: 69
End: 2024-11-13

## 2024-11-13 DIAGNOSIS — J44.89 ASTHMA-COPD OVERLAP SYNDROME (HCC): Primary | ICD-10-CM

## 2024-11-13 NOTE — TELEPHONE ENCOUNTER
PATIENT STATES SHE HAS BRONCHITIS, HER PCP GAVE HER MEDICATION AND A STEROID. PATIENT HAS A NEBULIZER AND WANTS TO KNOW IF YOU CAN CALL IN SOME NEBULIZER MEDICATION FOR HER. SHE IS THINKING THIS MIGHT HELP HER. PLEASE ADVISE.

## 2024-11-14 RX ORDER — IPRATROPIUM BROMIDE AND ALBUTEROL SULFATE 2.5; .5 MG/3ML; MG/3ML
1 SOLUTION RESPIRATORY (INHALATION) EVERY 4 HOURS
Qty: 540 ML | Refills: 0 | Status: SHIPPED | OUTPATIENT
Start: 2024-11-14 | End: 2024-12-14

## 2024-11-14 NOTE — TELEPHONE ENCOUNTER
I sent DuoNeb prescription to [facility] [place] please let her know, please schedule her to see me next week if she is not better

## 2024-11-21 RX ORDER — ROSUVASTATIN CALCIUM 20 MG/1
TABLET, COATED ORAL
Qty: 90 TABLET | Refills: 3 | Status: SHIPPED | OUTPATIENT
Start: 2024-11-21

## 2024-11-21 NOTE — TELEPHONE ENCOUNTER
Rx request   Requested Prescriptions     Pending Prescriptions Disp Refills    rosuvastatin (CRESTOR) 20 MG tablet [Pharmacy Med Name: rosuvastatin 20 mg tablet] 90 tablet 0     Sig: TAKE 1 TABLET BY MOUTH EVERY EVENING     LOV Visit date not found  Next Visit Date:  Future Appointments   Date Time Provider Department Center   12/2/2024 11:15 AM Mirlande Fournier MD [facility]   2/10/2025  1:45 PM Chirag Mensah MD [facility]

## 2024-11-25 ENCOUNTER — OFFICE VISIT (OUTPATIENT)
Dept: PRIMARY CARE CLINIC | Age: 69
End: 2024-11-25

## 2024-11-25 VITALS
BODY MASS INDEX: 48.82 KG/M2 | SYSTOLIC BLOOD PRESSURE: 124 MMHG | TEMPERATURE: 98.6 F | OXYGEN SATURATION: 97 % | RESPIRATION RATE: 18 BRPM | HEART RATE: 87 BPM | HEIGHT: 65 IN | DIASTOLIC BLOOD PRESSURE: 78 MMHG | WEIGHT: 293 LBS

## 2024-11-25 DIAGNOSIS — R59.0 AXILLARY LYMPHADENOPATHY: ICD-10-CM

## 2024-11-25 DIAGNOSIS — J44.9 COPD WITHOUT EXACERBATION (HCC): ICD-10-CM

## 2024-11-25 DIAGNOSIS — J20.9 ACUTE BRONCHITIS WITH BRONCHOSPASM: Primary | ICD-10-CM

## 2024-11-25 DIAGNOSIS — E11.40 TYPE 2 DIABETES MELLITUS WITH DIABETIC NEUROPATHY, WITHOUT LONG-TERM CURRENT USE OF INSULIN (HCC): ICD-10-CM

## 2024-11-25 LAB
CHOLEST SERPL-MCNC: 104 MG/DL (ref 0–199)
HDLC SERPL-MCNC: 45 MG/DL (ref 40–59)
INFLUENZA A ANTIBODY: NEGATIVE
INFLUENZA B ANTIBODY: NEGATIVE
LDL CHOLESTEROL: 38 MG/DL (ref 0–129)
Lab: NORMAL
PERFORMING INSTRUMENT: NORMAL
QC PASS/FAIL: NORMAL
SARS-COV-2, POC: NORMAL
TRIGLYCERIDE, FASTING: 105 MG/DL (ref 0–150)

## 2024-11-25 RX ORDER — METHYLPREDNISOLONE 4 MG/1
TABLET ORAL
Qty: 1 KIT | Refills: 0 | Status: SHIPPED | OUTPATIENT
Start: 2024-11-25

## 2024-11-25 RX ORDER — CODEINE PHOSPHATE AND GUAIFENESIN 10; 100 MG/5ML; MG/5ML
10 SOLUTION ORAL 3 TIMES DAILY PRN
Qty: 118 ML | Refills: 0 | Status: SHIPPED | OUTPATIENT
Start: 2024-11-25 | End: 2024-12-02

## 2024-11-25 RX ORDER — CODEINE PHOSPHATE AND GUAIFENESIN 10; 100 MG/5ML; MG/5ML
10 SOLUTION ORAL 3 TIMES DAILY PRN
Qty: 118 ML | Refills: 0 | Status: CANCELLED | OUTPATIENT
Start: 2024-11-25 | End: 2024-12-02

## 2024-11-25 ASSESSMENT — ENCOUNTER SYMPTOMS
SINUS PRESSURE: 0
ABDOMINAL PAIN: 0
RHINORRHEA: 0
SORE THROAT: 0
COUGH: 1
DIARRHEA: 0
WHEEZING: 1
SHORTNESS OF BREATH: 1
NAUSEA: 0
SINUS PAIN: 0
VOMITING: 0

## 2024-11-25 NOTE — PROGRESS NOTES
Subjective:      Patient ID: Carrie Clark is a 69 y.o. female    Cough x 2 weeks   HPI  Pt presents with 2 weeks of cough productive of  yellowish sputum, interfering with her sleep. Denies chest pain. Denies wheezing and SOB above baseline. No fever or chills. No sinus congestion or runnynose. No sore throat, no nausea or vomiting. Hx COPD on Trelegy.       Left armpit lump x 1 week. No change in size, no pain. No breast lump, pain or nipple discharge. Mammogram 4 months ago was negative.   Past Medical History:   Diagnosis Date    Asthma     COPD (chronic obstructive pulmonary disease) (HCC)     Depression     Diabetes mellitus (HCC)     Sleep apnea      Past Surgical History:   Procedure Laterality Date    CHOLECYSTECTOMY      COLONOSCOPY N/A 2021    COLORECTAL CANCER SCREENING with polypectomies performed by Leo Moreau MD at [facility]     Social History     Socioeconomic History    Marital status:      Spouse name: Not on file    Number of children: Not on file    Years of education: Not on file    Highest education level: Not on file   Occupational History    Not on file   Tobacco Use    Smoking status: Former     Current packs/day: 0.00     Average packs/day: 1 pack/day for 40.0 years (40.0 ttl pk-yrs)     Types: Cigarettes     Start date:      Quit date:      Years since quittin.9    Smokeless tobacco: Former     Quit date:    Vaping Use    Vaping status: Never Used   Substance and Sexual Activity    Alcohol use: Not Currently     Comment: occass    Drug use: Never    Sexual activity: Not Currently   Other Topics Concern    Not on file   Social History Narrative    Not on file     Social Determinants of Health     Financial Resource Strain: Low Risk  (2024)    Overall Financial Resource Strain (CARDIA)     Difficulty of Paying Living Expenses: Not hard at all   Food Insecurity: No Food Insecurity (2024)    Hunger Vital Sign     Worried About Running Out

## 2024-12-02 ENCOUNTER — HOSPITAL ENCOUNTER (OUTPATIENT)
Dept: CT IMAGING | Age: 69
Discharge: HOME | End: 2024-12-04
Attending: INTERNAL MEDICINE
Payer: COMMERCIAL

## 2024-12-02 ENCOUNTER — OFFICE VISIT (OUTPATIENT)
Dept: PRIMARY CARE CLINIC | Age: 69
End: 2024-12-02

## 2024-12-02 VITALS
BODY MASS INDEX: 51.59 KG/M2 | OXYGEN SATURATION: 97 % | RESPIRATION RATE: 18 BRPM | TEMPERATURE: 97.2 F | SYSTOLIC BLOOD PRESSURE: 106 MMHG | WEIGHT: 293 LBS | DIASTOLIC BLOOD PRESSURE: 64 MMHG | HEART RATE: 91 BPM

## 2024-12-02 DIAGNOSIS — D72.829 LEUKOCYTOSIS, UNSPECIFIED TYPE: ICD-10-CM

## 2024-12-02 DIAGNOSIS — Z96.612 S/P REVERSE TOTAL SHOULDER ARTHROPLASTY, LEFT: Primary | ICD-10-CM

## 2024-12-02 DIAGNOSIS — R79.89 D-DIMER, ELEVATED: ICD-10-CM

## 2024-12-02 LAB
PERFORMED ON: ABNORMAL
POC CREATININE: 1 MG/DL (ref 0.6–1.2)
POC SAMPLE TYPE: ABNORMAL

## 2024-12-02 PROCEDURE — 6360000004 HC RX CONTRAST MEDICATION: Performed by: INTERNAL MEDICINE

## 2024-12-02 PROCEDURE — 71275 CT ANGIOGRAPHY CHEST: CPT

## 2024-12-02 RX ORDER — IOPAMIDOL 755 MG/ML
75 INJECTION, SOLUTION INTRAVASCULAR
Status: COMPLETED | OUTPATIENT
Start: 2024-12-02 | End: 2024-12-02

## 2024-12-02 RX ADMIN — IOPAMIDOL 75 ML: 755 INJECTION, SOLUTION INTRAVENOUS at 14:15

## 2024-12-02 SDOH — ECONOMIC STABILITY: FOOD INSECURITY: WITHIN THE PAST 12 MONTHS, YOU WORRIED THAT YOUR FOOD WOULD RUN OUT BEFORE YOU GOT MONEY TO BUY MORE.: NEVER TRUE

## 2024-12-02 SDOH — ECONOMIC STABILITY: FOOD INSECURITY: WITHIN THE PAST 12 MONTHS, THE FOOD YOU BOUGHT JUST DIDN'T LAST AND YOU DIDN'T HAVE MONEY TO GET MORE.: NEVER TRUE

## 2024-12-02 SDOH — ECONOMIC STABILITY: INCOME INSECURITY: HOW HARD IS IT FOR YOU TO PAY FOR THE VERY BASICS LIKE FOOD, HOUSING, MEDICAL CARE, AND HEATING?: NOT HARD AT ALL

## 2024-12-02 ASSESSMENT — PATIENT HEALTH QUESTIONNAIRE - PHQ9
9. THOUGHTS THAT YOU WOULD BE BETTER OFF DEAD, OR OF HURTING YOURSELF: NOT AT ALL
7. TROUBLE CONCENTRATING ON THINGS, SUCH AS READING THE NEWSPAPER OR WATCHING TELEVISION: NOT AT ALL
1. LITTLE INTEREST OR PLEASURE IN DOING THINGS: NOT AT ALL
SUM OF ALL RESPONSES TO PHQ QUESTIONS 1-9: 0
6. FEELING BAD ABOUT YOURSELF - OR THAT YOU ARE A FAILURE OR HAVE LET YOURSELF OR YOUR FAMILY DOWN: NOT AT ALL
8. MOVING OR SPEAKING SO SLOWLY THAT OTHER PEOPLE COULD HAVE NOTICED. OR THE OPPOSITE, BEING SO FIGETY OR RESTLESS THAT YOU HAVE BEEN MOVING AROUND A LOT MORE THAN USUAL: NOT AT ALL
3. TROUBLE FALLING OR STAYING ASLEEP: NOT AT ALL
SUM OF ALL RESPONSES TO PHQ9 QUESTIONS 1 & 2: 0
5. POOR APPETITE OR OVEREATING: NOT AT ALL
2. FEELING DOWN, DEPRESSED OR HOPELESS: NOT AT ALL
SUM OF ALL RESPONSES TO PHQ QUESTIONS 1-9: 0
SUM OF ALL RESPONSES TO PHQ QUESTIONS 1-9: 0
4. FEELING TIRED OR HAVING LITTLE ENERGY: NOT AT ALL
10. IF YOU CHECKED OFF ANY PROBLEMS, HOW DIFFICULT HAVE THESE PROBLEMS MADE IT FOR YOU TO DO YOUR WORK, TAKE CARE OF THINGS AT HOME, OR GET ALONG WITH OTHER PEOPLE: NOT DIFFICULT AT ALL
SUM OF ALL RESPONSES TO PHQ QUESTIONS 1-9: 0

## 2024-12-02 ASSESSMENT — ENCOUNTER SYMPTOMS
COUGH: 0
DIARRHEA: 0
ABDOMINAL PAIN: 0
WHEEZING: 0
SHORTNESS OF BREATH: 0
NAUSEA: 0
VOMITING: 0

## 2024-12-02 NOTE — PROGRESS NOTES
Ran Out of Food in the Last Year: Never true   Transportation Needs: Unknown (12/2/2024)    PRAPARE - Transportation     Lack of Transportation (Medical): Not on file     Lack of Transportation (Non-Medical): No   Physical Activity: Inactive (1/3/2024)    Exercise Vital Sign     Days of Exercise per Week: 0 days     Minutes of Exercise per Session: 0 min   Stress: Not on file   Social Connections: Not on file   Intimate Partner Violence: Not on file   Housing Stability: Unknown (12/2/2024)    Housing Stability Vital Sign     Unable to Pay for Housing in the Last Year: Not on file     Number of Times Moved in the Last Year: Not on file     Homeless in the Last Year: No     Family History   Problem Relation Age of Onset    Breast Cancer Mother         does not know age or which side    Colon Cancer Neg Hx      Allergies:  Budesonide-formoterol fumarate  Patient Active Problem List   Diagnosis    Inflammation of left sacroiliac joint (HCC)    Piriformis syndrome of left side    Acute back pain with sciatica, left    Intervertebral disc stenosis of neural canal of lumbar region    Postlaminectomy syndrome, lumbar region    Adenomatous polyp of colon    Adenomatous polyp of ascending colon    Adenomatous polyp of transverse colon    Adenomatous polyp of sigmoid colon    FROYLAN treated with BiPAP    Asthma with COPD with exacerbation (HCC)    Controlled type 2 diabetes mellitus without complication, without long-term current use of insulin (HCC)    Morbid obesity due to excess calories    Congestive heart failure, unspecified HF chronicity, unspecified heart failure type (HCC)    Mild episode of recurrent major depressive disorder (HCC)    Pseudopolyposis of colon without complication, unspecified part of colon (HCC)    Numbness    Twitching    Weakness    Brachial plexopathy    Dysconjugate gaze    Delusions (HCC)    PETERSON (dyspnea on exertion)    History of total shoulder replacement, left    Obesity, Class III, BMI 40-49.9

## 2024-12-04 ENCOUNTER — HOSPITAL ENCOUNTER (OUTPATIENT)
Dept: ULTRASOUND IMAGING | Age: 69
Discharge: HOME | End: 2024-12-06
Payer: COMMERCIAL

## 2024-12-04 DIAGNOSIS — R59.0 AXILLARY LYMPHADENOPATHY: ICD-10-CM

## 2024-12-04 PROCEDURE — 76999 ECHO EXAMINATION PROCEDURE: CPT

## 2024-12-07 DIAGNOSIS — R22.30 AXILLARY MASS, UNSPECIFIED LATERALITY: ICD-10-CM

## 2024-12-07 DIAGNOSIS — R59.0 AXILLARY LYMPHADENOPATHY: Primary | ICD-10-CM

## 2024-12-11 RX ORDER — FLUTICASONE FUROATE, UMECLIDINIUM BROMIDE AND VILANTEROL TRIFENATATE 200; 62.5; 25 UG/1; UG/1; UG/1
1 POWDER RESPIRATORY (INHALATION) DAILY
Qty: 2 EACH | Refills: 0 | COMMUNITY
Start: 2024-12-11

## 2024-12-12 ENCOUNTER — OFFICE VISIT (OUTPATIENT)
Dept: ORTHOPEDIC SURGERY | Facility: CLINIC | Age: 69
End: 2024-12-12
Payer: COMMERCIAL

## 2024-12-12 VITALS — HEIGHT: 66 IN | BODY MASS INDEX: 47.09 KG/M2 | WEIGHT: 293 LBS

## 2024-12-12 DIAGNOSIS — Z98.890 HISTORY OF REVERSE TOTAL REPLACEMENT OF LEFT SHOULDER JOINT: Primary | ICD-10-CM

## 2024-12-12 PROCEDURE — 3008F BODY MASS INDEX DOCD: CPT | Performed by: ORTHOPAEDIC SURGERY

## 2024-12-12 PROCEDURE — 99203 OFFICE O/P NEW LOW 30 MIN: CPT | Performed by: ORTHOPAEDIC SURGERY

## 2024-12-12 PROCEDURE — 1159F MED LIST DOCD IN RCRD: CPT | Performed by: ORTHOPAEDIC SURGERY

## 2024-12-12 PROCEDURE — 99213 OFFICE O/P EST LOW 20 MIN: CPT | Performed by: ORTHOPAEDIC SURGERY

## 2024-12-12 RX ORDER — OMEGA-3/DHA/EPA/FISH OIL 100-400 MG
CAPSULE ORAL
COMMUNITY

## 2024-12-12 RX ORDER — OXYCODONE HYDROCHLORIDE 5 MG/1
TABLET ORAL
COMMUNITY
Start: 2024-05-24

## 2024-12-12 RX ORDER — IPRATROPIUM BROMIDE AND ALBUTEROL SULFATE 2.5; .5 MG/3ML; MG/3ML
SOLUTION RESPIRATORY (INHALATION)
COMMUNITY
Start: 2024-11-14

## 2024-12-12 RX ORDER — METFORMIN HYDROCHLORIDE 500 MG/1
TABLET ORAL
COMMUNITY

## 2024-12-12 RX ORDER — TORSEMIDE 20 MG/1
TABLET ORAL
COMMUNITY

## 2024-12-12 RX ORDER — NALOXONE HYDROCHLORIDE 4 MG/.1ML
SPRAY NASAL
COMMUNITY
Start: 2024-05-02

## 2024-12-12 RX ORDER — BLOOD-GLUCOSE METER
EACH MISCELLANEOUS
COMMUNITY

## 2024-12-12 RX ORDER — FLUTICASONE FUROATE 100 UG/1
1 POWDER RESPIRATORY (INHALATION)
COMMUNITY

## 2024-12-12 RX ORDER — PIOGLITAZONEHYDROCHLORIDE 15 MG/1
1 TABLET ORAL
COMMUNITY
Start: 2024-12-08

## 2024-12-12 RX ORDER — ROSUVASTATIN CALCIUM 20 MG/1
20 TABLET, COATED ORAL
COMMUNITY

## 2024-12-12 RX ORDER — MONTELUKAST SODIUM 10 MG/1
10 TABLET ORAL NIGHTLY
COMMUNITY

## 2024-12-12 RX ORDER — POTASSIUM CHLORIDE 20 MEQ/1
TABLET, EXTENDED RELEASE ORAL
COMMUNITY

## 2024-12-12 RX ORDER — GUAIFENESIN AND DEXTROMETHORPHAN HYDROBROMIDE 10; 100 MG/5ML; MG/5ML
SYRUP ORAL
COMMUNITY
Start: 2024-04-12

## 2024-12-12 RX ORDER — DOCUSATE SODIUM 100 MG/1
CAPSULE, LIQUID FILLED ORAL
COMMUNITY
Start: 2024-05-24

## 2024-12-12 RX ORDER — FLUTICASONE PROPIONATE AND SALMETEROL 500; 50 UG/1; UG/1
1 POWDER RESPIRATORY (INHALATION)
COMMUNITY

## 2024-12-12 RX ORDER — PREDNISONE 10 MG/1
TABLET ORAL
COMMUNITY
Start: 2019-04-12

## 2024-12-12 RX ORDER — GABAPENTIN 100 MG/1
100 CAPSULE ORAL 3 TIMES DAILY
COMMUNITY

## 2024-12-12 RX ORDER — FLUTICASONE FUROATE, UMECLIDINIUM BROMIDE AND VILANTEROL TRIFENATATE 200; 62.5; 25 UG/1; UG/1; UG/1
1 POWDER RESPIRATORY (INHALATION)
COMMUNITY

## 2024-12-12 RX ORDER — METHYLPREDNISOLONE 4 MG/1
TABLET ORAL
COMMUNITY
Start: 2024-11-25

## 2024-12-12 RX ORDER — CODEINE PHOSPHATE AND GUAIFENESIN 10; 100 MG/5ML; MG/5ML
SOLUTION ORAL
COMMUNITY
Start: 2024-11-25

## 2024-12-12 RX ORDER — LANCETS 33 GAUGE
EACH MISCELLANEOUS
COMMUNITY
Start: 2024-11-21

## 2024-12-12 RX ORDER — TIZANIDINE HYDROCHLORIDE 2 MG/1
2 CAPSULE, GELATIN COATED ORAL 3 TIMES DAILY
COMMUNITY

## 2024-12-12 RX ORDER — DULOXETIN HYDROCHLORIDE 30 MG/1
90 CAPSULE, DELAYED RELEASE ORAL DAILY
COMMUNITY

## 2024-12-12 RX ORDER — HYDROCODONE BITARTRATE AND ACETAMINOPHEN 10; 325 MG/1; MG/1
TABLET ORAL
COMMUNITY

## 2024-12-12 NOTE — PROGRESS NOTES
History of present: Patient seen evaluated for recurrent shoulder issues    In summary the patient had a fracture and underwent reverse total shoulder replacement    Subsequently developed instability which is probably the most common complication after reverse shoulder replacement and was revised with a liner exchange    Unfortunately she has gone on to develop some loosening of the cemented stem    Obviously concern is for low-grade infection with cutibacterium and/or other low-grade organisms versus is aseptic loosening    Workup to date including lab values aspiration culture negative but elevated sed rate and CRP concerning for low-grade infection        Past medical history:    The patient's past medical history, family history, social history and review of systems were documented on the patient's medical intake form.  The medical intake form was reviewed and scanned into the electronic medical record for future use.  History is otherwise negative except as stated in the history of present illness.    Physical exam:    General: Alert and oriented to person place and time.  No acute distress and breathing comfortably, pleasant and cooperative with examination.    Head and neck exam: Head is normocephalic atraumatic.    Neck: Supple no visible swelling or deformity.    Cardiovascular: Good perfusion to affected extremities without signs or symptoms of chest pain.    Lungs no audible wheezing or labored breathing on examination.    Abdominal exam: Nondistended nontender    Extremities: The left shoulder was inspected and was found to have incision from reverse shoulder replacement that is clean healed and dry       There was tenderness to touch over the lateral edges of the shoulder over the rotator cuff insertion.  Active forward flexion 80 degrees, external rotation to 20 degrees, abduction to 45 degrees, and internal rotation to the level of anterior iliac crest.    At this time the shoulder is neurovascular  tact and neurosensory intact.  Motor intact C5-T1.  There was no obvious neck pain or radiculopathy noted.  There was no gross instability or adhesive capsulitis symptoms.  There was no evidence of apprehension or apprehension suppression.    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

## 2024-12-18 ENCOUNTER — OFFICE VISIT (OUTPATIENT)
Dept: PULMONOLOGY | Age: 69
End: 2024-12-18
Payer: COMMERCIAL

## 2024-12-18 VITALS
DIASTOLIC BLOOD PRESSURE: 72 MMHG | RESPIRATION RATE: 18 BRPM | OXYGEN SATURATION: 96 % | SYSTOLIC BLOOD PRESSURE: 134 MMHG | WEIGHT: 293 LBS | TEMPERATURE: 97.4 F | HEART RATE: 80 BPM | HEIGHT: 65 IN | BODY MASS INDEX: 48.82 KG/M2

## 2024-12-18 DIAGNOSIS — G47.34 NOCTURNAL HYPOXIA: ICD-10-CM

## 2024-12-18 DIAGNOSIS — E66.01 CLASS 3 SEVERE OBESITY DUE TO EXCESS CALORIES WITH SERIOUS COMORBIDITY AND BODY MASS INDEX (BMI) OF 50.0 TO 59.9 IN ADULT: ICD-10-CM

## 2024-12-18 DIAGNOSIS — E66.813 CLASS 3 SEVERE OBESITY DUE TO EXCESS CALORIES WITH SERIOUS COMORBIDITY AND BODY MASS INDEX (BMI) OF 50.0 TO 59.9 IN ADULT: ICD-10-CM

## 2024-12-18 DIAGNOSIS — I51.89 DIASTOLIC DYSFUNCTION: ICD-10-CM

## 2024-12-18 DIAGNOSIS — J44.89 ASTHMA-COPD OVERLAP SYNDROME (HCC): ICD-10-CM

## 2024-12-18 DIAGNOSIS — R06.02 SHORTNESS OF BREATH: Primary | ICD-10-CM

## 2024-12-18 DIAGNOSIS — G47.33 OSA (OBSTRUCTIVE SLEEP APNEA): ICD-10-CM

## 2024-12-18 PROCEDURE — 99215 OFFICE O/P EST HI 40 MIN: CPT | Performed by: INTERNAL MEDICINE

## 2024-12-18 PROCEDURE — 1159F MED LIST DOCD IN RCRD: CPT | Performed by: INTERNAL MEDICINE

## 2024-12-18 PROCEDURE — 1123F ACP DISCUSS/DSCN MKR DOCD: CPT | Performed by: INTERNAL MEDICINE

## 2024-12-18 RX ORDER — FUROSEMIDE 20 MG/1
20 TABLET ORAL DAILY
Qty: 2 TABLET | Refills: 0 | Status: SHIPPED | OUTPATIENT
Start: 2024-12-18 | End: 2024-12-20

## 2024-12-18 NOTE — PROGRESS NOTES
Subjective:     Carrie Clark is a 69 y.o. female who complains today of:     Chief Complaint   Patient presents with    Follow-up     Pulmonary clearance for Left shoulder replacement    Cough     With congestion       HPI  Patient presents for asthma COPD overlap syndrome    12/18/2024    Presents for follow-up, she reported increased shortness of breath with exertion, with chest tightness, cough productive of clear phlegm, no chest pain, she has increased lower extremity edema, no heartburn, weight is stable, she uses CPAP every night, no nasal congestion or postnasal drip.    6/10/2024  Presents for follow-up, she is doing good, she is able to walk and do her daily activities, with no issues.  No chest pain, no coughing, weight is stable, no heartburn, no nasal congestion or postnasal drip, she uses CPAP every night.  No daytime sleepiness or tiredness.      2/5/2024    Presents for follow-up, she is doing good, she reported good control of her symptoms, her daughter is concerned she does report dyspnea on exertion, and audible wheezing after significant physical activity and going to the store or climbing stairs, however patient feels her symptoms are controlled, no lower extremity edema, no fever no chills, she is scheduled to undergo left shoulder surgery at [facility] in April, she uses CPAP every night.    1/8/2024  Presents for follow-up, she is doing good, no shortness of breath, no chest pain, symptoms controlled currently on Trelegy, no fever no chills weight is stable, no lower extremity edema, she uses CPAP every night with no issues.      8/8/2023  Doing good, symptoms controlled, she is currently on Trelegy and Arnuity, no shortness of breath, no coughing, no chest pain, no lower extremity edema, she is on Ozempic and losing weight, no fever no chills, no nasal congestion or postnasal drip and no heartburn.  She uses her CPAP every night, the device is old, more than 5 years, and the humidifier not

## 2024-12-19 RX ORDER — FLUTICASONE FUROATE, UMECLIDINIUM BROMIDE AND VILANTEROL TRIFENATATE 200; 62.5; 25 UG/1; UG/1; UG/1
1 POWDER RESPIRATORY (INHALATION) DAILY
Qty: 2 EACH | Refills: 0 | COMMUNITY
Start: 2024-12-19

## 2024-12-20 DIAGNOSIS — R06.02 SHORTNESS OF BREATH: ICD-10-CM

## 2024-12-20 LAB
ALBUMIN SERPL-MCNC: 4 G/DL (ref 3.5–4.6)
ANION GAP SERPL CALCULATED.3IONS-SCNC: 10 MEQ/L (ref 9–15)
BUN SERPL-MCNC: 12 MG/DL (ref 8–23)
CALCIUM SERPL-MCNC: 9.5 MG/DL (ref 8.5–9.9)
CHLORIDE SERPL-SCNC: 99 MEQ/L (ref 95–107)
CO2 SERPL-SCNC: 31 MEQ/L (ref 20–31)
CREAT SERPL-MCNC: 1.05 MG/DL (ref 0.5–0.9)
GLUCOSE SERPL-MCNC: 216 MG/DL (ref 70–99)
PHOSPHATE SERPL-MCNC: 3.3 MG/DL (ref 2.3–4.8)
POTASSIUM SERPL-SCNC: 4.7 MEQ/L (ref 3.4–4.9)
SODIUM SERPL-SCNC: 140 MEQ/L (ref 135–144)

## 2024-12-23 ENCOUNTER — TELEPHONE (OUTPATIENT)
Dept: PULMONOLOGY | Age: 69
End: 2024-12-23

## 2024-12-23 NOTE — TELEPHONE ENCOUNTER
PT HAS BEEN ON WATER PILLS FOR AND HER ANKLES ARE STILL SWOLLEN AND SHE STILL HAS A COUGH. SHE STATES IF SHE GETS PREDNISONE THAT WOULD BE HER 3RD TIME ON IT THIS MONTH. SHE IS ASKING IF YOU WANT TO JUST PROCEED WITH BIOLOGICS.

## 2024-12-23 NOTE — TELEPHONE ENCOUNTER
I spoke with the patient, she says she is doing good, she did not express any symptoms to indicate starting new treatment , I will discuss with her and her daughter when I see her in follow-up

## 2024-12-23 NOTE — TELEPHONE ENCOUNTER
PT HAS BEEN ON WATER PILLS FOR AND HER ANKLES ARE STILL SWOLLEN AND SHE STILL HAS A COUGH. SHE STATES IF SHE GETS PREDNISONE THAT WOULD BE HER 3RD TIME ON IT THIS MONTH. SHE IS ASKING IF YOU WANT TO JUST PROCEED WITH BIOLOGICS.        PLEASE ADVISE

## 2024-12-25 DIAGNOSIS — E11.40 TYPE 2 DIABETES MELLITUS WITH DIABETIC NEUROPATHY, WITHOUT LONG-TERM CURRENT USE OF INSULIN (HCC): ICD-10-CM

## 2024-12-25 DIAGNOSIS — R20.2 PARESTHESIA OF FOOT, BILATERAL: ICD-10-CM

## 2024-12-26 RX ORDER — GABAPENTIN 100 MG/1
100 CAPSULE ORAL 3 TIMES DAILY
Qty: 270 CAPSULE | Refills: 3 | Status: SHIPPED | OUTPATIENT
Start: 2024-12-26 | End: 2025-03-26

## 2024-12-31 ENCOUNTER — HOSPITAL ENCOUNTER (OUTPATIENT)
Dept: PULMONOLOGY | Age: 69
Discharge: HOME | End: 2024-12-31
Attending: INTERNAL MEDICINE
Payer: COMMERCIAL

## 2024-12-31 DIAGNOSIS — J44.89 ASTHMA-COPD OVERLAP SYNDROME (HCC): ICD-10-CM

## 2024-12-31 PROCEDURE — 94726 PLETHYSMOGRAPHY LUNG VOLUMES: CPT

## 2024-12-31 PROCEDURE — 94060 EVALUATION OF WHEEZING: CPT

## 2024-12-31 PROCEDURE — 94729 DIFFUSING CAPACITY: CPT

## 2024-12-31 PROCEDURE — 6360000002 HC RX W HCPCS

## 2024-12-31 RX ORDER — ALBUTEROL SULFATE 0.83 MG/ML
SOLUTION RESPIRATORY (INHALATION)
Status: COMPLETED
Start: 2024-12-31 | End: 2024-12-31

## 2024-12-31 RX ADMIN — ALBUTEROL SULFATE 2.5 MG: 2.5 SOLUTION RESPIRATORY (INHALATION) at 09:40

## 2025-01-15 ENCOUNTER — OFFICE VISIT (OUTPATIENT)
Dept: PULMONOLOGY | Age: 70
End: 2025-01-15
Payer: COMMERCIAL

## 2025-01-15 VITALS
DIASTOLIC BLOOD PRESSURE: 84 MMHG | HEART RATE: 88 BPM | SYSTOLIC BLOOD PRESSURE: 143 MMHG | HEIGHT: 65 IN | BODY MASS INDEX: 48.82 KG/M2 | RESPIRATION RATE: 18 BRPM | TEMPERATURE: 97.6 F | WEIGHT: 293 LBS | OXYGEN SATURATION: 96 %

## 2025-01-15 DIAGNOSIS — E66.813 CLASS 3 SEVERE OBESITY DUE TO EXCESS CALORIES WITH SERIOUS COMORBIDITY AND BODY MASS INDEX (BMI) OF 50.0 TO 59.9 IN ADULT: ICD-10-CM

## 2025-01-15 DIAGNOSIS — G47.33 OSA (OBSTRUCTIVE SLEEP APNEA): ICD-10-CM

## 2025-01-15 DIAGNOSIS — I51.89 DIASTOLIC DYSFUNCTION: ICD-10-CM

## 2025-01-15 DIAGNOSIS — E66.01 CLASS 3 SEVERE OBESITY DUE TO EXCESS CALORIES WITH SERIOUS COMORBIDITY AND BODY MASS INDEX (BMI) OF 50.0 TO 59.9 IN ADULT: ICD-10-CM

## 2025-01-15 DIAGNOSIS — R05.3 CHRONIC COUGH: ICD-10-CM

## 2025-01-15 DIAGNOSIS — J44.89 ASTHMA-COPD OVERLAP SYNDROME (HCC): Primary | ICD-10-CM

## 2025-01-15 PROCEDURE — 99214 OFFICE O/P EST MOD 30 MIN: CPT | Performed by: INTERNAL MEDICINE

## 2025-01-15 PROCEDURE — 1123F ACP DISCUSS/DSCN MKR DOCD: CPT | Performed by: INTERNAL MEDICINE

## 2025-01-15 PROCEDURE — 1159F MED LIST DOCD IN RCRD: CPT | Performed by: INTERNAL MEDICINE

## 2025-01-15 NOTE — PROGRESS NOTES
Subjective:     Carrie Clark is a 69 y.o. female who complains today of:     Chief Complaint   Patient presents with    Follow-up     4 Week F/U for Asthma-COPD overlap syndrome and FROYLAN on CPAP/Preop clearance for Left shoulder Revision Reverse shoulder arthroplasty       HPI  Patient presents for asthma COPD overlap syndrome      1/15/2025  Presents for follow-up, continues to have dyspnea on exertion, she has chronic cough worse at night, no chest pain, no lower extremity edema, she did not feel significant improvement when used Lasix, no heartburn, no nasal congestion or postnasal drip.  She has shoulder surgery scheduled on February 6  Echo next week      12/18/2024    Presents for follow-up, she reported increased shortness of breath with exertion, with chest tightness, cough productive of clear phlegm, no chest pain, she has increased lower extremity edema, no heartburn, weight is stable, she uses CPAP every night, no nasal congestion or postnasal drip.    6/10/2024  Presents for follow-up, she is doing good, she is able to walk and do her daily activities, with no issues.  No chest pain, no coughing, weight is stable, no heartburn, no nasal congestion or postnasal drip, she uses CPAP every night.  No daytime sleepiness or tiredness.      2/5/2024    Presents for follow-up, she is doing good, she reported good control of her symptoms, her daughter is concerned she does report dyspnea on exertion, and audible wheezing after significant physical activity and going to the store or climbing stairs, however patient feels her symptoms are controlled, no lower extremity edema, no fever no chills, she is scheduled to undergo left shoulder surgery at [facility] in April, she uses CPAP every night.    1/8/2024  Presents for follow-up, she is doing good, no shortness of breath, no chest pain, symptoms controlled currently on Trelegy, no fever no chills weight is stable, no lower extremity edema, she uses CPAP every night

## 2025-01-20 ENCOUNTER — HOSPITAL ENCOUNTER (OUTPATIENT)
Age: 70
Discharge: HOME | End: 2025-01-22
Attending: INTERNAL MEDICINE
Payer: COMMERCIAL

## 2025-01-20 VITALS
DIASTOLIC BLOOD PRESSURE: 84 MMHG | WEIGHT: 293 LBS | SYSTOLIC BLOOD PRESSURE: 143 MMHG | BODY MASS INDEX: 48.82 KG/M2 | HEIGHT: 65 IN

## 2025-01-20 DIAGNOSIS — R06.02 SHORTNESS OF BREATH: ICD-10-CM

## 2025-01-20 DIAGNOSIS — F33.0 MILD EPISODE OF RECURRENT MAJOR DEPRESSIVE DISORDER (HCC): ICD-10-CM

## 2025-01-20 LAB
ECHO AO ROOT DIAM: 3.5 CM
ECHO AO ROOT INDEX: 1.45 CM/M2
ECHO AV AREA PEAK VELOCITY: 3.5 CM2
ECHO AV AREA VTI: 2.9 CM2
ECHO AV AREA/BSA PEAK VELOCITY: 1.4 CM2/M2
ECHO AV AREA/BSA VTI: 1.2 CM2/M2
ECHO AV MEAN GRADIENT: 3 MMHG
ECHO AV MEAN VELOCITY: 0.8 M/S
ECHO AV PEAK GRADIENT: 5 MMHG
ECHO AV PEAK VELOCITY: 1.3 M/S
ECHO AV VELOCITY RATIO: 0.92
ECHO AV VTI: 29.5 CM
ECHO BSA: 2.58 M2
ECHO EST RA PRESSURE: 3 MMHG
ECHO LA DIAMETER INDEX: 1.49 CM/M2
ECHO LA DIAMETER: 3.6 CM
ECHO LA TO AORTIC ROOT RATIO: 1.03
ECHO LA VOL A-L A2C: 53 ML (ref 22–52)
ECHO LA VOL A-L A4C: 37 ML (ref 22–52)
ECHO LA VOL MOD A2C: 49 ML (ref 22–52)
ECHO LA VOL MOD A4C: 34 ML (ref 22–52)
ECHO LA VOLUME AREA LENGTH: 49 ML
ECHO LA VOLUME INDEX A-L A2C: 22 ML/M2 (ref 16–34)
ECHO LA VOLUME INDEX A-L A4C: 15 ML/M2 (ref 16–34)
ECHO LA VOLUME INDEX AREA LENGTH: 20 ML/M2 (ref 16–34)
ECHO LA VOLUME INDEX MOD A2C: 20 ML/M2 (ref 16–34)
ECHO LA VOLUME INDEX MOD A4C: 14 ML/M2 (ref 16–34)
ECHO LV E' LATERAL VELOCITY: 13.37 CM/S
ECHO LV E' SEPTAL VELOCITY: 12.58 CM/S
ECHO LV EDV A2C: 56 ML
ECHO LV EDV A4C: 110 ML
ECHO LV EDV BP: 78 ML (ref 56–104)
ECHO LV EDV INDEX A4C: 45 ML/M2
ECHO LV EDV INDEX BP: 32 ML/M2
ECHO LV EDV NDEX A2C: 23 ML/M2
ECHO LV EJECTION FRACTION A2C: 58 %
ECHO LV EJECTION FRACTION A4C: 63 %
ECHO LV EJECTION FRACTION BIPLANE: 59 % (ref 55–100)
ECHO LV ESV A2C: 23 ML
ECHO LV ESV A4C: 41 ML
ECHO LV ESV BP: 32 ML (ref 19–49)
ECHO LV ESV INDEX A2C: 10 ML/M2
ECHO LV ESV INDEX A4C: 17 ML/M2
ECHO LV ESV INDEX BP: 13 ML/M2
ECHO LV FRACTIONAL SHORTENING: 29 % (ref 28–44)
ECHO LV INTERNAL DIMENSION DIASTOLE INDEX: 1.57 CM/M2
ECHO LV INTERNAL DIMENSION DIASTOLIC: 3.8 CM (ref 3.9–5.3)
ECHO LV INTERNAL DIMENSION SYSTOLIC INDEX: 1.12 CM/M2
ECHO LV INTERNAL DIMENSION SYSTOLIC: 2.7 CM
ECHO LV IVSD: 1.1 CM (ref 0.6–0.9)
ECHO LV IVSS: 0.9 CM
ECHO LV MASS 2D: 183.4 G (ref 67–162)
ECHO LV MASS INDEX 2D: 75.8 G/M2 (ref 43–95)
ECHO LV POSTERIOR WALL DIASTOLIC: 1.6 CM (ref 0.6–0.9)
ECHO LV POSTERIOR WALL SYSTOLIC: 1.8 CM
ECHO LV RELATIVE WALL THICKNESS RATIO: 0.84
ECHO LVOT AREA: 3.8 CM2
ECHO LVOT AV VTI INDEX: 0.78
ECHO LVOT DIAM: 2.2 CM
ECHO LVOT MEAN GRADIENT: 3 MMHG
ECHO LVOT PEAK GRADIENT: 4 MMHG
ECHO LVOT PEAK VELOCITY: 1.2 M/S
ECHO LVOT STROKE VOLUME INDEX: 36.3 ML/M2
ECHO LVOT SV: 87.8 ML
ECHO LVOT VTI: 23.1 CM
ECHO MV A VELOCITY: 0.92 M/S
ECHO MV E DECELERATION TIME (DT): 254.4 MS
ECHO MV E VELOCITY: 0.76 M/S
ECHO MV E/A RATIO: 0.83
ECHO MV E/E' LATERAL: 5.68
ECHO MV E/E' RATIO (AVERAGED): 5.86
ECHO MV E/E' SEPTAL: 6.04
ECHO PV MAX VELOCITY: 1.1 M/S
ECHO PV PEAK GRADIENT: 5 MMHG
ECHO RIGHT VENTRICULAR SYSTOLIC PRESSURE (RVSP): 8 MMHG
ECHO RV INTERNAL DIMENSION: 3.8 CM
ECHO RV TAPSE: 2.4 CM (ref 1.7–?)
ECHO TV REGURGITANT MAX VELOCITY: 1.14 M/S
ECHO TV REGURGITANT PEAK GRADIENT: 5 MMHG

## 2025-01-20 PROCEDURE — C8929 TTE W OR WO FOL WCON,DOPPLER: HCPCS

## 2025-01-20 PROCEDURE — 6360000004 HC RX CONTRAST MEDICATION: Performed by: INTERNAL MEDICINE

## 2025-01-20 PROCEDURE — 93306 TTE W/DOPPLER COMPLETE: CPT | Performed by: INTERNAL MEDICINE

## 2025-01-20 RX ADMIN — PERFLUTREN 1.5 ML: 6.52 INJECTION, SUSPENSION INTRAVENOUS at 12:16

## 2025-01-20 NOTE — TELEPHONE ENCOUNTER
Rx request   Requested Prescriptions     Pending Prescriptions Disp Refills    DULoxetine (CYMBALTA) 30 MG extended release capsule [Pharmacy Med Name: duloxetine 30 mg capsule,delayed release] 180 capsule 3     Sig: TAKE 3 CAPSULES BY MOUTH ONCE DAILY     LOV 12/2/2024  Next Visit Date:  Future Appointments   Date Time Provider Department Center   2/27/2025  1:15 PM Chirag Mensah MD [facility]   5/27/2025 11:00 AM Mirlande Fournier MD [facility]

## 2025-01-21 ENCOUNTER — TELEPHONE (OUTPATIENT)
Dept: PULMONOLOGY | Age: 70
End: 2025-01-21

## 2025-01-21 RX ORDER — DULOXETIN HYDROCHLORIDE 30 MG/1
90 CAPSULE, DELAYED RELEASE ORAL DAILY
Qty: 180 CAPSULE | Refills: 3 | Status: SHIPPED | OUTPATIENT
Start: 2025-01-21

## 2025-01-21 NOTE — TELEPHONE ENCOUNTER
Pt and her daughter called in requesting the results of her echo,they would like to do a three way call.        Please advise

## 2025-01-22 NOTE — TELEPHONE ENCOUNTER
Echo is normal, please schedule a three-way call on Friday around 9 AM , does not need to be a phone visit I will update her with the results only

## 2025-01-24 DIAGNOSIS — I51.89 DIASTOLIC DYSFUNCTION: Primary | ICD-10-CM

## 2025-01-24 RX ORDER — FUROSEMIDE 20 MG/1
20 TABLET ORAL DAILY PRN
Qty: 10 TABLET | Refills: 0 | Status: SHIPPED | OUTPATIENT
Start: 2025-01-24 | End: 2025-02-03

## 2025-01-24 NOTE — PROGRESS NOTES
Spoke with patient daughter, reviewed echo result, diastolic dysfunction, otherwise normal, I gave her prescription of Lasix to use as needed for lower extremity edema, she will call me if she required Lasix use longer than 3 days..

## 2025-02-10 DIAGNOSIS — J44.89 ASTHMA-COPD OVERLAP SYNDROME (HCC): Primary | ICD-10-CM

## 2025-02-10 RX ORDER — FLUTICASONE FUROATE, UMECLIDINIUM BROMIDE AND VILANTEROL TRIFENATATE 200; 62.5; 25 UG/1; UG/1; UG/1
1 POWDER RESPIRATORY (INHALATION) DAILY
Qty: 2 EACH | Refills: 3 | Status: SHIPPED | OUTPATIENT
Start: 2025-02-10

## 2025-02-10 NOTE — TELEPHONE ENCOUNTER
Patient is requesting samples of Trelegy 200          Rx requested:  Requested Prescriptions      No prescriptions requested or ordered in this encounter         Last Office Visit:   1/15/2025      Next Visit Date:  Future Appointments   Date Time Provider Department Center   2/27/2025  1:15 PM Chirag Mensah MD [facility]   5/27/2025 11:00 AM Mirlande Fournier MD [place] [facility]

## 2025-02-11 SDOH — ECONOMIC STABILITY: FOOD INSECURITY: WITHIN THE PAST 12 MONTHS, THE FOOD YOU BOUGHT JUST DIDN'T LAST AND YOU DIDN'T HAVE MONEY TO GET MORE.: NEVER TRUE

## 2025-02-11 SDOH — ECONOMIC STABILITY: INCOME INSECURITY: IN THE LAST 12 MONTHS, WAS THERE A TIME WHEN YOU WERE NOT ABLE TO PAY THE MORTGAGE OR RENT ON TIME?: NO

## 2025-02-11 SDOH — ECONOMIC STABILITY: FOOD INSECURITY: WITHIN THE PAST 12 MONTHS, YOU WORRIED THAT YOUR FOOD WOULD RUN OUT BEFORE YOU GOT MONEY TO BUY MORE.: NEVER TRUE

## 2025-02-12 ENCOUNTER — APPOINTMENT (OUTPATIENT)
Dept: RADIOLOGY | Facility: HOSPITAL | Age: 70
DRG: 605 | End: 2025-02-12
Payer: MEDICARE

## 2025-02-12 ENCOUNTER — HOSPITAL ENCOUNTER (INPATIENT)
Facility: HOSPITAL | Age: 70
LOS: 1 days | Discharge: NURSING, REHAB OR LONG-TERM CARE | DRG: 605 | End: 2025-02-14
Attending: STUDENT IN AN ORGANIZED HEALTH CARE EDUCATION/TRAINING PROGRAM | Admitting: STUDENT IN AN ORGANIZED HEALTH CARE EDUCATION/TRAINING PROGRAM
Payer: MEDICARE

## 2025-02-12 ENCOUNTER — OFFICE VISIT (OUTPATIENT)
Dept: PRIMARY CARE CLINIC | Age: 70
End: 2025-02-12

## 2025-02-12 VITALS
TEMPERATURE: 98.5 F | BODY MASS INDEX: 52.92 KG/M2 | SYSTOLIC BLOOD PRESSURE: 114 MMHG | DIASTOLIC BLOOD PRESSURE: 70 MMHG | RESPIRATION RATE: 18 BRPM | WEIGHT: 293 LBS | OXYGEN SATURATION: 97 % | HEART RATE: 87 BPM

## 2025-02-12 DIAGNOSIS — Z00.00 MEDICARE ANNUAL WELLNESS VISIT, SUBSEQUENT: Primary | ICD-10-CM

## 2025-02-12 DIAGNOSIS — S82.62XA CLOSED DISPLACED FRACTURE OF LATERAL MALLEOLUS OF LEFT FIBULA, INITIAL ENCOUNTER: ICD-10-CM

## 2025-02-12 DIAGNOSIS — F33.0 MILD EPISODE OF RECURRENT MAJOR DEPRESSIVE DISORDER (HCC): ICD-10-CM

## 2025-02-12 DIAGNOSIS — S61.011A LACERATION OF RIGHT THUMB WITHOUT FOREIGN BODY WITHOUT DAMAGE TO NAIL, INITIAL ENCOUNTER: ICD-10-CM

## 2025-02-12 DIAGNOSIS — S82.892A: Primary | ICD-10-CM

## 2025-02-12 DIAGNOSIS — Z96.612 S/P REVERSE TOTAL SHOULDER ARTHROPLASTY, LEFT: ICD-10-CM

## 2025-02-12 PROCEDURE — 73590 X-RAY EXAM OF LOWER LEG: CPT | Mod: LT

## 2025-02-12 PROCEDURE — 73030 X-RAY EXAM OF SHOULDER: CPT | Mod: LT

## 2025-02-12 PROCEDURE — 12001 RPR S/N/AX/GEN/TRNK 2.5CM/<: CPT | Performed by: PHYSICIAN ASSISTANT

## 2025-02-12 PROCEDURE — 73610 X-RAY EXAM OF ANKLE: CPT | Mod: LT

## 2025-02-12 PROCEDURE — 73030 X-RAY EXAM OF SHOULDER: CPT | Mod: LEFT SIDE | Performed by: RADIOLOGY

## 2025-02-12 PROCEDURE — 2500000001 HC RX 250 WO HCPCS SELF ADMINISTERED DRUGS (ALT 637 FOR MEDICARE OP): Performed by: PHYSICIAN ASSISTANT

## 2025-02-12 PROCEDURE — 73610 X-RAY EXAM OF ANKLE: CPT | Mod: LEFT SIDE | Performed by: RADIOLOGY

## 2025-02-12 PROCEDURE — 73590 X-RAY EXAM OF LOWER LEG: CPT | Mod: LEFT SIDE | Performed by: RADIOLOGY

## 2025-02-12 PROCEDURE — 99285 EMERGENCY DEPT VISIT HI MDM: CPT | Performed by: STUDENT IN AN ORGANIZED HEALTH CARE EDUCATION/TRAINING PROGRAM

## 2025-02-12 PROCEDURE — 2W3RX1Z IMMOBILIZATION OF LEFT LOWER LEG USING SPLINT: ICD-10-PCS | Performed by: PHYSICIAN ASSISTANT

## 2025-02-12 PROCEDURE — 5A09357 ASSISTANCE WITH RESPIRATORY VENTILATION, LESS THAN 24 CONSECUTIVE HOURS, CONTINUOUS POSITIVE AIRWAY PRESSURE: ICD-10-PCS | Performed by: PHYSICIAN ASSISTANT

## 2025-02-12 RX ORDER — DULOXETIN HYDROCHLORIDE 30 MG/1
90 CAPSULE, DELAYED RELEASE ORAL DAILY
Qty: 180 CAPSULE | Refills: 1 | Status: SHIPPED | OUTPATIENT
Start: 2025-02-12

## 2025-02-12 RX ORDER — OXYCODONE AND ACETAMINOPHEN 5; 325 MG/1; MG/1
1 TABLET ORAL ONCE
Status: COMPLETED | OUTPATIENT
Start: 2025-02-12 | End: 2025-02-12

## 2025-02-12 RX ADMIN — OXYCODONE HYDROCHLORIDE AND ACETAMINOPHEN 1 TABLET: 5; 325 TABLET ORAL at 22:05

## 2025-02-12 SDOH — ECONOMIC STABILITY: FOOD INSECURITY

## 2025-02-12 ASSESSMENT — LIFESTYLE VARIABLES
HAVE YOU EVER FELT YOU SHOULD CUT DOWN ON YOUR DRINKING: NO
EVER FELT BAD OR GUILTY ABOUT YOUR DRINKING: NO
TOTAL SCORE: 0
EVER HAD A DRINK FIRST THING IN THE MORNING TO STEADY YOUR NERVES TO GET RID OF A HANGOVER: NO
HAVE PEOPLE ANNOYED YOU BY CRITICIZING YOUR DRINKING: NO
HOW MANY STANDARD DRINKS CONTAINING ALCOHOL DO YOU HAVE ON A TYPICAL DAY: PATIENT DOES NOT DRINK
HOW OFTEN DO YOU HAVE A DRINK CONTAINING ALCOHOL: NEVER

## 2025-02-12 ASSESSMENT — PATIENT HEALTH QUESTIONNAIRE - PHQ9
7. TROUBLE CONCENTRATING ON THINGS, SUCH AS READING THE NEWSPAPER OR WATCHING TELEVISION: NOT AT ALL
3. TROUBLE FALLING OR STAYING ASLEEP: NOT AT ALL
SUM OF ALL RESPONSES TO PHQ9 QUESTIONS 1 & 2: 0
SUM OF ALL RESPONSES TO PHQ QUESTIONS 1-9: 0
6. FEELING BAD ABOUT YOURSELF - OR THAT YOU ARE A FAILURE OR HAVE LET YOURSELF OR YOUR FAMILY DOWN: NOT AT ALL
1. LITTLE INTEREST OR PLEASURE IN DOING THINGS: NOT AT ALL
SUM OF ALL RESPONSES TO PHQ QUESTIONS 1-9: 0
9. THOUGHTS THAT YOU WOULD BE BETTER OFF DEAD, OR OF HURTING YOURSELF: NOT AT ALL
SUM OF ALL RESPONSES TO PHQ QUESTIONS 1-9: 0
SUM OF ALL RESPONSES TO PHQ QUESTIONS 1-9: 0
3. TROUBLE FALLING OR STAYING ASLEEP: NOT AT ALL
8. MOVING OR SPEAKING SO SLOWLY THAT OTHER PEOPLE COULD HAVE NOTICED. OR THE OPPOSITE, BEING SO FIGETY OR RESTLESS THAT YOU HAVE BEEN MOVING AROUND A LOT MORE THAN USUAL: NOT AT ALL
1. LITTLE INTEREST OR PLEASURE IN DOING THINGS: NOT AT ALL
2. FEELING DOWN, DEPRESSED OR HOPELESS: NOT AT ALL
SUM OF ALL RESPONSES TO PHQ QUESTIONS 1-9: 0
9. THOUGHTS THAT YOU WOULD BE BETTER OFF DEAD, OR OF HURTING YOURSELF: NOT AT ALL
2. FEELING DOWN, DEPRESSED OR HOPELESS: NOT AT ALL
5. POOR APPETITE OR OVEREATING: NOT AT ALL
8. MOVING OR SPEAKING SO SLOWLY THAT OTHER PEOPLE COULD HAVE NOTICED. OR THE OPPOSITE, BEING SO FIGETY OR RESTLESS THAT YOU HAVE BEEN MOVING AROUND A LOT MORE THAN USUAL: NOT AT ALL
4. FEELING TIRED OR HAVING LITTLE ENERGY: NOT AT ALL
6. FEELING BAD ABOUT YOURSELF - OR THAT YOU ARE A FAILURE OR HAVE LET YOURSELF OR YOUR FAMILY DOWN: NOT AT ALL
SUM OF ALL RESPONSES TO PHQ9 QUESTIONS 1 & 2: 0
SUM OF ALL RESPONSES TO PHQ QUESTIONS 1-9: 0
10. IF YOU CHECKED OFF ANY PROBLEMS, HOW DIFFICULT HAVE THESE PROBLEMS MADE IT FOR YOU TO DO YOUR WORK, TAKE CARE OF THINGS AT HOME, OR GET ALONG WITH OTHER PEOPLE: NOT DIFFICULT AT ALL
SUM OF ALL RESPONSES TO PHQ QUESTIONS 1-9: 0
10. IF YOU CHECKED OFF ANY PROBLEMS, HOW DIFFICULT HAVE THESE PROBLEMS MADE IT FOR YOU TO DO YOUR WORK, TAKE CARE OF THINGS AT HOME, OR GET ALONG WITH OTHER PEOPLE: NOT DIFFICULT AT ALL
5. POOR APPETITE OR OVEREATING: NOT AT ALL
7. TROUBLE CONCENTRATING ON THINGS, SUCH AS READING THE NEWSPAPER OR WATCHING TELEVISION: NOT AT ALL
SUM OF ALL RESPONSES TO PHQ QUESTIONS 1-9: 0
4. FEELING TIRED OR HAVING LITTLE ENERGY: NOT AT ALL

## 2025-02-12 ASSESSMENT — PAIN DESCRIPTION - ORIENTATION: ORIENTATION: LEFT

## 2025-02-12 ASSESSMENT — PAIN DESCRIPTION - LOCATION
LOCATION: ANKLE
LOCATION: ANKLE

## 2025-02-12 ASSESSMENT — PAIN SCALES - GENERAL
PAINLEVEL_OUTOF10: 1
PAINLEVEL_OUTOF10: 1

## 2025-02-12 ASSESSMENT — PAIN DESCRIPTION - PROGRESSION: CLINICAL_PROGRESSION: NOT CHANGED

## 2025-02-12 ASSESSMENT — PAIN - FUNCTIONAL ASSESSMENT: PAIN_FUNCTIONAL_ASSESSMENT: 0-10

## 2025-02-12 NOTE — PROGRESS NOTES
ibuprofen (ADVIL;MOTRIN) 600 MG tablet Take 1 tablet by mouth every 8 hours as needed for Pain Yes Kamaljit Stark PA   Ascorbic Acid (VITAMIN C CR) 1000 MG TBCR Take by mouth Indications: per patient takes 5000 mg daily Yes Neftali Ervin MD   tiZANidine (ZANAFLEX) 2 MG tablet Take 1 tablet by mouth every 8 hours as needed (muscle spasms) Yes Mirlande Fournier MD   albuterol sulfate HFA (PROVENTIL;VENTOLIN;PROAIR) 108 (90 Base) MCG/ACT inhaler Inhale 2 puffs into the lungs every 6 hours as needed for Wheezing Yes Chirag Mensah MD   fluticasone (FLONASE) 50 MCG/ACT nasal spray 2 sprays by Nasal route daily Yes Mirlande Fournier MD   cholestyramine (QUESTRAN) 4 g packet Take 1 packet by mouth 2 times daily Yes Leo Moreau MD   vitamin B-12 (CYANOCOBALAMIN) 500 MCG tablet Take 1 tablet by mouth daily Yes Neftali Ervin MD   Omega-3 Fatty Acids (FISH OIL) 1000 MG CAPS Take 1 capsule by mouth 3 times daily Yes Mirlande Fournier MD   furosemide (LASIX) 20 MG tablet Take 1 tablet by mouth daily as needed (edema)  Chirag Mensah MD   furosemide (LASIX) 20 MG tablet Take 1 tablet by mouth daily for 2 days  Chirag Mensah MD   ipratropium 0.5 mg-albuterol 2.5 mg (DUONEB) 0.5-2.5 (3) MG/3ML SOLN nebulizer solution Inhale 3 mLs into the lungs every 4 hours  Chirag Mensah MD   [facility] (Including outside providers/suppliers regularly involved in providing care):   Patient Care Team:  Mirlande Fournier MD as PCP - General (Internal Medicine)  Mirlande Fournier MD as PCP - Empaneled Provider  Darci Dickinson MD as Consulting Physician (Neurology)   Recommendations for Preventive Services Due: see orders and patient instructions/AVS.  Recommended screening schedule for the next 5-10 years is provided to the patient in written form: see Patient Instructions/AVS.     Reviewed and updated this visit:  Tobacco  Allergies  Meds  Sexual Hx

## 2025-02-13 PROBLEM — S82.892A: Status: ACTIVE | Noted: 2025-02-13

## 2025-02-13 LAB
ANION GAP SERPL CALC-SCNC: 13 MMOL/L (ref 10–20)
BUN SERPL-MCNC: 12 MG/DL (ref 6–23)
CALCIUM SERPL-MCNC: 9.1 MG/DL (ref 8.6–10.3)
CHLORIDE SERPL-SCNC: 103 MMOL/L (ref 98–107)
CO2 SERPL-SCNC: 26 MMOL/L (ref 21–32)
CREAT SERPL-MCNC: 0.9 MG/DL (ref 0.5–1.05)
EGFRCR SERPLBLD CKD-EPI 2021: 68 ML/MIN/1.73M*2
ERYTHROCYTE [DISTWIDTH] IN BLOOD BY AUTOMATED COUNT: 13.2 % (ref 11.5–14.5)
GLUCOSE BLD MANUAL STRIP-MCNC: 122 MG/DL (ref 74–99)
GLUCOSE BLD MANUAL STRIP-MCNC: 134 MG/DL (ref 74–99)
GLUCOSE BLD MANUAL STRIP-MCNC: 148 MG/DL (ref 74–99)
GLUCOSE SERPL-MCNC: 119 MG/DL (ref 74–99)
HCT VFR BLD AUTO: 28.8 % (ref 36–46)
HGB BLD-MCNC: 9.3 G/DL (ref 12–16)
HOLD SPECIMEN: NORMAL
MCH RBC QN AUTO: 30.5 PG (ref 26–34)
MCHC RBC AUTO-ENTMCNC: 32.3 G/DL (ref 32–36)
MCV RBC AUTO: 94 FL (ref 80–100)
NRBC BLD-RTO: 0 /100 WBCS (ref 0–0)
PLATELET # BLD AUTO: 303 X10*3/UL (ref 150–450)
POTASSIUM SERPL-SCNC: 3.6 MMOL/L (ref 3.5–5.3)
RBC # BLD AUTO: 3.05 X10*6/UL (ref 4–5.2)
SODIUM SERPL-SCNC: 138 MMOL/L (ref 136–145)
WBC # BLD AUTO: 9.2 X10*3/UL (ref 4.4–11.3)

## 2025-02-13 PROCEDURE — G0378 HOSPITAL OBSERVATION PER HR: HCPCS

## 2025-02-13 PROCEDURE — 2500000001 HC RX 250 WO HCPCS SELF ADMINISTERED DRUGS (ALT 637 FOR MEDICARE OP): Performed by: STUDENT IN AN ORGANIZED HEALTH CARE EDUCATION/TRAINING PROGRAM

## 2025-02-13 PROCEDURE — 82947 ASSAY GLUCOSE BLOOD QUANT: CPT

## 2025-02-13 PROCEDURE — 36415 COLL VENOUS BLD VENIPUNCTURE: CPT | Performed by: NURSE PRACTITIONER

## 2025-02-13 PROCEDURE — 80048 BASIC METABOLIC PNL TOTAL CA: CPT | Performed by: NURSE PRACTITIONER

## 2025-02-13 PROCEDURE — 2500000004 HC RX 250 GENERAL PHARMACY W/ HCPCS (ALT 636 FOR OP/ED): Performed by: NURSE PRACTITIONER

## 2025-02-13 PROCEDURE — 97161 PT EVAL LOW COMPLEX 20 MIN: CPT | Mod: GP

## 2025-02-13 PROCEDURE — 0HQFXZZ REPAIR RIGHT HAND SKIN, EXTERNAL APPROACH: ICD-10-PCS | Performed by: PHYSICIAN ASSISTANT

## 2025-02-13 PROCEDURE — 99222 1ST HOSP IP/OBS MODERATE 55: CPT | Performed by: NURSE PRACTITIONER

## 2025-02-13 PROCEDURE — 2500000005 HC RX 250 GENERAL PHARMACY W/O HCPCS: Performed by: NURSE PRACTITIONER

## 2025-02-13 PROCEDURE — 94660 CPAP INITIATION&MGMT: CPT

## 2025-02-13 PROCEDURE — 97165 OT EVAL LOW COMPLEX 30 MIN: CPT | Mod: GO

## 2025-02-13 PROCEDURE — 99221 1ST HOSP IP/OBS SF/LOW 40: CPT | Performed by: ORTHOPAEDIC SURGERY

## 2025-02-13 PROCEDURE — 99238 HOSP IP/OBS DSCHRG MGMT 30/<: CPT | Performed by: HOSPITALIST

## 2025-02-13 PROCEDURE — 94660 CPAP INITIATION&MGMT: CPT | Mod: MUE

## 2025-02-13 PROCEDURE — 1100000001 HC PRIVATE ROOM DAILY

## 2025-02-13 PROCEDURE — 85027 COMPLETE CBC AUTOMATED: CPT | Performed by: NURSE PRACTITIONER

## 2025-02-13 PROCEDURE — 27786 TREATMENT OF ANKLE FRACTURE: CPT | Performed by: ORTHOPAEDIC SURGERY

## 2025-02-13 PROCEDURE — 2500000001 HC RX 250 WO HCPCS SELF ADMINISTERED DRUGS (ALT 637 FOR MEDICARE OP): Performed by: NURSE PRACTITIONER

## 2025-02-13 PROCEDURE — 2500000002 HC RX 250 W HCPCS SELF ADMINISTERED DRUGS (ALT 637 FOR MEDICARE OP, ALT 636 FOR OP/ED): Mod: MUE | Performed by: STUDENT IN AN ORGANIZED HEALTH CARE EDUCATION/TRAINING PROGRAM

## 2025-02-13 RX ORDER — ACETAMINOPHEN 650 MG/1
650 SUPPOSITORY RECTAL EVERY 4 HOURS PRN
Status: DISCONTINUED | OUTPATIENT
Start: 2025-02-13 | End: 2025-02-14 | Stop reason: HOSPADM

## 2025-02-13 RX ORDER — GABAPENTIN 100 MG/1
100 CAPSULE ORAL 3 TIMES DAILY
Status: DISCONTINUED | OUTPATIENT
Start: 2025-02-13 | End: 2025-02-14 | Stop reason: HOSPADM

## 2025-02-13 RX ORDER — ROSUVASTATIN CALCIUM 20 MG/1
20 TABLET, COATED ORAL NIGHTLY
Status: DISCONTINUED | OUTPATIENT
Start: 2025-02-13 | End: 2025-02-14 | Stop reason: HOSPADM

## 2025-02-13 RX ORDER — MONTELUKAST SODIUM 10 MG/1
10 TABLET ORAL NIGHTLY
Status: DISCONTINUED | OUTPATIENT
Start: 2025-02-13 | End: 2025-02-14 | Stop reason: HOSPADM

## 2025-02-13 RX ORDER — POLYETHYLENE GLYCOL 3350 17 G/17G
17 POWDER, FOR SOLUTION ORAL DAILY PRN
Status: DISCONTINUED | OUTPATIENT
Start: 2025-02-13 | End: 2025-02-14 | Stop reason: HOSPADM

## 2025-02-13 RX ORDER — PANTOPRAZOLE SODIUM 40 MG/1
40 TABLET, DELAYED RELEASE ORAL DAILY
Status: DISCONTINUED | OUTPATIENT
Start: 2025-02-13 | End: 2025-02-14 | Stop reason: HOSPADM

## 2025-02-13 RX ORDER — INSULIN LISPRO 100 [IU]/ML
0-5 INJECTION, SOLUTION INTRAVENOUS; SUBCUTANEOUS
Status: DISCONTINUED | OUTPATIENT
Start: 2025-02-13 | End: 2025-02-14 | Stop reason: HOSPADM

## 2025-02-13 RX ORDER — DEXTROSE 50 % IN WATER (D50W) INTRAVENOUS SYRINGE
25
Status: DISCONTINUED | OUTPATIENT
Start: 2025-02-13 | End: 2025-02-14 | Stop reason: HOSPADM

## 2025-02-13 RX ORDER — DEXTROSE 50 % IN WATER (D50W) INTRAVENOUS SYRINGE
12.5
Status: DISCONTINUED | OUTPATIENT
Start: 2025-02-13 | End: 2025-02-14 | Stop reason: HOSPADM

## 2025-02-13 RX ORDER — FLUTICASONE FUROATE AND VILANTEROL 200; 25 UG/1; UG/1
1 POWDER RESPIRATORY (INHALATION)
Status: DISCONTINUED | OUTPATIENT
Start: 2025-02-13 | End: 2025-02-14 | Stop reason: HOSPADM

## 2025-02-13 RX ORDER — DULOXETIN HYDROCHLORIDE 30 MG/1
90 CAPSULE, DELAYED RELEASE ORAL DAILY
Status: DISCONTINUED | OUTPATIENT
Start: 2025-02-13 | End: 2025-02-14 | Stop reason: HOSPADM

## 2025-02-13 RX ORDER — ACETAMINOPHEN 325 MG/1
650 TABLET ORAL EVERY 4 HOURS PRN
Status: DISCONTINUED | OUTPATIENT
Start: 2025-02-13 | End: 2025-02-14 | Stop reason: HOSPADM

## 2025-02-13 RX ORDER — HEPARIN SODIUM 10000 [USP'U]/100ML
INJECTION, SOLUTION INTRAVENOUS
Status: DISCONTINUED
Start: 2025-02-13 | End: 2025-02-13 | Stop reason: WASHOUT

## 2025-02-13 RX ORDER — OXYCODONE AND ACETAMINOPHEN 5; 325 MG/1; MG/1
1 TABLET ORAL EVERY 6 HOURS PRN
Status: DISCONTINUED | OUTPATIENT
Start: 2025-02-13 | End: 2025-02-14 | Stop reason: HOSPADM

## 2025-02-13 RX ORDER — OXYCODONE AND ACETAMINOPHEN 5; 325 MG/1; MG/1
1 TABLET ORAL EVERY 6 HOURS PRN
Qty: 12 TABLET | Refills: 0 | Status: SHIPPED | OUTPATIENT
Start: 2025-02-13 | End: 2025-02-18

## 2025-02-13 RX ORDER — ACETAMINOPHEN 500 MG
10 TABLET ORAL NIGHTLY PRN
Status: DISCONTINUED | OUTPATIENT
Start: 2025-02-13 | End: 2025-02-14 | Stop reason: HOSPADM

## 2025-02-13 RX ORDER — DOCUSATE SODIUM 100 MG/1
100 CAPSULE, LIQUID FILLED ORAL 2 TIMES DAILY
Status: DISCONTINUED | OUTPATIENT
Start: 2025-02-13 | End: 2025-02-14 | Stop reason: HOSPADM

## 2025-02-13 RX ORDER — ACETAMINOPHEN 160 MG/5ML
650 SOLUTION ORAL EVERY 4 HOURS PRN
Status: DISCONTINUED | OUTPATIENT
Start: 2025-02-13 | End: 2025-02-14 | Stop reason: HOSPADM

## 2025-02-13 RX ORDER — HEPARIN SODIUM 5000 [USP'U]/ML
7500 INJECTION, SOLUTION INTRAVENOUS; SUBCUTANEOUS EVERY 8 HOURS SCHEDULED
Status: DISCONTINUED | OUTPATIENT
Start: 2025-02-13 | End: 2025-02-14 | Stop reason: HOSPADM

## 2025-02-13 RX ADMIN — ACETAMINOPHEN 650 MG: 325 TABLET ORAL at 21:10

## 2025-02-13 RX ADMIN — Medication 10 MG: at 01:52

## 2025-02-13 RX ADMIN — DULOXETINE 90 MG: 30 CAPSULE, DELAYED RELEASE ORAL at 12:26

## 2025-02-13 RX ADMIN — DOCUSATE SODIUM 100 MG: 100 CAPSULE, LIQUID FILLED ORAL at 21:08

## 2025-02-13 RX ADMIN — PANTOPRAZOLE SODIUM 40 MG: 40 TABLET, DELAYED RELEASE ORAL at 06:37

## 2025-02-13 RX ADMIN — FLUTICASONE FUROATE AND VILANTEROL TRIFENATATE 1 PUFF: 200; 25 POWDER RESPIRATORY (INHALATION) at 06:34

## 2025-02-13 RX ADMIN — GABAPENTIN 100 MG: 100 CAPSULE ORAL at 12:26

## 2025-02-13 RX ADMIN — HEPARIN SODIUM 7500 UNITS: 5000 INJECTION INTRAVENOUS; SUBCUTANEOUS at 21:08

## 2025-02-13 RX ADMIN — MONTELUKAST SODIUM 10 MG: 10 TABLET, FILM COATED ORAL at 21:08

## 2025-02-13 RX ADMIN — ACETAMINOPHEN 650 MG: 325 TABLET ORAL at 16:02

## 2025-02-13 RX ADMIN — HEPARIN SODIUM 7500 UNITS: 5000 INJECTION INTRAVENOUS; SUBCUTANEOUS at 06:33

## 2025-02-13 RX ADMIN — Medication 2 L/MIN: at 11:23

## 2025-02-13 RX ADMIN — ACETAMINOPHEN 650 MG: 325 TABLET ORAL at 01:53

## 2025-02-13 RX ADMIN — Medication 2 L/MIN: at 01:59

## 2025-02-13 RX ADMIN — GABAPENTIN 100 MG: 100 CAPSULE ORAL at 21:08

## 2025-02-13 RX ADMIN — HEPARIN SODIUM 7500 UNITS: 5000 INJECTION INTRAVENOUS; SUBCUTANEOUS at 01:53

## 2025-02-13 RX ADMIN — TIOTROPIUM BROMIDE INHALATION SPRAY 2 PUFF: 3.12 SPRAY, METERED RESPIRATORY (INHALATION) at 06:34

## 2025-02-13 RX ADMIN — ROSUVASTATIN CALCIUM 20 MG: 20 TABLET, FILM COATED ORAL at 21:08

## 2025-02-13 SDOH — SOCIAL STABILITY: SOCIAL INSECURITY
WITHIN THE LAST YEAR, HAVE YOU BEEN KICKED, HIT, SLAPPED, OR OTHERWISE PHYSICALLY HURT BY YOUR PARTNER OR EX-PARTNER?: NO

## 2025-02-13 SDOH — SOCIAL STABILITY: SOCIAL INSECURITY: HAVE YOU HAD ANY THOUGHTS OF HARMING ANYONE ELSE?: NO

## 2025-02-13 SDOH — ECONOMIC STABILITY: HOUSING INSECURITY: IN THE LAST 12 MONTHS, WAS THERE A TIME WHEN YOU WERE NOT ABLE TO PAY THE MORTGAGE OR RENT ON TIME?: NO

## 2025-02-13 SDOH — ECONOMIC STABILITY: FOOD INSECURITY: HOW HARD IS IT FOR YOU TO PAY FOR THE VERY BASICS LIKE FOOD, HOUSING, MEDICAL CARE, AND HEATING?: SOMEWHAT HARD

## 2025-02-13 SDOH — SOCIAL STABILITY: SOCIAL INSECURITY: HAVE YOU HAD THOUGHTS OF HARMING ANYONE ELSE?: NO

## 2025-02-13 SDOH — SOCIAL STABILITY: SOCIAL INSECURITY
WITHIN THE LAST YEAR, HAVE YOU BEEN RAPED OR FORCED TO HAVE ANY KIND OF SEXUAL ACTIVITY BY YOUR PARTNER OR EX-PARTNER?: NO

## 2025-02-13 SDOH — SOCIAL STABILITY: SOCIAL INSECURITY: WITHIN THE LAST YEAR, HAVE YOU BEEN HUMILIATED OR EMOTIONALLY ABUSED IN OTHER WAYS BY YOUR PARTNER OR EX-PARTNER?: NO

## 2025-02-13 SDOH — SOCIAL STABILITY: SOCIAL INSECURITY: WERE YOU ABLE TO COMPLETE ALL THE BEHAVIORAL HEALTH SCREENINGS?: YES

## 2025-02-13 SDOH — ECONOMIC STABILITY: FOOD INSECURITY: WITHIN THE PAST 12 MONTHS, THE FOOD YOU BOUGHT JUST DIDN'T LAST AND YOU DIDN'T HAVE MONEY TO GET MORE.: SOMETIMES TRUE

## 2025-02-13 SDOH — ECONOMIC STABILITY: INCOME INSECURITY: IN THE PAST 12 MONTHS HAS THE ELECTRIC, GAS, OIL, OR WATER COMPANY THREATENED TO SHUT OFF SERVICES IN YOUR HOME?: NO

## 2025-02-13 SDOH — SOCIAL STABILITY: SOCIAL INSECURITY: WITHIN THE LAST YEAR, HAVE YOU BEEN AFRAID OF YOUR PARTNER OR EX-PARTNER?: NO

## 2025-02-13 SDOH — ECONOMIC STABILITY: FOOD INSECURITY
WITHIN THE PAST 12 MONTHS, YOU WORRIED THAT YOUR FOOD WOULD RUN OUT BEFORE YOU GOT THE MONEY TO BUY MORE.: SOMETIMES TRUE

## 2025-02-13 SDOH — SOCIAL STABILITY: SOCIAL INSECURITY: ARE THERE ANY APPARENT SIGNS OF INJURIES/BEHAVIORS THAT COULD BE RELATED TO ABUSE/NEGLECT?: NO

## 2025-02-13 SDOH — ECONOMIC STABILITY: HOUSING INSECURITY: AT ANY TIME IN THE PAST 12 MONTHS, WERE YOU HOMELESS OR LIVING IN A SHELTER (INCLUDING NOW)?: NO

## 2025-02-13 SDOH — SOCIAL STABILITY: SOCIAL INSECURITY: ARE YOU OR HAVE YOU BEEN THREATENED OR ABUSED PHYSICALLY, EMOTIONALLY, OR SEXUALLY BY ANYONE?: NO

## 2025-02-13 SDOH — SOCIAL STABILITY: SOCIAL INSECURITY: HAS ANYONE EVER THREATENED TO HURT YOUR FAMILY OR YOUR PETS?: NO

## 2025-02-13 SDOH — ECONOMIC STABILITY: TRANSPORTATION INSECURITY: IN THE PAST 12 MONTHS, HAS LACK OF TRANSPORTATION KEPT YOU FROM MEDICAL APPOINTMENTS OR FROM GETTING MEDICATIONS?: NO

## 2025-02-13 SDOH — SOCIAL STABILITY: SOCIAL INSECURITY: ABUSE: ADULT

## 2025-02-13 SDOH — SOCIAL STABILITY: SOCIAL INSECURITY: DOES ANYONE TRY TO KEEP YOU FROM HAVING/CONTACTING OTHER FRIENDS OR DOING THINGS OUTSIDE YOUR HOME?: NO

## 2025-02-13 SDOH — SOCIAL STABILITY: SOCIAL INSECURITY: DO YOU FEEL UNSAFE GOING BACK TO THE PLACE WHERE YOU ARE LIVING?: NO

## 2025-02-13 SDOH — ECONOMIC STABILITY: HOUSING INSECURITY: IN THE PAST 12 MONTHS, HOW MANY TIMES HAVE YOU MOVED WHERE YOU WERE LIVING?: 0

## 2025-02-13 SDOH — SOCIAL STABILITY: SOCIAL INSECURITY: DO YOU FEEL ANYONE HAS EXPLOITED OR TAKEN ADVANTAGE OF YOU FINANCIALLY OR OF YOUR PERSONAL PROPERTY?: NO

## 2025-02-13 ASSESSMENT — ENCOUNTER SYMPTOMS
VOMITING: 0
FREQUENCY: 0
HEMATURIA: 0
DIARRHEA: 0
FEVER: 0
CONSTIPATION: 0
ARTHRALGIAS: 1
NAUSEA: 0
FLANK PAIN: 0
DYSURIA: 0
PALPITATIONS: 0
ABDOMINAL PAIN: 0
CHILLS: 0
SHORTNESS OF BREATH: 0

## 2025-02-13 ASSESSMENT — COGNITIVE AND FUNCTIONAL STATUS - GENERAL
PERSONAL GROOMING: A LITTLE
DAILY ACTIVITIY SCORE: 16
MOVING FROM LYING ON BACK TO SITTING ON SIDE OF FLAT BED WITH BEDRAILS: A LITTLE
TOILETING: A LOT
HELP NEEDED FOR BATHING: A LOT
PERSONAL GROOMING: A LITTLE
MOBILITY SCORE: 12
DRESSING REGULAR LOWER BODY CLOTHING: A LOT
PATIENT BASELINE BEDBOUND: NO
DRESSING REGULAR LOWER BODY CLOTHING: A LOT
WALKING IN HOSPITAL ROOM: A LOT
MOVING FROM LYING ON BACK TO SITTING ON SIDE OF FLAT BED WITH BEDRAILS: A LITTLE
MOVING TO AND FROM BED TO CHAIR: A LOT
HELP NEEDED FOR BATHING: A LOT
MOBILITY SCORE: 13
WALKING IN HOSPITAL ROOM: A LOT
EATING MEALS: A LITTLE
DAILY ACTIVITIY SCORE: 14
TURNING FROM BACK TO SIDE WHILE IN FLAT BAD: A LITTLE
CLIMB 3 TO 5 STEPS WITH RAILING: TOTAL
DRESSING REGULAR UPPER BODY CLOTHING: A LITTLE
TURNING FROM BACK TO SIDE WHILE IN FLAT BAD: A LOT
CLIMB 3 TO 5 STEPS WITH RAILING: TOTAL
STANDING UP FROM CHAIR USING ARMS: A LOT
TOILETING: A LOT
STANDING UP FROM CHAIR USING ARMS: A LOT
MOVING TO AND FROM BED TO CHAIR: A LOT
DRESSING REGULAR UPPER BODY CLOTHING: A LOT

## 2025-02-13 ASSESSMENT — PAIN - FUNCTIONAL ASSESSMENT
PAIN_FUNCTIONAL_ASSESSMENT: 0-10

## 2025-02-13 ASSESSMENT — PAIN SCALES - GENERAL
PAINLEVEL_OUTOF10: 1
PAINLEVEL_OUTOF10: 0 - NO PAIN
PAINLEVEL_OUTOF10: 3
PAINLEVEL_OUTOF10: 5 - MODERATE PAIN
PAINLEVEL_OUTOF10: 5 - MODERATE PAIN
PAINLEVEL_OUTOF10: 1

## 2025-02-13 ASSESSMENT — ACTIVITIES OF DAILY LIVING (ADL)
HEARING - RIGHT EAR: FUNCTIONAL
LACK_OF_TRANSPORTATION: NO
PATIENT'S MEMORY ADEQUATE TO SAFELY COMPLETE DAILY ACTIVITIES?: YES
DRESSING YOURSELF: NEEDS ASSISTANCE
GROOMING: NEEDS ASSISTANCE
LACK_OF_TRANSPORTATION: NO
FEEDING YOURSELF: INDEPENDENT
WALKS IN HOME: NEEDS ASSISTANCE
BATHING: NEEDS ASSISTANCE
TOILETING: NEEDS ASSISTANCE
BATHING_ASSISTANCE: MODERATE
HEARING - LEFT EAR: FUNCTIONAL
JUDGMENT_ADEQUATE_SAFELY_COMPLETE_DAILY_ACTIVITIES: YES
ADEQUATE_TO_COMPLETE_ADL: YES

## 2025-02-13 ASSESSMENT — LIFESTYLE VARIABLES
AUDIT-C TOTAL SCORE: 1
HOW MANY STANDARD DRINKS CONTAINING ALCOHOL DO YOU HAVE ON A TYPICAL DAY: 1 OR 2
HOW OFTEN DO YOU HAVE A DRINK CONTAINING ALCOHOL: MONTHLY OR LESS
HOW OFTEN DO YOU HAVE 6 OR MORE DRINKS ON ONE OCCASION: NEVER
AUDIT-C TOTAL SCORE: 1
SKIP TO QUESTIONS 9-10: 1

## 2025-02-13 ASSESSMENT — PATIENT HEALTH QUESTIONNAIRE - PHQ9
2. FEELING DOWN, DEPRESSED OR HOPELESS: SEVERAL DAYS
1. LITTLE INTEREST OR PLEASURE IN DOING THINGS: SEVERAL DAYS
SUM OF ALL RESPONSES TO PHQ9 QUESTIONS 1 & 2: 2

## 2025-02-13 ASSESSMENT — PAIN DESCRIPTION - LOCATION: LOCATION: HEAD

## 2025-02-13 NOTE — PROGRESS NOTES
02/13/25 1423   Discharge Planning   Home or Post Acute Services Post acute facilities (Rehab/SNF/etc)   Type of Post Acute Facility Services Rehab;Skilled nursing   Expected Discharge Disposition SNF   Does the patient need discharge transport arranged? Yes   RoundTrip coordination needed? Yes   Has discharge transport been arranged? Yes   What day is the transport expected? 02/13/25   What time is the transport expected? 1645     Pt transport to [facility] confirmed for 4:45 PM, RN given report  number, DSC sent 7000 in [place], GF, AVS and DC summary attached in [facility], confirmed pt RN has hard RX for pain med that will be sent in DC packet, and pt states her daughter will take her CPAP to the SNF. Pt/daughter notified of transport time and in agreement.

## 2025-02-13 NOTE — PROGRESS NOTES
Patient with left lateral malleolus ankle fracture.     Imaging reviewed and is consistent with the above noted fracture. She will be treated non-operatively in tall walking boot. She may WBAT in boot.     Follow up in two weeks in outpatient orthopedic clinic.     Full consult note to follow.

## 2025-02-13 NOTE — DISCHARGE INSTRUCTIONS
Weight bearing as tolerated in walking boot to left lower extremity   Boot to be worn at all times with ambulation   Follow up in two weeks with orthopedic surgeon in outpatient clinic.

## 2025-02-13 NOTE — PROGRESS NOTES
Occupational Therapy    Evaluation    Patient Name: Naz Naik  MRN: [id number]  Department: [facility]  Room: [address] [place] [address]-  Today's Date: 2/13/2025  Time Calculation  Start Time: 1031  Stop Time: 1115  Time Calculation (min): 44 min        Assessment:  OT Assessment: Pt. presents with impaired balance, safety, endurance, and ability to complete ADLs/IADLs and transfers.  Prognosis: Good  Barriers to Discharge Home: Caregiver assistance, Physical needs  Evaluation/Treatment Tolerance: Patient tolerated treatment well  End of Session Communication: Bedside nurse, Care Coordinator  End of Session Patient Position: Alarm on, Up in chair  OT Assessment Results: Decreased ADL status, Decreased safe judgment during ADL, Decreased endurance, Decreased functional mobility, Decreased IADLs, Decreased trunk control for functional activities  Prognosis: Good  Evaluation/Treatment Tolerance: Patient tolerated treatment well  Plan:  Treatment Interventions: ADL retraining, Functional transfer training, Endurance training, Compensatory technique education  OT Frequency: 2 times per week  OT Discharge Recommendations: Moderate intensity level of continued care  OT - OK to Discharge: Yes (when medically stable/cleared)    Subjective   Current Problem:  1. Fracture of left malleolus, closed, initial encounter        2. Closed displaced fracture of lateral malleolus of left fibula, initial encounter        3. Laceration of right thumb without foreign body without damage to nail, initial encounter          General:  General  Reason for Referral: ADL impairment  Referred By: PT/OT Rik  Past Medical History Relevant to Rehab: HLD,COPD,CPAP,LEORA, DM2, L TSR on 2/6 ,  Family/Caregiver Present: No  Co-Treatment: PT  Co-Treatment Reason: to maximize pt. safety  Prior to Session Communication: Bedside nurse  Patient Position Received: Bed, 3 rail up, Alarm off, not on at start of session  General Comment: Pt. is 69 y/o female to ED s/p fall  down stairs. XR L ankle/tib/fib (+) mildly angulated acute fx lateral mallelous with soft tissue swelling. Recent L shoulder replacement 2/6/25  Precautions:  LE Weight Bearing Status: Weight Bearing as Tolerated (LLE.)  Medical Precautions: Fall precautions  Braces Applied: in walking boot LLE.  Precautions Comment: Shoulder precautions: NWB on operative extremity in brace , No shoulder ROM, okay for hand/wrist/elbow ROM  per [facility] note (Pt. wears shoulder immobilizer)    Pain:  Pain Assessment  Pain Assessment: 0-10  0-10 (Numeric) Pain Score: 5 - Moderate pain  Pain Type: Acute pain  Pain Location: Ankle  Pain Orientation: Left    Objective   Cognition:  Overall Cognitive Status: Within Functional Limits    Home Living:  Home Living Comments: Pt. lives alone in one story house, has 4 SONAL with HR on one side. Pt. has 3 cats. Tub shower with grab bar and hand held shower head.  Prior Function:  Prior Function Comments: States thats she is independent with ADLs/IADLs at baseline. Has not been showering or sponge bathing since shoulder surgery. used RW for community distances PTA, no AD in house. 1 fall. Drives. Family lives across the street.    ADL:  Eating Assistance: Independent  Grooming Assistance: Minimal  Bathing Assistance: Moderate  UE Dressing Assistance: Minimal  LE Dressing Assistance: Maximal  Toileting Assistance with Device: Maximal  Activity Tolerance:  Endurance: Decreased tolerance for upright activites  Activity Tolerance Comments: pain and fatigue  Bed Mobility/Transfers: Bed Mobility  Bed Mobility: Yes  Bed Mobility 1  Bed Mobility 1: Supine to sitting  Level of Assistance 1: Minimum assistance  Bed Mobility Comments 1: head of bed elevated. Min A to steady/elevate trunk    Transfers  Transfer: Yes  Transfer 1  Technique 1: Sit to stand  Transfer Device 1: Cane (QC)  Transfer Level of Assistance 1: Moderate assistance  Trials/Comments 1: x2 from bed level. bed elevated height for  ease, pt. using RUE to push from rail. gait belt donned for safety. Assistance for upright balance, steadying, and weight shifting.  Transfers 2  Technique 2: Stand pivot  Transfer Device 2:  (QC)  Transfer Level of Assistance 2: Moderate assistance  Trials/Comments 2: gait belt donned, bed>recliner, recliner <> BSC. Increased time to complete with verbal cues for QC placement and step sequencing. assist to balance and steady.    Standing Balance:  Dynamic Standing Balance  Dynamic Standing-Comments: Poor +     Strength:  Strength Comments: RUE WNLMMT. NT LUE; in shoulder immobilizer    Coordination:  Movements are Fluid and Coordinated: Yes  Coordination Comment: WNL RUE   Hand Function:  Gross Grasp: Functional  Extremities: RUE   RUE : Within Functional Limits and LUE   LUE:  (NT, no ROM shoulder. in shoulder immobilizer)    Outcome Measures:[facility] Daily Activity  Putting on and taking off regular lower body clothing: A lot  Bathing (including washing, rinsing, drying): A lot  Putting on and taking off regular upper body clothing: A little  Toileting, which includes using toilet, bedpan or urinal: A lot  Taking care of personal grooming such as brushing teeth: A little  Eating Meals: None  Daily Activity - Total Score: 16      Education Documentation  Body Mechanics, taught by Vernell Figueredo OT at 2/13/2025 12:31 PM.  Learner: Patient  Readiness: Acceptance  Method: Explanation  Response: Verbalizes Understanding, Needs Reinforcement    Precautions, taught by Vernell Figueredo OT at 2/13/2025 12:31 PM.  Learner: Patient  Readiness: Acceptance  Method: Explanation  Response: Verbalizes Understanding, Needs Reinforcement    ADL Training, taught by Vernell Figueredo OT at 2/13/2025 12:31 PM.  Learner: Patient  Readiness: Acceptance  Method: Explanation  Response: Verbalizes Understanding, Needs Reinforcement    Education Comments  Recommendations for BSC and home going safety vs. Rehab.      IP EDUCATION:  Education  Individual(s) Educated: Patient  Education Provided: Fall precautons, Risk and benefits of OT discussed with patient or other, POC discussed and agreed upon, Diagnosis & Precautions    Goals:  Encounter Problems       Encounter Problems (Active)       OT Goals       CGA for all functional transfers with QC (Progressing)       Start:  02/13/25    Expected End:  02/27/25            Min A for Lb dressing with AE as needed  (Progressing)       Start:  02/13/25    Expected End:  02/27/25            SBA for UB dressing  (Progressing)       Start:  02/13/25    Expected End:  02/27/25            Min A for toileting tasks and clothing mgmt  (Progressing)       Start:  02/13/25    Expected End:  02/27/25            Fair + dyn standing balance during functional activities  (Progressing)       Start:  02/13/25    Expected End:  02/27/25

## 2025-02-13 NOTE — DISCHARGE SUMMARY
Discharge Diagnosis  Fracture of left malleolus, closed, initial encounter    Issues Requiring Follow-Up  None     Discharge Meds     Medication List      START taking these medications     oxyCODONE-acetaminophen 5-325 mg tablet; Commonly known as: Percocet;   Take 1 tablet by mouth every 6 hours if needed for severe pain (7 - 10)   for up to 5 days.     CONTINUE taking these medications     DULoxetine 30 mg DR capsule; Commonly known as: Cymbalta   fluticasone propion-salmeteroL 500-50 mcg/dose diskus inhaler; Commonly   known as: Advair Diskus   gabapentin 100 mg capsule; Commonly known as: Neurontin   HYDROcodone-acetaminophen  mg tablet; Commonly known as: Norco   ipratropium-albuteroL 0.5-2.5 mg/3 mL nebulizer solution; Commonly known   as: Duo-Neb   Klor-Con M20 20 mEq ER tablet; Generic drug: potassium chloride CR   metFORMIN 500 mg tablet; Commonly known as: Glucophage   methylPREDNISolone 4 mg tablets; Commonly known as: Medrol Dospak   montelukast 10 mg tablet; Commonly known as: Singulair   naloxone 4 mg/0.1 mL nasal spray; Commonly known as: Narcan   omega 3-dha-epa-fish oil 100-400-1,000 mg capsule   OneTouch Delica Plus Lancet 30 gauge misc; Generic drug: lancets   OneTouch Verio test strips strip; Generic drug: blood sugar diagnostic   oxyCODONE 5 mg immediate release tablet; Commonly known as: Roxicodone   pioglitazone 15 mg tablet; Commonly known as: Actos   predniSONE 10 mg tablet; Commonly known as: Deltasone   rosuvastatin 20 mg tablet; Commonly known as: Crestor   tiZANidine 2 mg capsule; Commonly known as: Zanaflex   torsemide 20 mg tablet; Commonly known as: Demadex   Trelegy Ellipta 200-62.5-25 mcg blister with device; Generic drug:   fluticasone-umeclidin-vilanter       Test Results Pending At Discharge  Pending Labs       No current pending labs.            Hospital Course   Full HP in in same day     DC to SNF    Pertinent Physical Exam At Time of Discharge  Physical Exam  Vitals  reviewed.   Constitutional:       Appearance: She is obese.   HENT:      Head: Normocephalic and atraumatic.   Cardiovascular:      Rate and Rhythm: Normal rate and regular rhythm.      Heart sounds: Normal heart sounds.   Pulmonary:      Effort: Pulmonary effort is normal.      Breath sounds: Normal air entry.   Abdominal:      General: Bowel sounds are normal.      Palpations: Abdomen is soft.      Tenderness: There is no abdominal tenderness.   Musculoskeletal:         General: Tenderness and deformity present.   Skin:     General: Skin is warm and dry.   Neurological:      General: No focal deficit present.      Mental Status: She is alert and oriented to person, place, and time.   Psychiatric:         Mood and Affect: Mood normal.         Behavior: Behavior normal.         Outpatient Follow-Up  No future appointments.      Sony Barber MD

## 2025-02-13 NOTE — H&P
"History Of Present Illness  Naz Naik is a 70 y.o. female with a past medical history of morbid obesity, T2DM, HLD, LEORA, COPD, and remote nicotine abuse who presented to the ED with left shin and left ankle pain. She reports falling just prior to arrival after walking across the street, slipping on ice going down the stairs. She reports twisting her ankle at that time, and \"spinning around\" striking her shin on the stairs at that time. She was having a hard time standing due to the pain, so she activated EMS. Notably, she underwent a left shoulder replacent on 02/06 at [facility].     Past Medical History  Past Medical History:   Diagnosis Date    COPD (chronic obstructive pulmonary disease) (Multi)     Diabetes mellitus (Multi)     Hypertension        Surgical History  History reviewed. No pertinent surgical history.     Social History  She has no history on file for tobacco use, alcohol use, and drug use.    Family History  No family history on file.     Allergies  Budesonide-formoterol    Review of Systems   Constitutional:  Negative for chills and fever.   Respiratory:  Negative for shortness of breath.    Cardiovascular:  Negative for chest pain and palpitations.   Gastrointestinal:  Negative for abdominal pain, constipation, diarrhea, nausea and vomiting.   Genitourinary:  Negative for dysuria, flank pain, frequency, hematuria and urgency.   Musculoskeletal:  Positive for arthralgias and gait problem.   All other systems reviewed and are negative.       Physical Exam  Vitals reviewed.   Constitutional:       Appearance: She is obese.   HENT:      Head: Normocephalic and atraumatic.   Cardiovascular:      Rate and Rhythm: Normal rate and regular rhythm.      Heart sounds: Normal heart sounds.   Pulmonary:      Effort: Pulmonary effort is normal.      Breath sounds: Normal air entry.   Abdominal:      General: Bowel sounds are normal.      Palpations: Abdomen is soft.      Tenderness: There is no abdominal " "tenderness.   Musculoskeletal:         General: Tenderness and deformity present.   Skin:     General: Skin is warm and dry.   Neurological:      General: No focal deficit present.      Mental Status: She is alert and oriented to person, place, and time.   Psychiatric:         Mood and Affect: Mood normal.         Behavior: Behavior normal.          Last Recorded Vitals  Blood pressure 121/50, pulse 84, temperature 36.3 °C (97.3 °F), temperature source Temporal, resp. rate 20, height 1.676 m (5' 6\"), weight 141 kg (310 lb), SpO2 98%.    Relevant Results      Lab Results   Component Value Date    WBC 14.1 (H) 04/12/2019    HGB 14.3 04/12/2019    HCT 45.3 04/12/2019    MCV 94 04/12/2019     04/12/2019     Lab Results   Component Value Date    GLUCOSE 186 (H) 04/12/2019    CALCIUM 9.9 04/12/2019     04/12/2019    K 4.5 04/12/2019    CO2 33 (H) 04/12/2019    CL 96 (L) 04/12/2019    BUN 27 (H) 04/12/2019    CREATININE 1.45 (H) 04/12/2019     XR shoulder left 2+ views  Narrative: STUDY:  XR SHOULDER MIN 2 VIEWS LEFT; 2/12/2025 at 10:35 PM.  INDICATION:  Fall, recent surgery.  COMPARISON:  None available.  ACCESSION NUMBER(S):  [id number]  ORDERING CLINICIAN:  CHRISTY LEWIS  TECHNIQUE:  Two views of the left shoulder.  FINDINGS:    The total shoulder arthroplasty is in anatomic alignment.  There is no  displaced fracture.  There is no evidence of orthopedic hardware  complications. Lateral fixation plate also noted in place. No soft  tissue abnormality is seen.  Impression: Reverse left shoulder prosthesis appears intact.  Lateral fixation plate of the proximal left humerus also intact and in  satisfactory position.  No acute findings..  Signed by Archie Hilton MD  XR ankle left 3+ views  Narrative: STUDY:  Left ankle and tibia and fibula radiographs; 2/12/2025 at 10:44 p.m.  INDICATION:  Trauma, fall.  COMPARISON:  None Available.  ACCESSION NUMBER(S):  [id number], [id number]  ORDERING " CLINICIAN:  Alissa Young  TECHNIQUE:  Three views of the left ankle and two views of the left  tibia and fibula.  FINDINGS:    Left Ankle: Mildly angulated acute fracture of the lateral malleolus.  Slight separation. No significant comminution. The alignment is  anatomic. Moderate soft tissue swelling consistent with acute injury.  Left Tibia and Fibula: Mildly  acute fracture of the lateral  malleolus again noted. No additional tibial fractures. Moderate  lateral soft tissue swelling again noted. Moderate heel spur. Moderate  degenerative joint disease of the midfoot.  Impression: Left Ankle:  ___Mildly angulated and slightly  acute fracture  of the lateral malleolus with moderate soft tissue swelling..  Left Tibia and Fibula:  ___No additional fractures of the left tibia,  or medial malleolus.  Mild tibiotalar degenerative joint disease..  Signed by Archie Hilton MD  XR tibia fibula left 2 views  Narrative: STUDY:  Left ankle and tibia and fibula radiographs; 2/12/2025 at 10:44 p.m.  INDICATION:  Trauma, fall.  COMPARISON:  None Available.  ACCESSION NUMBER(S):  [id number], [id number]  ORDERING CLINICIAN:  Alissa Young  TECHNIQUE:  Three views of the left ankle and two views of the left  tibia and fibula.  FINDINGS:    Left Ankle: Mildly angulated acute fracture of the lateral malleolus.  Slight separation. No significant comminution. The alignment is  anatomic. Moderate soft tissue swelling consistent with acute injury.  Left Tibia and Fibula: Mildly  acute fracture of the lateral  malleolus again noted. No additional tibial fractures. Moderate  lateral soft tissue swelling again noted. Moderate heel spur. Moderate  degenerative joint disease of the midfoot.  Impression: Left Ankle:  ___Mildly angulated and slightly  acute fracture  of the lateral malleolus with moderate soft tissue swelling..  Left Tibia and Fibula:  ___No additional fractures of the left  tibia,  or medial malleolus.  Mild tibiotalar degenerative joint disease..  Signed by Archie Hilton MD    ED Medication Administration from 02/12/2025 2107 to 02/13/2025 0040         Date/Time Order Dose Route Action Action by     02/12/2025 2205 EST oxyCODONE-acetaminophen (Percocet) 5-325 mg per tablet 1 tablet 1 tablet oral Given HARLEY Simon               Assessment/Plan   Assessment & Plan  Fracture of left malleolus, closed, initial encounter      #Left malleolus fracture  #Ambulatory dysfunction  -Falls precautions  -Consult ortho  -PT OT  -Percocet for pain    #T2DM  -NIDDM, A1c 6.3 1/29/25  -Regular diet  -Hold home metformin    #LEORA  -CPAP, 2L NC if CPAP not available    #HTN  #HLD  #Peripheral neuropathy  -Continue home meds pending nursing review             Yobani Jolly, ADAMS-CNP

## 2025-02-13 NOTE — ED PROVIDER NOTES
HPI   Chief Complaint   Patient presents with   • Fall     Pt fell and hurt left ankle. No LOC and did not hit head.       70-year-old female with a history of diabetes, hyperlipidemia, LEORA, COPD, former smoker presenting to the ER today with pain to her left shin and left ankle after a fall that occurred just prior to arrival.  Patient tells me that she was at her daughter's house and she left to walk across the street to her own home when she slipped on the ice going down the stairs.  Patient tells me that she twisted her ankle, spun around and hit the front of her shin against the stair.  She did not fall to the ground or injure her neck or back.  No head injury or LOC and she is not on a blood thinner.  Patient tells me that she did not have anything for pain and was having a hard time standing on the leg due to the pain so they called 911.  Patient tells me she did not injure her right leg or her arms but she recently had shoulder surgery on her left shoulder at the [facility] on 2/6.  Patient tells me she is not having any chest pain or shortness of breath.  No further complaints at this time.      History provided by:  Patient (Daughter)          Patient History   Past Medical History:   Diagnosis Date   • COPD (chronic obstructive pulmonary disease) (Multi)    • Diabetes mellitus (Multi)    • Hypertension      History reviewed. No pertinent surgical history.  No family history on file.  Social History     Tobacco Use   • Smoking status: Not on file   • Smokeless tobacco: Not on file   Substance Use Topics   • Alcohol use: Not on file   • Drug use: Not on file       Physical Exam   ED Triage Vitals [02/12/25 2117]   Temperature Heart Rate Respirations BP   36.3 °C (97.3 °F) 80 20 125/61      Pulse Ox Temp Source Heart Rate Source Patient Position   97 % Temporal Monitor Sitting      BP Location FiO2 (%)     Right arm --       Physical Exam  HENT:      Head: Normocephalic and atraumatic.   Eyes:       Conjunctiva/sclera: Conjunctivae normal.   Cardiovascular:      Rate and Rhythm: Normal rate and regular rhythm.      Pulses: Normal pulses.      Heart sounds: Normal heart sounds.      Comments: DP/PT 2+ and symmetric.  Cap refill less than 2 seconds.  Pulmonary:      Effort: Pulmonary effort is normal.      Breath sounds: Normal breath sounds.   Musculoskeletal:      Comments: No scalp or facial bony tenderness or signs of trauma.  No cervical, thoracic or lumbar spinal or paraspinal tenderness.  Tenderness on palpation about the distal tib-fib over the anterior surface with contusion.  Tenderness over the lateral malleolus with mild swelling.  No obvious dislocation.  No proximal tib-fib tenderness, no tenderness to left knee or throughout the left foot.  Superficial laceration to the medial aspect of the right thumb without bony tenderness or bony deformity.  No further tenderness or signs of bony trauma to extremities. NVI   Skin:     General: Skin is warm.      Capillary Refill: Capillary refill takes less than 2 seconds.      Comments: 1.5 cm curved superficial flap laceration over medial right thumb without active bleeding or foreign body.   Neurological:      Mental Status: She is alert and oriented to person, place, and time.      Comments: Speech normal.           ED Course & MDM   Diagnoses as of 02/13/25 0039   Closed displaced fracture of lateral malleolus of left fibula, initial encounter   Laceration of right thumb without foreign body without damage to nail, initial encounter   Fracture of left malleolus, closed, initial encounter                 No data recorded                                 Medical Decision Making  70-year-old female with history of diabetes, COPD, hyperlipidemia, LEORA, former smoker presenting to the ER today with pain to her left ankle and shin after she fell slipping on the ice just prior to arrival.  Patient denies head injury or LOC and states that when she slipped on the  ice she spun around and hit the front of her shin against the corner of the step.  She also has a cut on her thumb from the fall but denies any further injuries.  She arrives afebrile with stable vital signs.  Patient is awake alert and oriented and her speech is clear.  She is resting on exam without signs of acute distress but tells me if I touch her ankle she will have a lot of pain.  She does have decreased range of motion to the left ankle secondary to pain and she is tender over the lateral malleolus as well as the distal anterior tib with overlying contusion.  The compartment is soft and she has good distal pulses.  Low suspicion for acute compartment syndrome at this time.  She has a superficial flap laceration over the medial aspect of the right thumb but there is no bony tenderness or bony deformity to this area.  Her exam is otherwise within normal limits.  X-ray studies are ordered as well as pain medication.    X-ray today shows a mildly angulated and slightly  acute fracture of the lateral malleolus with moderate soft tissue swelling.  No other additional fractures noted.  Patient did have surgery recently on her left shoulder, they were worried if she may have messed up the joint from the fall so we did order an x-ray of the shoulder as well and there is no acute abnormality.  She did not fall directly onto the shoulder, states that her pain is at its baseline since the surgery but she wanted to make sure everything was okay.  Patient does live by herself and it would be difficult for her to get around even if we were to make the fracture partial weightbearing and as it is on the same side as her recent shoulder surgery.  The family and patient feel comfortable having her admitted here to this hospital and I did discuss possible PT/OT/rehab placement and they were okay with this plan as well.  Laceration on her right thumb was cleansed and irrigated and wound closure glue was applied with  good approximation of the skin.  Nursing staff did place the splint to the left lower extremity and patient remains neurovascularly intact after placement.  She is resting comfortably.  I did discuss case with JAIRON Rojo who accepts the patient for admission at this time.    Labs Reviewed - No data to display    XR tibia fibula left 2 views   Final Result   Left Ankle:  ___Mildly angulated and slightly  acute fracture   of the lateral malleolus with moderate soft tissue swelling..   Left Tibia and Fibula:  ___No additional fractures of the left tibia,   or medial malleolus.   Mild tibiotalar degenerative joint disease..   Signed by Archie Hilton MD      XR ankle left 3+ views   Final Result   Left Ankle:  ___Mildly angulated and slightly  acute fracture   of the lateral malleolus with moderate soft tissue swelling..   Left Tibia and Fibula:  ___No additional fractures of the left tibia,   or medial malleolus.   Mild tibiotalar degenerative joint disease..   Signed by Archie Hilton MD      XR shoulder left 2+ views   Final Result   Reverse left shoulder prosthesis appears intact.   Lateral fixation plate of the proximal left humerus also intact and in   satisfactory position.   No acute findings..   Signed by Archie Hilton MD            Procedure  Laceration Repair    Performed by: Alissa Young PA-C  Authorized by: Urbano Jacome DO    Consent:     Consent obtained:  Verbal    Consent given by:  Patient    Risks discussed:  Infection and pain  Universal protocol:     Patient identity confirmed:  Verbally with patient  Anesthesia:     Anesthesia method:  None  Laceration details:     Location:  Finger    Finger location:  R thumb    Length (cm):  1.5  Pre-procedure details:     Preparation:  Patient was prepped and draped in usual sterile fashion  Treatment:     Area cleansed with:  Chlorhexidine    Amount of cleaning:  Standard    Irrigation solution:  Sterile saline    Irrigation  method:  Syringe  Skin repair:     Repair method:  Tissue adhesive  Approximation:     Approximation:  Close  Repair type:     Repair type:  Simple  Post-procedure details:     Dressing:  Open (no dressing)       Alissa Young PA-C  02/13/25 0039

## 2025-02-13 NOTE — PROGRESS NOTES
Physical Therapy    Physical Therapy Evaluation    Patient Name: Naz Naik  MRN: [id number]  Today's Date: 2/13/2025   Time Calculation  Start Time: 1030  Stop Time: 1120  Time Calculation (min): 50 min  619/619-A    Assessment/Plan   PT Assessment  PT Assessment Results: Decreased strength, Decreased endurance, Impaired balance, Decreased mobility, Decreased coordination, Pain, Orthopedic restrictions  Rehab Prognosis: Fair  Evaluation/Treatment Tolerance: Patient limited by fatigue, Patient limited by pain  Barriers to Participation: Comorbidities  End of Session Communication: Bedside nurse, Care Coordinator  Assessment Comment: pt with decreased mobility/gait strength endurance balance .pt to benefit from skilled PT to addressd deficits and improve functional mobility  End of Session Patient Position: Up in chair, Alarm on  IP OR SWING BED PT PLAN  Inpatient or Swing Bed: Inpatient  PT Plan  Treatment/Interventions: Bed mobility, Transfer training, Gait training, Stair training, Balance training, Strengthening, Endurance training, Therapeutic exercise, Therapeutic activity  PT Plan: Ongoing PT  PT Frequency: 4 times per week  PT Discharge Recommendations: Moderate intensity level of continued care  Equipment Recommended upon Discharge:  (QC)  PT Recommended Transfer Status: Assist x2, Assistive device  PT - OK to Discharge: Yes (once medically ready for discharge to next level of care.)    Subjective     Current Problem:  1. Fracture of left malleolus, closed, initial encounter        2. Closed displaced fracture of lateral malleolus of left fibula, initial encounter        3. Laceration of right thumb without foreign body without damage to nail, initial encounter          Patient Active Problem List   Diagnosis    Fracture of left malleolus, closed, initial encounter     General Visit Information:  General  Reason for Referral: impaired mobility / weakness  Referred By: PT/ELISEO Jolly 2/13  Past Medical  History Relevant to Rehab: HLD,COPD,CPAP,LEORA, DM2, L TSR on 2/6 ,  Co-Treatment: OT  Co-Treatment Reason: to maximize pt safety  Prior to Session Communication: Bedside nurse, Care Coordinator  Patient Position Received: Bed, 3 rail up, Alarm on (pt has purwick and L LE walking boot)  General Comment: pt is a 71 yo female came to the hospital on 2/12 after a fall at home with L LE pain.  pt with dx of L malleolus fx. . test/ labs :  xray L ankle / tib/fib :  acute lateral malleolus fx.  Xray L shoulder : intact TSR . ortho consult : non sx  walking boot for LLE  WBAT . Recent  L TSR on 2/6/25  Home Living:  Home Living  Home Living Comments: pt lives alone in a 1 lvle home 4 steps with rail to enter.  pt has a tub/shower with grab bars.  daughter lives next door .  Prior Level of Function:  Prior Function Per Pt/Caregiver Report  Prior Function Comments: per pt mod I with mobility and gait . uses rollator at times in community .  mod I adls and assistance with iadls.  1 fall in last 2 months. was driving before shoulder sx.  Precautions:  Precautions  UE Weight Bearing Status: Left Non-Weight Bearing (in sling)  LE Weight Bearing Status: Weight Bearing as Tolerated (in walking boot LLE)  Medical Precautions: Fall precautions   Objective   Pain:  Pain Assessment  Pain Assessment: 0-10  0-10 (Numeric) Pain Score: 5 - Moderate pain (with standing)  Pain Type: Acute pain  Pain Location: Ankle  Pain Orientation: Left  Cognition:  Cognition  Overall Cognitive Status: Within Functional Limits  General Assessments:  Activity Tolerance  Endurance: Decreased tolerance for upright activites  Sensation  Sensation Comment: intact BLEs  Strength  Strength Comments: R LE 4/5  L ankle NA   hip /knee 4-/5  Coordination  Movements are Fluid and Coordinated: Yes  Static Sitting Balance  Static Sitting-Comment/Number of Minutes: fair  Dynamic Sitting Balance  Dynamic Sitting-Comments: fair  Static Standing Balance  Static  Standing-Comment/Number of Minutes: fair -  Dynamic Standing Balance  Dynamic Standing-Comments: poor +  Functional Assessments:  Bed Mobility  Bed Mobility: Yes  Bed Mobility 1  Bed Mobility 1: Supine to sitting  Level of Assistance 1: Minimum assistance, Minimal verbal cues  Bed Mobility Comments 1: min A to EOB . with increased time to complete task  Transfers  Transfer: Yes  Transfer 1  Technique 1: Sit to stand, Stand to sit  Transfer Device 1: Cane (QC and L walking boot)  Transfer Level of Assistance 1: Moderate assistance, +2, Minimal verbal cues  Trials/Comments 1: mod A x 2 with QC  gait belt.  cues to push up and reach back with transfers.  increaesd time to complete task  Ambulation/Gait Training  Ambulation/Gait Training Performed: Yes  Ambulation/Gait Training 1  Surface 1: Level tile  Device 1: Small base quad cane  Gait Support Devices:  (walking boot L LE)  Assistance 1: Moderate assistance  Quality of Gait 1: Inconsistent stride length, Decreased step length, Antalgic, Shuffling gait  Comments/Distance (ft) 1: 5 steps with QC mod A x 2 . cues to move cane and foot.  short steps . increased time to complete task  Extremity/Trunk Assessments:  RLE   RLE : Within Functional Limits  LLE   LLE :  (ANkle limited  fx)  Outcome Measures:  [facility] Basic Mobility  Turning from your back to your side while in a flat bed without using bedrails: A little  Moving from lying on your back to sitting on the side of a flat bed without using bedrails: A little  Moving to and from bed to chair (including a wheelchair): A lot  Standing up from a chair using your arms (e.g. wheelchair or bedside chair): A lot  To walk in hospital room: A lot  Climbing 3-5 steps with railing: Total  Basic Mobility - Total Score: 13  Goals:  Encounter Problems       Encounter Problems (Active)       PT Problem       Pt will demonstrate SUP with bed mobility to edge of bed.         Start:  02/13/25    Expected End:  02/27/25            Pt  will demonstrate CGA with sit to stand/chair transfers with QC.         Start:  02/13/25    Expected End:  02/27/25            Pt will ambulate 20 feet with QC CGA .         Start:  02/13/25    Expected End:  02/27/25            Pt to demo improved BLE strength by being able to complete supine/seated thera ex 2x20 BLEs with 4 or less rest breaks .         Start:  02/13/25    Expected End:  02/27/25                 Education Documentation  Mobility Training, taught by Leo Munoz, PT at 2/13/2025 12:02 PM.  Learner: Patient  Readiness: Acceptance  Method: Explanation  Response: Verbalizes Understanding    Education Comments  No comments found.

## 2025-02-13 NOTE — CONSULTS
Reason For Consult  Left ankle injury.    History Of Present Illness  Naz Naik is a 70 y.o. female presenting with left ankle pain as result of a fall..  Patient states that she slipped on some ice going down the stairs twisting her ankle felt a pop complained of severe pain and inability bear weight presented to the emergency room was found to have a ankle fracture dislocation.  Dislocation was reduced in the ER and patient was splinted orthopedics was consulted of note she underwent a left shoulder replacement on February 6 at the [facility].     Past Medical History  She has a past medical history of COPD (chronic obstructive pulmonary disease) (Multi), Diabetes mellitus (Multi), and Hypertension.    Surgical History  She has no past surgical history on file.     Social History  She reports that she quit smoking about 5 years ago. Her smoking use included cigarettes. She started smoking about 50 years ago. She has a 67.5 pack-year smoking history. She has been exposed to tobacco smoke. She has never used smokeless tobacco. No history on file for alcohol use and drug use.    Family History  No family history on file.     Allergies  Budesonide-formoterol    Review of Systems  12 point review of system negative other than as noted     Physical Exam  Physical Exam  Vitals reviewed.   Constitutional:       Appearance: She is obese.   HENT:      Head: Normocephalic and atraumatic.   Cardiovascular:      Rate and Rhythm: Normal rate and regular rhythm.      Heart sounds: Normal heart sounds.   Pulmonary:      Effort: Pulmonary effort is normal.      Breath sounds: Normal air entry.   Abdominal:      General: Bowel sounds are normal.      Palpations: Abdomen is soft.      Tenderness: There is no abdominal tenderness.   Musculoskeletal:         General: Tenderness and deformity present.   Skin:     General: Skin is warm and dry.   Neurological:      General: No focal deficit present.      Mental Status: She is  "alert and oriented to person, place, and time.   Psychiatric:         Mood and Affect: Mood normal.         Behavior: Behavior normal.   Left lower extremity is in a splint toes are pink she has brisk cap refill no pain with passive range of motion obvious deformity        Last Recorded Vitals  Blood pressure 127/65, pulse 83, temperature 35.8 °C (96.4 °F), resp. rate 20, height 1.676 m (5' 6\"), weight 141 kg (310 lb), SpO2 97%.    Relevant Results  X-rays pre and postreduction are reviewed showing ankle fracture with minimally displaced lateral malleolus fracture l     Assessment/Plan     Patient with severe impairment of bodily function related to her ankle injury surgical nonsurgical options were discussed recommend tall walking boot weight-bear as tolerated in the boot with a walker/crutches follow-up in the office 7 to 10 days for repeat x-ray.      "

## 2025-02-13 NOTE — PROGRESS NOTES
02/13/25 1058   Discharge Planning   Living Arrangements Alone   Support Systems Children  (daughter lives across the street)   Assistance Needed PTA Ind ADLS and IADLS no AD, drives, fell on icy step with left ankle fx, and just had left shoulder surgery on 2/6, denies other falls last 3 months, Owns walker, cane shower bench, tub shower, grab bars   Type of Residence Private residence  (1 level home, [facility])   Number of Stairs to Enter Residence 4   Number of Stairs Within Residence 2   Do you have animals or pets at home? Yes   Type of Animals or Pets 3 Cats   Home or Post Acute Services Post acute facilities (Rehab/SNF/etc)   Type of Post Acute Facility Services Rehab;Skilled nursing   Expected Discharge Disposition SNF   Does the patient need discharge transport arranged? Yes   RoundTrip coordination needed? Yes   Has discharge transport been arranged? No   Financial Resource Strain   How hard is it for you to pay for the very basics like food, housing, medical care, and heating? Not hard   Housing Stability   In the last 12 months, was there a time when you were not able to pay the mortgage or rent on time? N   In the past 12 months, how many times have you moved where you were living? 0   At any time in the past 12 months, were you homeless or living in a shelter (including now)? N   Transportation Needs   In the past 12 months, has lack of transportation kept you from medical appointments or from getting medications? no   In the past 12 months, has lack of transportation kept you from meetings, work, or from getting things needed for daily living? No   Patient Choice   Provider Choice list and CMS website (https://medicare.gov/care-compare#search) for post-acute Quality and Resource Measure Data were provided and reviewed with: Patient   Patient / Family choosing to utilize agency / facility established prior to hospitalization No   Stroke Family Assessment   Stroke Family  Assessment Needed No   Intensity of Service   Intensity of Service 0-30 min     Pt admitted after slip and fall on ice with Left closed fracture of Left malleolus. Per Orthopedics, pt will be treated non-operatively in tall walking boot and will be weight bearing as tolerated in boot. PT/OT currently in room working with pt and states that they are going to recommend SNF. Pt not only has left ankle fx but also just had Left shoulder surgery on 2/6/25. Pt is in agreement to SNF, provided SNF list that she will review for FOC. CT team will monitor case for progression and follow up for SNF FOC.    Update: Revisited with pt regarding SNF FOC. Pt states choices are #1 [facility], #2 [facility], and #3 [facility]. Referrals built and sent, awaiting response.    Update: [facility] accepted, team notified to start auth and auth was received: Annmarie Candelario Request Status: Approved  Auth. # [id number]  Dates: 2/13/2025 - 2/19/2025   [facility] able to accept at any time today. Attending notified, awaiting DC order and [place].

## 2025-02-14 ENCOUNTER — NURSING HOME VISIT (OUTPATIENT)
Dept: POST ACUTE CARE | Facility: EXTERNAL LOCATION | Age: 70
End: 2025-02-14
Payer: MEDICARE

## 2025-02-14 VITALS
WEIGHT: 293 LBS | RESPIRATION RATE: 18 BRPM | HEIGHT: 66 IN | SYSTOLIC BLOOD PRESSURE: 123 MMHG | OXYGEN SATURATION: 97 % | DIASTOLIC BLOOD PRESSURE: 64 MMHG | HEART RATE: 78 BPM | TEMPERATURE: 97.9 F | BODY MASS INDEX: 47.09 KG/M2

## 2025-02-14 DIAGNOSIS — E66.01 MORBID OBESITY (MULTI): ICD-10-CM

## 2025-02-14 DIAGNOSIS — Z96.612 S/P REVERSE TOTAL SHOULDER ARTHROPLASTY, LEFT: ICD-10-CM

## 2025-02-14 DIAGNOSIS — J44.9 CHRONIC OBSTRUCTIVE PULMONARY DISEASE, UNSPECIFIED COPD TYPE (MULTI): ICD-10-CM

## 2025-02-14 DIAGNOSIS — R53.81 PHYSICAL DEBILITY: ICD-10-CM

## 2025-02-14 DIAGNOSIS — G47.33 OBSTRUCTIVE SLEEP APNEA: ICD-10-CM

## 2025-02-14 DIAGNOSIS — S82.892A: Primary | ICD-10-CM

## 2025-02-14 DIAGNOSIS — I10 PRIMARY HYPERTENSION: ICD-10-CM
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
## 2025-02-14 DIAGNOSIS — E11.9 TYPE 2 DIABETES MELLITUS WITHOUT COMPLICATION, WITHOUT LONG-TERM CURRENT USE OF INSULIN (MULTI): ICD-10-CM

## 2025-02-14 PROCEDURE — 99306 1ST NF CARE HIGH MDM 50: CPT | Performed by: INTERNAL MEDICINE

## 2025-02-14 PROCEDURE — 2500000004 HC RX 250 GENERAL PHARMACY W/ HCPCS (ALT 636 FOR OP/ED): Performed by: NURSE PRACTITIONER

## 2025-02-14 PROCEDURE — G0378 HOSPITAL OBSERVATION PER HR: HCPCS

## 2025-02-14 PROCEDURE — 2500000001 HC RX 250 WO HCPCS SELF ADMINISTERED DRUGS (ALT 637 FOR MEDICARE OP): Performed by: STUDENT IN AN ORGANIZED HEALTH CARE EDUCATION/TRAINING PROGRAM

## 2025-02-14 RX ADMIN — PANTOPRAZOLE SODIUM 40 MG: 40 TABLET, DELAYED RELEASE ORAL at 06:29

## 2025-02-14 RX ADMIN — GABAPENTIN 100 MG: 100 CAPSULE ORAL at 08:56

## 2025-02-14 RX ADMIN — HEPARIN SODIUM 7500 UNITS: 5000 INJECTION INTRAVENOUS; SUBCUTANEOUS at 06:28

## 2025-02-14 RX ADMIN — DULOXETINE 90 MG: 30 CAPSULE, DELAYED RELEASE ORAL at 08:55

## 2025-02-14 RX ADMIN — DOCUSATE SODIUM 100 MG: 100 CAPSULE, LIQUID FILLED ORAL at 08:56

## 2025-02-14 NOTE — LETTER
Patient: Naz Naik  : 1955    Encounter Date: 2025    Subjective  Patient ID: Naz Naik is a 70 y.o. female who is acute skilled care being seen and evaluated for multiple medical problems.    HPI   This is a 70-year-old female patient with history of diabetes mellitus type 2, hyperlipidemia, obstructive sleep apnea, COPD, and morbid obesity.  She is status post left reverse total shoulder arthroplasty on 2025 at the [facility].  This is apparently the third left shoulder arthroplasty following 2 revisions for arthroplasty loosening and failure.  Whereas no culture was positive she has been placed empirically on dual antibiotic therapy with Augmentin and doxycycline to cover any potential infectious etiology relating to the patient's recurrent loosening of her prostheses.  She fell on the ice while home recovering from shoulder surgery and sustained a fracture of the left lateral malleolus prompting admission to the hospital.  She of course has physical challenges now because she can be nonweightbearing with the left shoulder and has fracture of the left lateral malleolus.  The fracture was felt to be nonoperative in nature and she was placed in a walking boot and was free to weight-bear as tolerated per orthopedic surgery.  Today she is resting comfortably in bed and has no complaints of pain or shortness of breath to me.  She is concerned about how she will be able to get around since she cannot weight-bear with her left upper extremity.  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    Current medications:  Airsupra  Albuterol metered-dose inhaler  Duloxetine  Fish oil  Flonase  Lasix  Gabapentin  Robitussin AC  DuoNeb  Metformin  Montelukast  Oxycodone  Actos  Crestor  Tizanidine  Trelegy Ellipta  Augmentin  Doxycycline  Protonix  Aspirin  Colace    Laboratory examination from  February 13, 2025:  Potassium 3.6  Bicarbonate 26  Creatinine 0.90  GFR 68  White blood cell 9.2  Hemoglobin 9.3-preop hemoglobin 14.3  Platelet 303    Review of Systems   Constitutional:  Negative for chills, diaphoresis and fever.   Respiratory:  Negative for cough and shortness of breath.    Cardiovascular:  Negative for chest pain and leg swelling.   Gastrointestinal:  Negative for constipation, diarrhea, nausea and vomiting.   Musculoskeletal:  Negative for joint swelling and myalgias.        Postop discomfort       Objective  Pulse 72   Resp 18     Physical Exam  Vitals reviewed.   Constitutional:       General: She is not in acute distress.     Appearance: She is obese. She is not ill-appearing.   Cardiovascular:      Rate and Rhythm: Normal rate and regular rhythm.      Pulses: Normal pulses.      Heart sounds:      No gallop.   Pulmonary:      Breath sounds: Normal breath sounds. No wheezing, rhonchi or rales.   Abdominal:      General: Abdomen is flat. Bowel sounds are normal.      Palpations: Abdomen is soft.      Tenderness: There is no guarding or rebound.   Musculoskeletal:      Right lower leg: No edema.      Left lower leg: No edema.      Comments: Patient is in a left shoulder immobilizer and is in a left walking boot.         Assessment/Plan  Problem List Items Addressed This Visit             ICD-10-CM    Fracture of left malleolus, closed, initial encounter - Primary S82.892A    Accidental fall W19.XXXA    S/P reverse total shoulder arthroplasty, left Z96.612    Primary hypertension I10    Type 2 diabetes mellitus without complication, without long-term current use of insulin (Multi) E11.9    COPD (chronic obstructive pulmonary disease) (Multi) J44.9    Morbid obesity (Multi) E66.01    Obstructive sleep apnea G47.33    Physical debility R53.81     A.  We will continue with rehabilitative restorative and supportive care as patient tolerates    B.  Disposition will be pending response to  rehabilitation overall assessment patient safety awareness.  As mentioned above barrier to progress will be the patient's nonweightbearing status to her left upper extremity due to recent revision left reverse total shoulder arthroplasty.    C.  Will continue the patient's antimicrobial therapy as recommended by her orthopedic surgeon.    D.  The patient should have close outpatient follow-up with orthopedic surgeon for the fibular fracture to document its healing.  In the meantime she is reportedly weightbearing as tolerated with the walking boot on the left lower extremity.    E.  Laboratory examinations will be repeated on an ongoing as-needed basis should she remain here longer term care    F.  The patient's prognosis is guarded.        Electronically Signed By: Krish Cervantes MD   2/19/25  9:43 AM

## 2025-02-14 NOTE — PROGRESS NOTES
02/14/25 0900   Discharge Planning   Expected Discharge Disposition SNF   Does the patient need discharge transport arranged? Yes   RoundTrip coordination needed? Yes   Has discharge transport been arranged? Yes   What day is the transport expected? 02/14/25   What time is the transport expected? 1000     [facility]ed [facility] who had originally confirmed transport for 2/13 at 4:45 PM but were delayed, and they state plan pickup time of 10:00 AM 2/14/25. [facility] updated.

## 2025-02-14 NOTE — CARE PLAN
Problem: Pain - Adult  Goal: Verbalizes/displays adequate comfort level or baseline comfort level  Outcome: Progressing     Problem: Safety - Adult  Goal: Free from fall injury  Outcome: Progressing     Problem: Discharge Planning  Goal: Discharge to home or other facility with appropriate resources  Outcome: Progressing     Problem: Chronic Conditions and Co-morbidities  Goal: Patient's chronic conditions and co-morbidity symptoms are monitored and maintained or improved  Outcome: Progressing     Problem: Nutrition  Goal: Nutrient intake appropriate for maintaining nutritional needs  Outcome: Progressing   The patient's goals for the shift include      The clinical goals for the shift include improve mobility    Over the shift, the patient did not make progress toward the following goals. Barriers to progression include . Recommendations to address these barriers include .

## 2025-02-17 VITALS — HEART RATE: 72 BPM | RESPIRATION RATE: 18 BRPM

## 2025-02-17 NOTE — PROGRESS NOTES
Subjective   Patient ID: Naz Naik is a 70 y.o. female who is acute skilled care being seen and evaluated for multiple medical problems.    HPI   This is a 70-year-old female patient with history of diabetes mellitus type 2, hyperlipidemia, obstructive sleep apnea, COPD, and morbid obesity.  She is status post left reverse total shoulder arthroplasty on February 6, 2025 at the [facility].  This is apparently the third left shoulder arthroplasty following 2 revisions for arthroplasty loosening and failure.  Whereas no culture was positive she has been placed empirically on dual antibiotic therapy with Augmentin and doxycycline to cover any potential infectious etiology relating to the patient's recurrent loosening of her prostheses.  She fell on the ice while home recovering from shoulder surgery and sustained a fracture of the left lateral malleolus prompting admission to the hospital.  She of course has physical challenges now because she can be nonweightbearing with the left shoulder and has fracture of the left lateral malleolus.  The fracture was felt to be nonoperative in nature and she was placed in a walking boot and was free to weight-bear as tolerated per orthopedic surgery.  Today she is resting comfortably in bed and has no complaints of pain or shortness of breath to me.  She is concerned about how she will be able to get around since she cannot weight-bear with her left upper extremity.  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    Current medications:  Airsupra  Albuterol metered-dose inhaler  Duloxetine  Fish oil  Flonase  Lasix  Gabapentin  Robitussin AC  DuoNeb  Metformin  Montelukast  Oxycodone  Actos  Crestor  Tizanidine  Trelegy Ellipta  Augmentin  Doxycycline  Protonix  Aspirin  Colace    Laboratory examination from February 13, 2025:  Potassium 3.6  Bicarbonate 26  Creatinine 0.90  GFR  68  White blood cell 9.2  Hemoglobin 9.3-preop hemoglobin 14.3  Platelet 303    Review of Systems   Constitutional:  Negative for chills, diaphoresis and fever.   Respiratory:  Negative for cough and shortness of breath.    Cardiovascular:  Negative for chest pain and leg swelling.   Gastrointestinal:  Negative for constipation, diarrhea, nausea and vomiting.   Musculoskeletal:  Negative for joint swelling and myalgias.        Postop discomfort       Objective   Pulse 72   Resp 18     Physical Exam  Vitals reviewed.   Constitutional:       General: She is not in acute distress.     Appearance: She is obese. She is not ill-appearing.   Cardiovascular:      Rate and Rhythm: Normal rate and regular rhythm.      Pulses: Normal pulses.      Heart sounds:      No gallop.   Pulmonary:      Breath sounds: Normal breath sounds. No wheezing, rhonchi or rales.   Abdominal:      General: Abdomen is flat. Bowel sounds are normal.      Palpations: Abdomen is soft.      Tenderness: There is no guarding or rebound.   Musculoskeletal:      Right lower leg: No edema.      Left lower leg: No edema.      Comments: Patient is in a left shoulder immobilizer and is in a left walking boot.         Assessment/Plan   Problem List Items Addressed This Visit             ICD-10-CM    Fracture of left malleolus, closed, initial encounter - Primary S82.892A    Accidental fall W19.XXXA    S/P reverse total shoulder arthroplasty, left Z96.612    Primary hypertension I10    Type 2 diabetes mellitus without complication, without long-term current use of insulin (Multi) E11.9    COPD (chronic obstructive pulmonary disease) (Multi) J44.9    Morbid obesity (Multi) E66.01    Obstructive sleep apnea G47.33    Physical debility R53.81     A.  We will continue with rehabilitative restorative and supportive care as patient tolerates    B.  Disposition will be pending response to rehabilitation overall assessment patient safety awareness.  As mentioned above  barrier to progress will be the patient's nonweightbearing status to her left upper extremity due to recent revision left reverse total shoulder arthroplasty.    C.  Will continue the patient's antimicrobial therapy as recommended by her orthopedic surgeon.    D.  The patient should have close outpatient follow-up with orthopedic surgeon for the fibular fracture to document its healing.  In the meantime she is reportedly weightbearing as tolerated with the walking boot on the left lower extremity.    E.  Laboratory examinations will be repeated on an ongoing as-needed basis should she remain here longer term care    F.  The patient's prognosis is guarded.

## 2025-02-19 PROBLEM — E66.01 MORBID OBESITY (MULTI): Status: ACTIVE | Noted: 2025-02-19

## 2025-02-19 PROBLEM — I10 PRIMARY HYPERTENSION: Status: ACTIVE | Noted: 2025-02-19

## 2025-02-19 PROBLEM — G47.33 OBSTRUCTIVE SLEEP APNEA: Status: ACTIVE | Noted: 2025-02-19
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
## 2025-02-19 PROBLEM — E11.9 TYPE 2 DIABETES MELLITUS WITHOUT COMPLICATION, WITHOUT LONG-TERM CURRENT USE OF INSULIN (MULTI): Status: ACTIVE | Noted: 2025-02-19

## 2025-02-19 PROBLEM — Z96.612 S/P REVERSE TOTAL SHOULDER ARTHROPLASTY, LEFT: Status: ACTIVE | Noted: 2025-02-19

## 2025-02-19 PROBLEM — R53.81 PHYSICAL DEBILITY: Status: ACTIVE | Noted: 2025-02-19

## 2025-02-19 PROBLEM — J44.9 COPD (CHRONIC OBSTRUCTIVE PULMONARY DISEASE) (MULTI): Status: ACTIVE | Noted: 2025-02-19

## 2025-02-19 RX ORDER — MONTELUKAST SODIUM 10 MG/1
10 TABLET ORAL NIGHTLY
Qty: 90 TABLET | Refills: 3 | Status: SHIPPED | OUTPATIENT
Start: 2025-02-19

## 2025-02-19 ASSESSMENT — ENCOUNTER SYMPTOMS
VOMITING: 0
JOINT SWELLING: 0
COUGH: 0
MYALGIAS: 0
NAUSEA: 0
SHORTNESS OF BREATH: 0
DIARRHEA: 0
CHILLS: 0
DIAPHORESIS: 0
CONSTIPATION: 0
FEVER: 0

## 2025-04-07 DIAGNOSIS — E11.9 TYPE 2 DIABETES MELLITUS WITHOUT COMPLICATION, WITHOUT LONG-TERM CURRENT USE OF INSULIN: ICD-10-CM

## 2025-04-07 DIAGNOSIS — E11.40 TYPE 2 DIABETES MELLITUS WITH DIABETIC NEUROPATHY, WITHOUT LONG-TERM CURRENT USE OF INSULIN (HCC): ICD-10-CM

## 2025-04-07 RX ORDER — PIOGLITAZONE 15 MG/1
15 TABLET ORAL DAILY
Qty: 90 TABLET | Refills: 3 | Status: SHIPPED | OUTPATIENT
Start: 2025-04-07

## 2025-04-07 RX ORDER — BLOOD SUGAR DIAGNOSTIC
STRIP MISCELLANEOUS
Qty: 200 STRIP | Refills: 10 | Status: SHIPPED | OUTPATIENT
Start: 2025-04-07

## 2025-04-14 DIAGNOSIS — E11.40 TYPE 2 DIABETES MELLITUS WITH DIABETIC NEUROPATHY, WITHOUT LONG-TERM CURRENT USE OF INSULIN (HCC): ICD-10-CM

## 2025-04-15 RX ORDER — PIOGLITAZONE 15 MG/1
15 TABLET ORAL DAILY
Qty: 90 TABLET | Refills: 3 | Status: SHIPPED | OUTPATIENT
Start: 2025-04-15

## 2025-06-16 ENCOUNTER — TELEPHONE (OUTPATIENT)
Dept: PRIMARY CARE CLINIC | Age: 70
End: 2025-06-16

## 2025-06-16 DIAGNOSIS — E11.40 TYPE 2 DIABETES MELLITUS WITH DIABETIC NEUROPATHY, WITHOUT LONG-TERM CURRENT USE OF INSULIN (HCC): ICD-10-CM

## 2025-06-16 NOTE — TELEPHONE ENCOUNTER
Rx request   Requested Prescriptions     Pending Prescriptions Disp Refills    metFORMIN (GLUCOPHAGE) 500 MG tablet 90 tablet 3     Sig: Take 1 tablet by mouth daily (with breakfast)     LOV 2/12/2025  Next Visit Date:  Future Appointments   Date Time Provider Department Center   8/12/2025 10:30 AM Mirlande Fournier MD [facility]

## 2025-06-17 DIAGNOSIS — Z12.31 VISIT FOR SCREENING MAMMOGRAM: Primary | ICD-10-CM

## 2025-07-03 ENCOUNTER — HOSPITAL ENCOUNTER (OUTPATIENT)
Dept: WOMENS IMAGING | Age: 70
Discharge: HOME | End: 2025-07-05
Payer: MEDICARE

## 2025-07-03 DIAGNOSIS — Z12.31 VISIT FOR SCREENING MAMMOGRAM: ICD-10-CM

## 2025-07-03 PROCEDURE — 77063 BREAST TOMOSYNTHESIS BI: CPT

## 2025-07-05 ENCOUNTER — RESULTS FOLLOW-UP (OUTPATIENT)
Dept: PRIMARY CARE CLINIC | Age: 70
End: 2025-07-05

## 2025-07-07 ENCOUNTER — OFFICE VISIT (OUTPATIENT)
Dept: PRIMARY CARE CLINIC | Age: 70
End: 2025-07-07
Payer: MEDICARE

## 2025-07-07 VITALS
TEMPERATURE: 97.3 F | DIASTOLIC BLOOD PRESSURE: 78 MMHG | WEIGHT: 293 LBS | BODY MASS INDEX: 48.82 KG/M2 | OXYGEN SATURATION: 95 % | HEART RATE: 89 BPM | RESPIRATION RATE: 18 BRPM | HEIGHT: 65 IN | SYSTOLIC BLOOD PRESSURE: 122 MMHG

## 2025-07-07 DIAGNOSIS — F22 DELUSIONS (HCC): ICD-10-CM

## 2025-07-07 DIAGNOSIS — Z12.11 SPECIAL SCREENING FOR MALIGNANT NEOPLASMS, COLON: ICD-10-CM

## 2025-07-07 DIAGNOSIS — R29.6 RECURRENT FALLS: Primary | ICD-10-CM

## 2025-07-07 DIAGNOSIS — K51.40 PSEUDOPOLYPOSIS OF COLON WITHOUT COMPLICATION, UNSPECIFIED PART OF COLON (HCC): ICD-10-CM

## 2025-07-07 DIAGNOSIS — S82.62XD CLOSED FRACTURE OF PROXIMAL LATERAL MALLEOLUS OF LEFT FIBULA WITH ROUTINE HEALING, SUBSEQUENT ENCOUNTER: ICD-10-CM

## 2025-07-07 DIAGNOSIS — Z96.612 S/P REVERSE TOTAL SHOULDER ARTHROPLASTY, LEFT: ICD-10-CM

## 2025-07-07 DIAGNOSIS — E11.40 TYPE 2 DIABETES MELLITUS WITH DIABETIC NEUROPATHY, WITHOUT LONG-TERM CURRENT USE OF INSULIN (HCC): ICD-10-CM

## 2025-07-07 DIAGNOSIS — I50.9 CONGESTIVE HEART FAILURE, UNSPECIFIED HF CHRONICITY, UNSPECIFIED HEART FAILURE TYPE (HCC): ICD-10-CM

## 2025-07-07 PROCEDURE — 1159F MED LIST DOCD IN RCRD: CPT | Performed by: INTERNAL MEDICINE

## 2025-07-07 PROCEDURE — 3044F HG A1C LEVEL LT 7.0%: CPT | Performed by: INTERNAL MEDICINE

## 2025-07-07 PROCEDURE — 99214 OFFICE O/P EST MOD 30 MIN: CPT | Performed by: INTERNAL MEDICINE

## 2025-07-07 PROCEDURE — 1160F RVW MEDS BY RX/DR IN RCRD: CPT | Performed by: INTERNAL MEDICINE

## 2025-07-07 PROCEDURE — G2211 COMPLEX E/M VISIT ADD ON: HCPCS | Performed by: INTERNAL MEDICINE

## 2025-07-07 PROCEDURE — 1123F ACP DISCUSS/DSCN MKR DOCD: CPT | Performed by: INTERNAL MEDICINE

## 2025-07-07 ASSESSMENT — ENCOUNTER SYMPTOMS
VOMITING: 0
COUGH: 0
SHORTNESS OF BREATH: 0
ABDOMINAL PAIN: 0
NAUSEA: 0
WHEEZING: 0
DIARRHEA: 0

## 2025-07-07 NOTE — PROGRESS NOTES
Subjective:      Patient ID: Carrie Clark is a 70 y.o. female    Follow up   HPI  Pt presents for follow up. Left malleolar closed fracture after slipping on ice in 2025. S/p ORIF.      S/p 3 L shoulder reverse replacement(last one in 2025). Follows with ortho. Repeat XR demonstrates appropriate positioning of the prosthesis.      Three falls in the past week at home. No antecedent dizziness. Seen in the ER for the last fall. Imaging showed no fracture. Right knee pain controlled on APAP. No leg weakness.     T2DM controlled on glimepiride and Actos.   Hemoglobin A1C (%)   Date Value   2025 5.9   2024 6.3 (H)   11/15/2023 6.0 (H)   2023 7.5 (H)   2023 7.5 (H)     CHF-not in exacerbation. Diastolic dysfunction EF 60-65%.     Pseudopolyposis-no such seen in  colonoscopy.     Delusions- no such symptoms. No hallucinations Depression controlled on Cymbalta. No SI.    Past Medical History:   Diagnosis Date    Asthma     COPD (chronic obstructive pulmonary disease) (HCC)     Depression     Diabetes mellitus (HCC)     Sleep apnea      Past Surgical History:   Procedure Laterality Date    CHOLECYSTECTOMY      COLONOSCOPY N/A 2021    COLORECTAL CANCER SCREENING with polypectomies performed by Leo Moreau MD at [facility]     Social History     Socioeconomic History    Marital status:      Spouse name: Not on file    Number of children: Not on file    Years of education: Not on file    Highest education level: Not on file   Occupational History    Not on file   Tobacco Use    Smoking status: Former     Current packs/day: 0.00     Average packs/day: 1 pack/day for 40.0 years (40.0 ttl pk-yrs)     Types: Cigarettes     Start date:      Quit date:      Years since quittin.5    Smokeless tobacco: Former     Quit date:    Vaping Use    Vaping status: Never Used   Substance and Sexual Activity    Alcohol use: Not Currently     Comment: occass    Drug use:

## 2025-07-14 ENCOUNTER — TELEPHONE (OUTPATIENT)
Dept: PRIMARY CARE CLINIC | Age: 70
End: 2025-07-14

## 2025-07-14 DIAGNOSIS — J44.89 ASTHMA-COPD OVERLAP SYNDROME (HCC): ICD-10-CM

## 2025-07-14 NOTE — TELEPHONE ENCOUNTER
Cristina from [facility] called as a courtesy to let you know they started her HHC on 7/11 and that she is going to only be getting P/T with them.

## 2025-07-14 NOTE — TELEPHONE ENCOUNTER
Rx requested:  Requested Prescriptions     Pending Prescriptions Disp Refills    fluticasone-umeclidin-vilant (TRELEGY ELLIPTA) 200-62.5-25 MCG/ACT AEPB inhaler 1 each 3     Sig: Inhale 1 puff into the lungs daily       Last Office Visit:   1/15/2025      Next Visit Date:  Future Appointments   Date Time Provider Department Center   8/4/2025 11:30 AM Chirag Mensah MD [facility]   1/7/2026 10:45 AM Mirlande Fournier MD [facility]      R requested:  Requested Prescriptions     Pending Prescriptions Disp Refills    fluticasone-umeclidin-vilant (TRELEGY ELLIPTA) 200-62.5-25 MCG/ACT AEPB inhaler 1 each 3     Sig: Inhale 1 puff into the lungs daily       Last Office Visit:   1/15/2025      Next Visit Date:  Future Appointments   Date Time Provider Department Center   1/7/2026 10:45 AM Mirlande Fournier MD [facility]

## 2025-07-14 NOTE — TELEPHONE ENCOUNTER
Greg with [facility] ([phone])  Needs to report that Carrie was coming in from outside and tripped over threshold and fell.  Injured right should and rt knee  4/5 pain using tylenol for pain  - some bruising and using ice for swelling  Small bump on heap where she hit her head no loss of conscienceness

## 2025-07-15 ENCOUNTER — HOSPITAL ENCOUNTER (OUTPATIENT)
Dept: CT IMAGING | Age: 70
Discharge: HOME | End: 2025-07-17
Payer: MEDICARE

## 2025-07-15 ENCOUNTER — OFFICE VISIT (OUTPATIENT)
Dept: PRIMARY CARE CLINIC | Age: 70
End: 2025-07-15
Payer: MEDICARE

## 2025-07-15 ENCOUNTER — HOSPITAL ENCOUNTER (OUTPATIENT)
Dept: GENERAL RADIOLOGY | Age: 70
Discharge: HOME | End: 2025-07-17
Payer: MEDICARE

## 2025-07-15 VITALS
BODY MASS INDEX: 48.82 KG/M2 | WEIGHT: 293 LBS | HEART RATE: 87 BPM | DIASTOLIC BLOOD PRESSURE: 80 MMHG | SYSTOLIC BLOOD PRESSURE: 128 MMHG | TEMPERATURE: 98 F | HEIGHT: 65 IN | OXYGEN SATURATION: 98 %

## 2025-07-15 DIAGNOSIS — R29.6 RECURRENT FALLS: ICD-10-CM

## 2025-07-15 DIAGNOSIS — S89.90XA KNEE INJURY, INITIAL ENCOUNTER: ICD-10-CM

## 2025-07-15 DIAGNOSIS — R29.6 RECURRENT FALLS: Primary | ICD-10-CM

## 2025-07-15 PROCEDURE — 70450 CT HEAD/BRAIN W/O DYE: CPT

## 2025-07-15 PROCEDURE — 73030 X-RAY EXAM OF SHOULDER: CPT

## 2025-07-15 PROCEDURE — 1160F RVW MEDS BY RX/DR IN RCRD: CPT | Performed by: INTERNAL MEDICINE

## 2025-07-15 PROCEDURE — 99214 OFFICE O/P EST MOD 30 MIN: CPT | Performed by: INTERNAL MEDICINE

## 2025-07-15 PROCEDURE — 73700 CT LOWER EXTREMITY W/O DYE: CPT

## 2025-07-15 PROCEDURE — G2211 COMPLEX E/M VISIT ADD ON: HCPCS | Performed by: INTERNAL MEDICINE

## 2025-07-15 PROCEDURE — 1159F MED LIST DOCD IN RCRD: CPT | Performed by: INTERNAL MEDICINE

## 2025-07-15 PROCEDURE — 1123F ACP DISCUSS/DSCN MKR DOCD: CPT | Performed by: INTERNAL MEDICINE

## 2025-07-15 RX ORDER — FLUTICASONE FUROATE, UMECLIDINIUM BROMIDE AND VILANTEROL TRIFENATATE 200; 62.5; 25 UG/1; UG/1; UG/1
1 POWDER RESPIRATORY (INHALATION) DAILY
Qty: 1 EACH | Refills: 3 | Status: SHIPPED | OUTPATIENT
Start: 2025-07-15

## 2025-07-15 NOTE — PROGRESS NOTES
Head:      Comments: Left frontal ecchymosis   Cardiovascular:      Heart sounds: S1 normal and S2 normal.   Musculoskeletal:        Legs:       Comments: B/l anterior knee swelling, erythema and marked TTP(R>)). Soft anterior knee oval-shaped lump (10 x 8cm). Right knee ROM normal in flexion and extension.    Neurological:      Mental Status: She is alert.       Assessment:       Diagnosis Orders   1. Recurrent falls Inadequately Controlled CT KNEE LEFT WO CONTRAST    CT KNEE RIGHT WO CONTRAST    declines cohabitation with her daughter who lives across the street from her  will continue PT and make appt with neuro and ortho      2. Knee injury, initial encounter  CT KNEE LEFT WO CONTRAST    CT KNEE RIGHT WO CONTRAST    hematoma. Await CT ?? fracture.        Plan:      Orders Placed This Encounter   Procedures    CT KNEE LEFT WO CONTRAST     Standing Status:   Future     Number of Occurrences:   1     Expected Date:   7/15/2025     Expiration Date:   7/15/2026    CT KNEE RIGHT WO CONTRAST     Standing Status:   Future     Number of Occurrences:   1     Expected Date:   7/15/2025     Expiration Date:   7/15/2026     No follow-ups on file.

## 2025-07-15 NOTE — TELEPHONE ENCOUNTER
She also needs to make an appointment for follow up with neurologist Dr. Dickinson, regarding the recurrent falls.   Definitely continue home PT.

## 2025-07-21 DIAGNOSIS — E11.40 TYPE 2 DIABETES MELLITUS WITH DIABETIC NEUROPATHY, WITHOUT LONG-TERM CURRENT USE OF INSULIN (HCC): ICD-10-CM

## 2025-07-21 NOTE — TELEPHONE ENCOUNTER
Why did you change metformin to be taken in am?  Carrie was wondering.  Since you changed that, her glucose is now running between 130-150, used to be around 120 before the metformin change.

## 2025-07-22 DIAGNOSIS — E11.40 TYPE 2 DIABETES MELLITUS WITH DIABETIC NEUROPATHY, WITHOUT LONG-TERM CURRENT USE OF INSULIN (HCC): ICD-10-CM

## 2025-07-22 RX ORDER — PIOGLITAZONE 15 MG/1
15 TABLET ORAL DAILY
Qty: 90 TABLET | Refills: 3 | Status: SHIPPED | OUTPATIENT
Start: 2025-07-22

## 2025-07-23 RX ORDER — POLYETHYLENE GLYCOL 3350, SODIUM CHLORIDE, SODIUM BICARBONATE, POTASSIUM CHLORIDE 420; 11.2; 5.72; 1.48 G/4L; G/4L; G/4L; G/4L
4000 POWDER, FOR SOLUTION ORAL ONCE
Qty: 4000 ML | Refills: 0 | Status: SHIPPED | OUTPATIENT
Start: 2025-07-23 | End: 2025-07-23

## 2025-07-31 DIAGNOSIS — E11.9 TYPE 2 DIABETES MELLITUS WITHOUT COMPLICATION, WITHOUT LONG-TERM CURRENT USE OF INSULIN (HCC): ICD-10-CM

## 2025-07-31 RX ORDER — LANCETS 30 GAUGE
EACH MISCELLANEOUS
Qty: 200 EACH | Refills: 10 | Status: SHIPPED | OUTPATIENT
Start: 2025-07-31

## 2025-08-04 ENCOUNTER — OFFICE VISIT (OUTPATIENT)
Age: 70
End: 2025-08-04
Payer: MEDICARE

## 2025-08-04 VITALS
WEIGHT: 293 LBS | SYSTOLIC BLOOD PRESSURE: 112 MMHG | RESPIRATION RATE: 18 BRPM | HEART RATE: 96 BPM | BODY MASS INDEX: 48.82 KG/M2 | TEMPERATURE: 97.6 F | OXYGEN SATURATION: 95 % | HEIGHT: 65 IN | DIASTOLIC BLOOD PRESSURE: 66 MMHG

## 2025-08-04 DIAGNOSIS — R05.3 CHRONIC COUGH: ICD-10-CM

## 2025-08-04 DIAGNOSIS — G47.34 NOCTURNAL HYPOXIA: ICD-10-CM

## 2025-08-04 DIAGNOSIS — Z87.891 PERSONAL HISTORY OF TOBACCO USE: ICD-10-CM

## 2025-08-04 DIAGNOSIS — G47.33 OSA (OBSTRUCTIVE SLEEP APNEA): ICD-10-CM

## 2025-08-04 DIAGNOSIS — J44.9 CHRONIC OBSTRUCTIVE PULMONARY DISEASE, UNSPECIFIED COPD TYPE (HCC): ICD-10-CM

## 2025-08-04 DIAGNOSIS — I51.89 DIASTOLIC DYSFUNCTION: ICD-10-CM

## 2025-08-04 DIAGNOSIS — J44.89 ASTHMA-COPD OVERLAP SYNDROME (HCC): Primary | ICD-10-CM

## 2025-08-04 DIAGNOSIS — E66.813 CLASS 3 SEVERE OBESITY DUE TO EXCESS CALORIES WITH SERIOUS COMORBIDITY AND BODY MASS INDEX (BMI) OF 50.0 TO 59.9 IN ADULT (HCC): ICD-10-CM

## 2025-08-04 DIAGNOSIS — E11.69 TYPE 2 DIABETES MELLITUS WITH OTHER SPECIFIED COMPLICATION, UNSPECIFIED WHETHER LONG TERM INSULIN USE (HCC): ICD-10-CM

## 2025-08-04 PROCEDURE — 1159F MED LIST DOCD IN RCRD: CPT | Performed by: INTERNAL MEDICINE

## 2025-08-04 PROCEDURE — 1123F ACP DISCUSS/DSCN MKR DOCD: CPT | Performed by: INTERNAL MEDICINE

## 2025-08-04 RX ORDER — DUPILUMAB 300 MG/2ML
300 INJECTION, SOLUTION SUBCUTANEOUS
Qty: 4 ML | Refills: 5 | Status: SHIPPED | OUTPATIENT
Start: 2025-08-04

## 2025-08-04 RX ORDER — TIRZEPATIDE 2.5 MG/.5ML
0.5 INJECTION, SOLUTION SUBCUTANEOUS WEEKLY
Qty: 10 ML | Refills: 2 | Status: SHIPPED | OUTPATIENT
Start: 2025-08-04 | End: 2025-09-01

## 2025-08-07 ENCOUNTER — TELEPHONE (OUTPATIENT)
Age: 70
End: 2025-08-07

## 2025-08-07 ENCOUNTER — HOSPITAL ENCOUNTER (OUTPATIENT)
Age: 70
Setting detail: OUTPATIENT SURGERY
Discharge: HOME | End: 2025-08-07
Attending: SPECIALIST | Admitting: SPECIALIST
Payer: MEDICARE

## 2025-08-07 ENCOUNTER — ANESTHESIA EVENT (OUTPATIENT)
Dept: ENDOSCOPY | Age: 70
End: 2025-08-07
Payer: MEDICARE

## 2025-08-07 ENCOUNTER — ANESTHESIA (OUTPATIENT)
Dept: ENDOSCOPY | Age: 70
End: 2025-08-07
Payer: MEDICARE

## 2025-08-07 VITALS
TEMPERATURE: 97.9 F | DIASTOLIC BLOOD PRESSURE: 55 MMHG | HEIGHT: 66 IN | BODY MASS INDEX: 47.09 KG/M2 | RESPIRATION RATE: 18 BRPM | WEIGHT: 293 LBS | SYSTOLIC BLOOD PRESSURE: 103 MMHG | OXYGEN SATURATION: 96 % | HEART RATE: 89 BPM

## 2025-08-07 DIAGNOSIS — Z80.0 FAMILY HISTORY OF COLON CANCER IN FATHER: ICD-10-CM

## 2025-08-07 DIAGNOSIS — Z86.0100 HISTORY OF COLON POLYPS: ICD-10-CM

## 2025-08-07 PROCEDURE — 88305 TISSUE EXAM BY PATHOLOGIST: CPT

## 2025-08-07 PROCEDURE — 7100000010 HC PHASE II RECOVERY - FIRST 15 MIN: Performed by: SPECIALIST

## 2025-08-07 PROCEDURE — 3609027000 HC COLONOSCOPY: Performed by: SPECIALIST

## 2025-08-07 PROCEDURE — 7100000011 HC PHASE II RECOVERY - ADDTL 15 MIN: Performed by: SPECIALIST

## 2025-08-07 PROCEDURE — 45380 COLONOSCOPY AND BIOPSY: CPT | Performed by: SPECIALIST

## 2025-08-07 PROCEDURE — 3700000000 HC ANESTHESIA ATTENDED CARE: Performed by: SPECIALIST

## 2025-08-07 PROCEDURE — 2709999900 HC NON-CHARGEABLE SUPPLY: Performed by: SPECIALIST

## 2025-08-07 PROCEDURE — 2500000003 HC RX 250 WO HCPCS: Performed by: SPECIALIST

## 2025-08-07 PROCEDURE — 2580000003 HC RX 258: Performed by: SPECIALIST

## 2025-08-07 PROCEDURE — 45385 COLONOSCOPY W/LESION REMOVAL: CPT | Performed by: SPECIALIST

## 2025-08-07 PROCEDURE — 3700000001 HC ADD 15 MINUTES (ANESTHESIA): Performed by: SPECIALIST

## 2025-08-07 PROCEDURE — 6360000002 HC RX W HCPCS: Performed by: NURSE ANESTHETIST, CERTIFIED REGISTERED

## 2025-08-07 RX ORDER — SODIUM CHLORIDE 9 MG/ML
INJECTION, SOLUTION INTRAVENOUS CONTINUOUS
Status: DISCONTINUED | OUTPATIENT
Start: 2025-08-07 | End: 2025-08-07 | Stop reason: HOSPADM

## 2025-08-07 RX ORDER — SODIUM CHLORIDE 0.9 % (FLUSH) 0.9 %
5-40 SYRINGE (ML) INJECTION EVERY 12 HOURS SCHEDULED
Status: DISCONTINUED | OUTPATIENT
Start: 2025-08-07 | End: 2025-08-07 | Stop reason: HOSPADM

## 2025-08-07 RX ORDER — SODIUM CHLORIDE 0.9 % (FLUSH) 0.9 %
5-40 SYRINGE (ML) INJECTION PRN
Status: DISCONTINUED | OUTPATIENT
Start: 2025-08-07 | End: 2025-08-07 | Stop reason: HOSPADM

## 2025-08-07 RX ORDER — PROPOFOL 10 MG/ML
INJECTION, EMULSION INTRAVENOUS
Status: DISCONTINUED | OUTPATIENT
Start: 2025-08-07 | End: 2025-08-07 | Stop reason: SDUPTHER

## 2025-08-07 RX ORDER — LIDOCAINE HYDROCHLORIDE 20 MG/ML
INJECTION, SOLUTION INFILTRATION; PERINEURAL
Status: DISCONTINUED | OUTPATIENT
Start: 2025-08-07 | End: 2025-08-07 | Stop reason: SDUPTHER

## 2025-08-07 RX ORDER — SODIUM CHLORIDE 9 MG/ML
INJECTION, SOLUTION INTRAVENOUS PRN
Status: DISCONTINUED | OUTPATIENT
Start: 2025-08-07 | End: 2025-08-07 | Stop reason: HOSPADM

## 2025-08-07 RX ADMIN — PROPOFOL 50 MG: 10 INJECTION, EMULSION INTRAVENOUS at 10:57

## 2025-08-07 RX ADMIN — PROPOFOL 50 MG: 10 INJECTION, EMULSION INTRAVENOUS at 10:50

## 2025-08-07 RX ADMIN — LIDOCAINE HYDROCHLORIDE 50 MG: 20 INJECTION, SOLUTION INFILTRATION; PERINEURAL at 10:43

## 2025-08-07 RX ADMIN — PROPOFOL 50 MG: 10 INJECTION, EMULSION INTRAVENOUS at 11:01

## 2025-08-07 RX ADMIN — PROPOFOL 50 MG: 10 INJECTION, EMULSION INTRAVENOUS at 11:05

## 2025-08-07 RX ADMIN — PROPOFOL 150 MG: 10 INJECTION, EMULSION INTRAVENOUS at 10:43

## 2025-08-07 RX ADMIN — SODIUM CHLORIDE: 0.9 INJECTION, SOLUTION INTRAVENOUS at 10:37

## 2025-08-07 RX ADMIN — PROPOFOL 50 MG: 10 INJECTION, EMULSION INTRAVENOUS at 10:47

## 2025-08-07 RX ADMIN — PROPOFOL 50 MG: 10 INJECTION, EMULSION INTRAVENOUS at 10:53

## 2025-08-07 RX ADMIN — PROPOFOL 50 MG: 10 INJECTION, EMULSION INTRAVENOUS at 11:09

## 2025-08-07 ASSESSMENT — PAIN - FUNCTIONAL ASSESSMENT: PAIN_FUNCTIONAL_ASSESSMENT: 0-10

## 2025-08-07 ASSESSMENT — ENCOUNTER SYMPTOMS: SHORTNESS OF BREATH: 1

## 2025-08-13 ENCOUNTER — TELEPHONE (OUTPATIENT)
Age: 70
End: 2025-08-13

## 2025-08-14 ENCOUNTER — OFFICE VISIT (OUTPATIENT)
Dept: PRIMARY CARE CLINIC | Age: 70
End: 2025-08-14

## 2025-08-14 VITALS
HEIGHT: 65 IN | OXYGEN SATURATION: 98 % | TEMPERATURE: 98 F | DIASTOLIC BLOOD PRESSURE: 80 MMHG | BODY MASS INDEX: 48.82 KG/M2 | WEIGHT: 293 LBS | SYSTOLIC BLOOD PRESSURE: 124 MMHG | HEART RATE: 90 BPM

## 2025-08-14 DIAGNOSIS — R60.0 PEDAL EDEMA: ICD-10-CM

## 2025-08-14 DIAGNOSIS — R09.82 POST-NASAL DRIP: ICD-10-CM

## 2025-08-14 DIAGNOSIS — R05.2 SUBACUTE COUGH: ICD-10-CM

## 2025-08-14 DIAGNOSIS — R22.41 LOCALIZED SWELLING, MASS, OR LUMP OF RIGHT LOWER EXTREMITY: ICD-10-CM

## 2025-08-14 DIAGNOSIS — L03.115 CELLULITIS OF RIGHT LEG: Primary | ICD-10-CM

## 2025-08-14 RX ORDER — CEPHALEXIN 500 MG/1
500 CAPSULE ORAL 4 TIMES DAILY
Qty: 56 CAPSULE | Refills: 0 | Status: SHIPPED | OUTPATIENT
Start: 2025-08-14 | End: 2025-08-28

## 2025-08-14 RX ORDER — TORSEMIDE 20 MG/1
20 TABLET ORAL DAILY PRN
Qty: 30 TABLET | Refills: 1 | Status: SHIPPED | OUTPATIENT
Start: 2025-08-14

## 2025-08-18 ENCOUNTER — HOSPITAL ENCOUNTER (OUTPATIENT)
Dept: ULTRASOUND IMAGING | Age: 70
Discharge: HOME | End: 2025-08-20
Payer: MEDICARE

## 2025-08-18 DIAGNOSIS — L03.115 CELLULITIS OF RIGHT LEG: ICD-10-CM

## 2025-08-18 DIAGNOSIS — R22.41 LOCALIZED SWELLING, MASS, OR LUMP OF RIGHT LOWER EXTREMITY: ICD-10-CM

## 2025-08-18 PROCEDURE — 76999 ECHO EXAMINATION PROCEDURE: CPT

## (undated) DEVICE — TRAP POLYP BALEEN

## (undated) DEVICE — BRUSH ENDO CLN L90.5IN SHTH DIA1.7MM BRIST DIA5-7MM 2-6MM

## (undated) DEVICE — TUBING IRRIGATION 140/160/180/190 SER GI ENDOSCP SMARTCAP

## (undated) DEVICE — TUBING SCTION CONN 1/4X10 RIB

## (undated) DEVICE — Device: Brand: ENDO SMARTCAP

## (undated) DEVICE — ENDO CARRY-ON PROCEDURE KIT: Brand: ENDO CARRY-ON PROCEDURE KIT

## (undated) DEVICE — SINGLE PORT MANIFOLD: Brand: NEPTUNE 2

## (undated) DEVICE — ADAPTER FLSH PMP FLD MGMT GI IRRIG OFP 2 DISPOSABLE

## (undated) DEVICE — FORCEPS BX L240CM JAW DIA2.4MM ORNG L CAP W/ NDL DISP RAD

## (undated) DEVICE — FORCEPS ENDOSCP BX OVL CUP SERR W/NEEDLE 2.3MM DIA 160CML

## (undated) DEVICE — SURGICAL PROCEDURE KIT ENDOSCP CUST 883 CARRY-ON

## (undated) DEVICE — SUPPLEMENT DIGESTIVE H2O SOL GI-EASE

## (undated) DEVICE — TUBE SET 96 MM 64 MM H2O PERISTALTIC STD AUX CHANNEL

## (undated) DEVICE — MANIFOLD SUCT SMK EVAC SGL PRT DISP NEPTUNE 2

## (undated) DEVICE — SNARE ENDOSCP AD L240CM LOOP W10MM SHTH DIA2.4MM RND INSUL

## (undated) DEVICE — TUBING, SUCTION, 1/4" X 10', STRAIGHT: Brand: MEDLINE

## (undated) DEVICE — SNARE VASC L240CM LOOP W10MM SHTH DIA2.4MM RND STIFF CLD